# Patient Record
Sex: FEMALE | Race: ASIAN | NOT HISPANIC OR LATINO | Employment: UNEMPLOYED | URBAN - METROPOLITAN AREA
[De-identification: names, ages, dates, MRNs, and addresses within clinical notes are randomized per-mention and may not be internally consistent; named-entity substitution may affect disease eponyms.]

---

## 2017-01-23 ENCOUNTER — OFFICE VISIT - HEALTHEAST (OUTPATIENT)
Dept: FAMILY MEDICINE | Facility: CLINIC | Age: 27
End: 2017-01-23

## 2017-01-23 DIAGNOSIS — D50.9 IRON DEFICIENCY ANEMIA: ICD-10-CM

## 2017-01-23 DIAGNOSIS — F41.9 ANXIETY: ICD-10-CM

## 2017-01-27 ENCOUNTER — COMMUNICATION - HEALTHEAST (OUTPATIENT)
Dept: ONCOLOGY | Facility: HOSPITAL | Age: 27
End: 2017-01-27

## 2017-04-18 ENCOUNTER — OFFICE VISIT - HEALTHEAST (OUTPATIENT)
Dept: ONCOLOGY | Facility: HOSPITAL | Age: 27
End: 2017-04-18

## 2017-04-18 ENCOUNTER — AMBULATORY - HEALTHEAST (OUTPATIENT)
Dept: INFUSION THERAPY | Facility: HOSPITAL | Age: 27
End: 2017-04-18

## 2017-04-18 DIAGNOSIS — D50.0 IRON DEFICIENCY ANEMIA DUE TO CHRONIC BLOOD LOSS: ICD-10-CM

## 2017-04-20 ENCOUNTER — INFUSION - HEALTHEAST (OUTPATIENT)
Dept: INFUSION THERAPY | Facility: HOSPITAL | Age: 27
End: 2017-04-20

## 2017-04-20 DIAGNOSIS — D50.0 IRON DEFICIENCY ANEMIA DUE TO CHRONIC BLOOD LOSS: ICD-10-CM

## 2017-04-21 LAB
BLD PROD TYP BPU: NORMAL
BLD PROD TYP BPU: NORMAL
BLOOD EXPIRATION DATE: NORMAL
BLOOD EXPIRATION DATE: NORMAL
BLOOD TYPE: 5100
BLOOD TYPE: 5100
CODING SYSTEM: NORMAL
CODING SYSTEM: NORMAL
COMPONENT (HISTORICAL CONVERSION): NORMAL
COMPONENT (HISTORICAL CONVERSION): NORMAL
CROSSMATCH: NORMAL
CROSSMATCH: NORMAL
ISSUE DATE AND TIME: NORMAL
ISSUE DATE AND TIME: NORMAL
STATUS (HISTORICAL CONVERSION): NORMAL
STATUS (HISTORICAL CONVERSION): NORMAL
UNIT ABO/RH (HISTORICAL CONVERSION): NORMAL
UNIT ABO/RH (HISTORICAL CONVERSION): NORMAL
UNIT NUMBER: NORMAL
UNIT NUMBER: NORMAL

## 2017-04-24 ENCOUNTER — INFUSION - HEALTHEAST (OUTPATIENT)
Dept: INFUSION THERAPY | Facility: HOSPITAL | Age: 27
End: 2017-04-24

## 2017-04-24 DIAGNOSIS — D50.0 IRON DEFICIENCY ANEMIA DUE TO CHRONIC BLOOD LOSS: ICD-10-CM

## 2017-04-26 ENCOUNTER — INFUSION - HEALTHEAST (OUTPATIENT)
Dept: INFUSION THERAPY | Facility: HOSPITAL | Age: 27
End: 2017-04-26

## 2017-04-26 DIAGNOSIS — D50.0 IRON DEFICIENCY ANEMIA DUE TO CHRONIC BLOOD LOSS: ICD-10-CM

## 2017-04-27 ENCOUNTER — OFFICE VISIT - HEALTHEAST (OUTPATIENT)
Dept: FAMILY MEDICINE | Facility: CLINIC | Age: 27
End: 2017-04-27

## 2017-04-27 DIAGNOSIS — Z30.9 CONTRACEPTIVE MANAGEMENT: ICD-10-CM

## 2017-04-27 DIAGNOSIS — F41.9 ANXIETY: ICD-10-CM

## 2017-04-27 DIAGNOSIS — Z23 IMMUNIZATION DUE: ICD-10-CM

## 2017-04-27 DIAGNOSIS — F41.1 ANXIETY STATE: ICD-10-CM

## 2017-04-27 ASSESSMENT — MIFFLIN-ST. JEOR: SCORE: 1615.4

## 2017-04-28 ENCOUNTER — INFUSION - HEALTHEAST (OUTPATIENT)
Dept: INFUSION THERAPY | Facility: HOSPITAL | Age: 27
End: 2017-04-28

## 2017-04-28 DIAGNOSIS — D50.0 IRON DEFICIENCY ANEMIA DUE TO CHRONIC BLOOD LOSS: ICD-10-CM

## 2017-05-01 ENCOUNTER — INFUSION - HEALTHEAST (OUTPATIENT)
Dept: INFUSION THERAPY | Facility: HOSPITAL | Age: 27
End: 2017-05-01

## 2017-05-01 DIAGNOSIS — D50.0 IRON DEFICIENCY ANEMIA DUE TO CHRONIC BLOOD LOSS: ICD-10-CM

## 2017-05-31 ENCOUNTER — COMMUNICATION - HEALTHEAST (OUTPATIENT)
Dept: ADMINISTRATIVE | Facility: HOSPITAL | Age: 27
End: 2017-05-31

## 2017-06-08 ENCOUNTER — COMMUNICATION - HEALTHEAST (OUTPATIENT)
Dept: ONCOLOGY | Facility: HOSPITAL | Age: 27
End: 2017-06-08

## 2017-06-12 ENCOUNTER — OFFICE VISIT - HEALTHEAST (OUTPATIENT)
Dept: ONCOLOGY | Facility: HOSPITAL | Age: 27
End: 2017-06-12

## 2017-06-12 ENCOUNTER — AMBULATORY - HEALTHEAST (OUTPATIENT)
Dept: INFUSION THERAPY | Facility: HOSPITAL | Age: 27
End: 2017-06-12

## 2017-06-12 DIAGNOSIS — D50.0 IRON DEFICIENCY ANEMIA DUE TO CHRONIC BLOOD LOSS: ICD-10-CM

## 2017-08-01 ENCOUNTER — COMMUNICATION - HEALTHEAST (OUTPATIENT)
Dept: ONCOLOGY | Facility: HOSPITAL | Age: 27
End: 2017-08-01

## 2017-08-01 ENCOUNTER — AMBULATORY - HEALTHEAST (OUTPATIENT)
Dept: ONCOLOGY | Facility: HOSPITAL | Age: 27
End: 2017-08-01

## 2017-08-01 ENCOUNTER — AMBULATORY - HEALTHEAST (OUTPATIENT)
Dept: INFUSION THERAPY | Facility: HOSPITAL | Age: 27
End: 2017-08-01

## 2017-08-01 DIAGNOSIS — D50.9 IRON DEFICIENCY ANEMIA: ICD-10-CM

## 2017-08-02 ENCOUNTER — INFUSION - HEALTHEAST (OUTPATIENT)
Dept: INFUSION THERAPY | Facility: HOSPITAL | Age: 27
End: 2017-08-02

## 2017-08-02 DIAGNOSIS — D50.9 IRON DEFICIENCY ANEMIA: ICD-10-CM

## 2017-08-24 ENCOUNTER — COMMUNICATION - HEALTHEAST (OUTPATIENT)
Dept: FAMILY MEDICINE | Facility: CLINIC | Age: 27
End: 2017-08-24

## 2017-08-24 DIAGNOSIS — F41.9 ANXIETY: ICD-10-CM

## 2017-09-26 ENCOUNTER — COMMUNICATION - HEALTHEAST (OUTPATIENT)
Dept: ADMINISTRATIVE | Facility: HOSPITAL | Age: 27
End: 2017-09-26

## 2017-10-03 ENCOUNTER — AMBULATORY - HEALTHEAST (OUTPATIENT)
Dept: INFUSION THERAPY | Facility: HOSPITAL | Age: 27
End: 2017-10-03

## 2017-10-03 DIAGNOSIS — D50.0 IRON DEFICIENCY ANEMIA DUE TO CHRONIC BLOOD LOSS: ICD-10-CM

## 2017-10-26 ENCOUNTER — OFFICE VISIT - HEALTHEAST (OUTPATIENT)
Dept: ONCOLOGY | Facility: HOSPITAL | Age: 27
End: 2017-10-26

## 2017-10-26 DIAGNOSIS — D50.0 IRON DEFICIENCY ANEMIA DUE TO CHRONIC BLOOD LOSS: ICD-10-CM

## 2017-10-30 ENCOUNTER — INFUSION - HEALTHEAST (OUTPATIENT)
Dept: INFUSION THERAPY | Facility: HOSPITAL | Age: 27
End: 2017-10-30

## 2017-10-30 DIAGNOSIS — D50.0 IRON DEFICIENCY ANEMIA DUE TO CHRONIC BLOOD LOSS: ICD-10-CM

## 2017-11-01 ENCOUNTER — INFUSION - HEALTHEAST (OUTPATIENT)
Dept: INFUSION THERAPY | Facility: HOSPITAL | Age: 27
End: 2017-11-01

## 2017-11-01 ENCOUNTER — AMBULATORY - HEALTHEAST (OUTPATIENT)
Dept: ONCOLOGY | Facility: HOSPITAL | Age: 27
End: 2017-11-01

## 2017-11-01 DIAGNOSIS — D50.0 IRON DEFICIENCY ANEMIA DUE TO CHRONIC BLOOD LOSS: ICD-10-CM

## 2017-11-01 DIAGNOSIS — R11.0 NAUSEA: ICD-10-CM

## 2017-11-03 ENCOUNTER — INFUSION - HEALTHEAST (OUTPATIENT)
Dept: INFUSION THERAPY | Facility: HOSPITAL | Age: 27
End: 2017-11-03

## 2017-11-03 DIAGNOSIS — R11.0 NAUSEA: ICD-10-CM

## 2017-11-03 DIAGNOSIS — D50.0 IRON DEFICIENCY ANEMIA DUE TO CHRONIC BLOOD LOSS: ICD-10-CM

## 2017-11-06 ENCOUNTER — INFUSION - HEALTHEAST (OUTPATIENT)
Dept: INFUSION THERAPY | Facility: HOSPITAL | Age: 27
End: 2017-11-06

## 2017-11-06 DIAGNOSIS — D50.0 IRON DEFICIENCY ANEMIA DUE TO CHRONIC BLOOD LOSS: ICD-10-CM

## 2017-11-06 DIAGNOSIS — R11.0 NAUSEA: ICD-10-CM

## 2017-11-08 ENCOUNTER — INFUSION - HEALTHEAST (OUTPATIENT)
Dept: INFUSION THERAPY | Facility: HOSPITAL | Age: 27
End: 2017-11-08

## 2017-11-08 DIAGNOSIS — R11.0 NAUSEA: ICD-10-CM

## 2017-11-08 DIAGNOSIS — D50.0 IRON DEFICIENCY ANEMIA DUE TO CHRONIC BLOOD LOSS: ICD-10-CM

## 2017-11-10 ENCOUNTER — INFUSION - HEALTHEAST (OUTPATIENT)
Dept: INFUSION THERAPY | Facility: HOSPITAL | Age: 27
End: 2017-11-10

## 2017-11-10 DIAGNOSIS — D50.0 IRON DEFICIENCY ANEMIA DUE TO CHRONIC BLOOD LOSS: ICD-10-CM

## 2017-12-26 ENCOUNTER — COMMUNICATION - HEALTHEAST (OUTPATIENT)
Dept: ADMINISTRATIVE | Facility: HOSPITAL | Age: 27
End: 2017-12-26

## 2018-02-01 ENCOUNTER — OFFICE VISIT - HEALTHEAST (OUTPATIENT)
Dept: FAMILY MEDICINE | Facility: CLINIC | Age: 28
End: 2018-02-01

## 2018-02-01 DIAGNOSIS — D50.0 IRON DEFICIENCY ANEMIA DUE TO CHRONIC BLOOD LOSS: ICD-10-CM

## 2018-02-01 DIAGNOSIS — K64.9 HEMORRHOIDS: ICD-10-CM

## 2018-02-01 DIAGNOSIS — K62.5 RECTAL BLEEDING: ICD-10-CM

## 2018-02-01 LAB
BASOPHILS # BLD AUTO: 0 THOU/UL (ref 0–0.2)
BASOPHILS NFR BLD AUTO: 0 % (ref 0–2)
EOSINOPHIL COUNT (ABSOLUTE): 0.2 THOU/UL (ref 0–0.4)
EOSINOPHIL NFR BLD AUTO: 3 % (ref 0–6)
ERYTHROCYTE [DISTWIDTH] IN BLOOD BY AUTOMATED COUNT: 15 % (ref 11–14.5)
FERRITIN SERPL-MCNC: 11 NG/ML (ref 10–130)
HCT VFR BLD AUTO: 32.9 % (ref 35–47)
HGB BLD-MCNC: 10.8 G/DL (ref 12–16)
LYMPHOCYTES # BLD AUTO: 2.4 THOU/UL (ref 0.8–4.4)
LYMPHOCYTES NFR BLD AUTO: 34 % (ref 20–40)
MCH RBC QN AUTO: 28.2 PG (ref 27–34)
MCHC RBC AUTO-ENTMCNC: 32.8 G/DL (ref 32–36)
MCV RBC AUTO: 86 FL (ref 80–100)
MONOCYTES # BLD AUTO: 0.5 THOU/UL (ref 0–0.9)
MONOCYTES NFR BLD AUTO: 7 % (ref 2–10)
OVALOCYTES: ABNORMAL
PLAT MORPH BLD: NORMAL
PLATELET # BLD AUTO: 402 THOU/UL (ref 140–440)
PMV BLD AUTO: 8.9 FL (ref 8.5–12.5)
RBC # BLD AUTO: 3.83 MILL/UL (ref 3.8–5.4)
REACTIVE LYMPHS: ABNORMAL
TOTAL NEUTROPHILS-ABS(DIFF): 4 THOU/UL (ref 2–7.7)
TOTAL NEUTROPHILS-REL(DIFF): 56 % (ref 50–70)
WBC: 7.1 THOU/UL (ref 4–11)

## 2018-02-01 ASSESSMENT — MIFFLIN-ST. JEOR: SCORE: 1585.91

## 2018-02-20 ENCOUNTER — COMMUNICATION - HEALTHEAST (OUTPATIENT)
Dept: ADMINISTRATIVE | Facility: HOSPITAL | Age: 28
End: 2018-02-20

## 2018-04-17 ENCOUNTER — COMMUNICATION - HEALTHEAST (OUTPATIENT)
Dept: FAMILY MEDICINE | Facility: CLINIC | Age: 28
End: 2018-04-17

## 2018-05-22 ENCOUNTER — COMMUNICATION - HEALTHEAST (OUTPATIENT)
Dept: ONCOLOGY | Facility: HOSPITAL | Age: 28
End: 2018-05-22

## 2018-05-22 DIAGNOSIS — D50.9 IRON DEFICIENCY ANEMIA: ICD-10-CM

## 2018-05-22 DIAGNOSIS — R53.83 FATIGUE: ICD-10-CM

## 2018-05-25 ENCOUNTER — OFFICE VISIT - HEALTHEAST (OUTPATIENT)
Dept: ONCOLOGY | Facility: HOSPITAL | Age: 28
End: 2018-05-25

## 2018-05-25 DIAGNOSIS — D50.0 IRON DEFICIENCY ANEMIA DUE TO CHRONIC BLOOD LOSS: ICD-10-CM

## 2018-05-29 ENCOUNTER — OFFICE VISIT - HEALTHEAST (OUTPATIENT)
Dept: MIDWIFE SERVICES | Facility: CLINIC | Age: 28
End: 2018-05-29

## 2018-05-29 ENCOUNTER — OFFICE VISIT - HEALTHEAST (OUTPATIENT)
Dept: FAMILY MEDICINE | Facility: CLINIC | Age: 28
End: 2018-05-29

## 2018-05-29 DIAGNOSIS — D50.9 IRON DEFICIENCY ANEMIA: ICD-10-CM

## 2018-05-29 DIAGNOSIS — N92.1 MENORRHAGIA WITH IRREGULAR CYCLE: ICD-10-CM

## 2018-05-29 DIAGNOSIS — Z01.812 PRE-PROCEDURE LAB EXAM: ICD-10-CM

## 2018-05-29 DIAGNOSIS — D64.9 ANEMIA: ICD-10-CM

## 2018-05-29 DIAGNOSIS — E66.9 OBESITY (BMI 30-39.9): ICD-10-CM

## 2018-05-29 DIAGNOSIS — E28.2 PCOS (POLYCYSTIC OVARIAN SYNDROME): ICD-10-CM

## 2018-05-29 DIAGNOSIS — Z09 FOLLOW UP: ICD-10-CM

## 2018-05-29 DIAGNOSIS — N92.0 MENORRHAGIA: ICD-10-CM

## 2018-05-29 DIAGNOSIS — Z30.430 ENCOUNTER FOR INSERTION OF INTRAUTERINE CONTRACEPTIVE DEVICE (IUD): ICD-10-CM

## 2018-05-29 DIAGNOSIS — R53.83 FATIGUE: ICD-10-CM

## 2018-05-29 DIAGNOSIS — Z12.4 SCREENING FOR CERVICAL CANCER: ICD-10-CM

## 2018-05-29 LAB
ERYTHROCYTE [DISTWIDTH] IN BLOOD BY AUTOMATED COUNT: 27 % (ref 11–14.5)
FERRITIN SERPL-MCNC: 54 NG/ML (ref 10–130)
HCG UR QL: NEGATIVE
HCT VFR BLD AUTO: 29.2 % (ref 35–47)
HGB BLD-MCNC: 8.4 G/DL (ref 12–16)
IRON SATN MFR SERPL: 6 % (ref 20–50)
IRON SERPL-MCNC: 25 UG/DL (ref 42–175)
MCH RBC QN AUTO: 20.9 PG (ref 27–34)
MCHC RBC AUTO-ENTMCNC: 28.8 G/DL (ref 32–36)
MCV RBC AUTO: 73 FL (ref 80–100)
PLATELET # BLD AUTO: 434 THOU/UL (ref 140–440)
PMV BLD AUTO: 9.4 FL (ref 8.5–12.5)
RBC # BLD AUTO: 4.01 MILL/UL (ref 3.8–5.4)
SP GR UR STRIP: 1.02 (ref 1–1.03)
TIBC SERPL-MCNC: 448 UG/DL (ref 313–563)
TRANSFERRIN SERPL-MCNC: 358 MG/DL (ref 212–360)
VIT B12 SERPL-MCNC: 536 PG/ML (ref 213–816)
WBC: 6.5 THOU/UL (ref 4–11)

## 2018-05-29 ASSESSMENT — MIFFLIN-ST. JEOR: SCORE: 1563.23

## 2018-05-30 LAB
25(OH)D3 SERPL-MCNC: 16.5 NG/ML (ref 30–80)
25(OH)D3 SERPL-MCNC: 16.5 NG/ML (ref 30–80)
BKR LAB AP ABNORMAL BLEEDING: YES
BKR LAB AP BIRTH CONTROL/HORMONES: NORMAL
BKR LAB AP CERVICAL APPEARANCE: NORMAL
BKR LAB AP GYN ADEQUACY: NORMAL
BKR LAB AP GYN INTERPRETATION: NORMAL
BKR LAB AP HPV REFLEX: NORMAL
BKR LAB AP LMP: NORMAL
BKR LAB AP PATIENT STATUS: NORMAL
BKR LAB AP PREVIOUS ABNORMAL: NORMAL
BKR LAB AP PREVIOUS NORMAL: NORMAL
HIGH RISK?: NO
PATH REPORT.COMMENTS IMP SPEC: NORMAL
RESULT FLAG (HE HISTORICAL CONVERSION): NORMAL

## 2018-06-11 ENCOUNTER — INFUSION - HEALTHEAST (OUTPATIENT)
Dept: INFUSION THERAPY | Facility: HOSPITAL | Age: 28
End: 2018-06-11

## 2018-06-11 DIAGNOSIS — D50.0 IRON DEFICIENCY ANEMIA DUE TO CHRONIC BLOOD LOSS: ICD-10-CM

## 2018-06-11 DIAGNOSIS — R11.2 NAUSEA & VOMITING: ICD-10-CM

## 2018-06-13 ENCOUNTER — INFUSION - HEALTHEAST (OUTPATIENT)
Dept: INFUSION THERAPY | Facility: HOSPITAL | Age: 28
End: 2018-06-13

## 2018-06-13 DIAGNOSIS — N92.1 MENORRHAGIA WITH IRREGULAR CYCLE: ICD-10-CM

## 2018-06-13 DIAGNOSIS — R11.2 NAUSEA & VOMITING: ICD-10-CM

## 2018-06-13 DIAGNOSIS — D50.0 IRON DEFICIENCY ANEMIA DUE TO CHRONIC BLOOD LOSS: ICD-10-CM

## 2018-06-15 ENCOUNTER — INFUSION - HEALTHEAST (OUTPATIENT)
Dept: INFUSION THERAPY | Facility: HOSPITAL | Age: 28
End: 2018-06-15

## 2018-06-15 DIAGNOSIS — D50.0 IRON DEFICIENCY ANEMIA DUE TO CHRONIC BLOOD LOSS: ICD-10-CM

## 2018-06-15 DIAGNOSIS — R11.2 NAUSEA & VOMITING: ICD-10-CM

## 2018-06-18 ENCOUNTER — INFUSION - HEALTHEAST (OUTPATIENT)
Dept: INFUSION THERAPY | Facility: HOSPITAL | Age: 28
End: 2018-06-18

## 2018-06-18 DIAGNOSIS — R11.2 NAUSEA & VOMITING: ICD-10-CM

## 2018-06-18 DIAGNOSIS — D50.0 IRON DEFICIENCY ANEMIA DUE TO CHRONIC BLOOD LOSS: ICD-10-CM

## 2018-06-20 ENCOUNTER — INFUSION - HEALTHEAST (OUTPATIENT)
Dept: INFUSION THERAPY | Facility: HOSPITAL | Age: 28
End: 2018-06-20

## 2018-06-20 DIAGNOSIS — R11.2 NAUSEA & VOMITING: ICD-10-CM

## 2018-06-20 DIAGNOSIS — D50.0 IRON DEFICIENCY ANEMIA DUE TO CHRONIC BLOOD LOSS: ICD-10-CM

## 2018-06-20 DIAGNOSIS — E61.1 IRON DEFICIENCY: ICD-10-CM

## 2018-06-21 ENCOUNTER — RECORDS - HEALTHEAST (OUTPATIENT)
Dept: ADMINISTRATIVE | Facility: OTHER | Age: 28
End: 2018-06-21

## 2018-06-26 ENCOUNTER — OFFICE VISIT - HEALTHEAST (OUTPATIENT)
Dept: MIDWIFE SERVICES | Facility: CLINIC | Age: 28
End: 2018-06-26

## 2018-06-26 ENCOUNTER — AMBULATORY - HEALTHEAST (OUTPATIENT)
Dept: MIDWIFE SERVICES | Facility: CLINIC | Age: 28
End: 2018-06-26

## 2018-06-26 DIAGNOSIS — N89.8 VAGINAL ODOR: ICD-10-CM

## 2018-06-26 DIAGNOSIS — N76.0 BV (BACTERIAL VAGINOSIS): ICD-10-CM

## 2018-06-26 DIAGNOSIS — B96.89 BV (BACTERIAL VAGINOSIS): ICD-10-CM

## 2018-06-26 DIAGNOSIS — N92.1 MENORRHAGIA WITH IRREGULAR CYCLE: ICD-10-CM

## 2018-06-26 LAB
CHOLEST SERPL-MCNC: 170 MG/DL
CLUE CELLS: ABNORMAL
FASTING STATUS PATIENT QL REPORTED: YES
FASTING STATUS PATIENT QL REPORTED: YES
GLUCOSE BLD-MCNC: 89 MG/DL (ref 70–125)
HDLC SERPL-MCNC: 47 MG/DL
LDLC SERPL CALC-MCNC: 97 MG/DL
PROLACTIN SERPL-MCNC: 12.5 NG/ML (ref 0–20)
TRICHOMONAS, WET PREP: ABNORMAL
TRIGL SERPL-MCNC: 132 MG/DL
YEAST, WET PREP: ABNORMAL

## 2018-06-26 ASSESSMENT — MIFFLIN-ST. JEOR: SCORE: 1576.84

## 2018-06-27 ENCOUNTER — COMMUNICATION - HEALTHEAST (OUTPATIENT)
Dept: ADMINISTRATIVE | Facility: CLINIC | Age: 28
End: 2018-06-27

## 2018-06-27 ENCOUNTER — COMMUNICATION - HEALTHEAST (OUTPATIENT)
Dept: MIDWIFE SERVICES | Facility: CLINIC | Age: 28
End: 2018-06-27

## 2018-06-27 DIAGNOSIS — B96.89 BV (BACTERIAL VAGINOSIS): ICD-10-CM

## 2018-06-27 DIAGNOSIS — N76.0 BV (BACTERIAL VAGINOSIS): ICD-10-CM

## 2018-06-27 LAB
C TRACH DNA SPEC QL PROBE+SIG AMP: NEGATIVE
N GONORRHOEA DNA SPEC QL NAA+PROBE: NEGATIVE

## 2018-06-29 LAB
SHBG SERPL-SCNC: 11 NMOL/L (ref 30–135)
TESTOST FREE SERPL-MCNC: 1.59 NG/DL (ref 0.08–0.74)
TESTOST SERPL-MCNC: 54 NG/DL (ref 8–60)

## 2018-07-16 ENCOUNTER — RECORDS - HEALTHEAST (OUTPATIENT)
Dept: ADMINISTRATIVE | Facility: OTHER | Age: 28
End: 2018-07-16

## 2018-07-30 ENCOUNTER — AMBULATORY - HEALTHEAST (OUTPATIENT)
Dept: INFUSION THERAPY | Facility: HOSPITAL | Age: 28
End: 2018-07-30

## 2018-07-30 ENCOUNTER — OFFICE VISIT - HEALTHEAST (OUTPATIENT)
Dept: ONCOLOGY | Facility: HOSPITAL | Age: 28
End: 2018-07-30

## 2018-07-30 DIAGNOSIS — D50.0 IRON DEFICIENCY ANEMIA DUE TO CHRONIC BLOOD LOSS: ICD-10-CM

## 2018-07-30 LAB
BASOPHILS # BLD AUTO: 0 THOU/UL (ref 0–0.2)
BASOPHILS NFR BLD AUTO: 0 % (ref 0–2)
EOSINOPHIL # BLD AUTO: 0.2 THOU/UL (ref 0–0.4)
EOSINOPHIL NFR BLD AUTO: 3 % (ref 0–6)
ERYTHROCYTE [DISTWIDTH] IN BLOOD BY AUTOMATED COUNT: 23.2 % (ref 11–14.5)
FERRITIN SERPL-MCNC: 22 NG/ML (ref 10–130)
HCT VFR BLD AUTO: 38.1 % (ref 35–47)
HGB BLD-MCNC: 12.2 G/DL (ref 12–16)
LYMPHOCYTES # BLD AUTO: 1.9 THOU/UL (ref 0.8–4.4)
LYMPHOCYTES NFR BLD AUTO: 34 % (ref 20–40)
MCH RBC QN AUTO: 26 PG (ref 27–34)
MCHC RBC AUTO-ENTMCNC: 32 G/DL (ref 32–36)
MCV RBC AUTO: 81 FL (ref 80–100)
MONOCYTES # BLD AUTO: 0.3 THOU/UL (ref 0–0.9)
MONOCYTES NFR BLD AUTO: 5 % (ref 2–10)
NEUTROPHILS # BLD AUTO: 3.1 THOU/UL (ref 2–7.7)
NEUTROPHILS NFR BLD AUTO: 57 % (ref 50–70)
OVALOCYTES: ABNORMAL
PLAT MORPH BLD: NORMAL
PLATELET # BLD AUTO: 332 THOU/UL (ref 140–440)
PMV BLD AUTO: 8.2 FL (ref 8.5–12.5)
RBC # BLD AUTO: 4.7 MILL/UL (ref 3.8–5.4)
WBC: 5.5 THOU/UL (ref 4–11)

## 2018-08-08 ENCOUNTER — COMMUNICATION - HEALTHEAST (OUTPATIENT)
Dept: MIDWIFE SERVICES | Facility: CLINIC | Age: 28
End: 2018-08-08

## 2018-08-16 ENCOUNTER — RECORDS - HEALTHEAST (OUTPATIENT)
Dept: ADMINISTRATIVE | Facility: OTHER | Age: 28
End: 2018-08-16

## 2018-09-10 ENCOUNTER — RECORDS - HEALTHEAST (OUTPATIENT)
Dept: ADMINISTRATIVE | Facility: OTHER | Age: 28
End: 2018-09-10

## 2018-11-01 ENCOUNTER — OFFICE VISIT - HEALTHEAST (OUTPATIENT)
Dept: FAMILY MEDICINE | Facility: CLINIC | Age: 28
End: 2018-11-01

## 2018-11-01 DIAGNOSIS — E66.9 OBESITY (BMI 30-39.9): ICD-10-CM

## 2018-11-01 DIAGNOSIS — F33.1 MAJOR DEPRESSIVE DISORDER, RECURRENT EPISODE, MODERATE (H): ICD-10-CM

## 2018-11-01 DIAGNOSIS — F41.1 GENERALIZED ANXIETY DISORDER: ICD-10-CM

## 2018-11-01 DIAGNOSIS — F41.9 ANXIETY: ICD-10-CM

## 2018-11-01 DIAGNOSIS — D50.0 IRON DEFICIENCY ANEMIA DUE TO CHRONIC BLOOD LOSS: ICD-10-CM

## 2018-11-01 DIAGNOSIS — E55.9 VITAMIN D DEFICIENCY: ICD-10-CM

## 2018-11-01 DIAGNOSIS — R73.9 HYPERGLYCEMIA: ICD-10-CM

## 2018-11-01 DIAGNOSIS — Z97.5 IUD (INTRAUTERINE DEVICE) IN PLACE: ICD-10-CM

## 2018-11-01 DIAGNOSIS — Z23 NEED FOR VACCINATION: ICD-10-CM

## 2018-11-01 DIAGNOSIS — F90.9 ATTENTION DEFICIT HYPERACTIVITY DISORDER (ADHD), UNSPECIFIED ADHD TYPE: ICD-10-CM

## 2018-11-01 DIAGNOSIS — N92.1 MENORRHAGIA WITH IRREGULAR CYCLE: ICD-10-CM

## 2018-11-01 DIAGNOSIS — Z00.00 ROUTINE GENERAL MEDICAL EXAMINATION AT A HEALTH CARE FACILITY: ICD-10-CM

## 2018-11-01 DIAGNOSIS — F41.8 ANXIETY ASSOCIATED WITH DEPRESSION: ICD-10-CM

## 2018-11-01 RX ORDER — LORAZEPAM 0.5 MG/1
0.5 TABLET ORAL DAILY PRN
Qty: 30 TABLET | Refills: 0 | Status: SHIPPED | OUTPATIENT
Start: 2018-11-01 | End: 2023-05-09

## 2018-11-01 ASSESSMENT — MIFFLIN-ST. JEOR: SCORE: 1611.54

## 2018-11-01 NOTE — ASSESSMENT & PLAN NOTE
Diagnosed at approximately 23 y.o. Per patient. Patient reports neuropsych testing but I am unable to find on chart. Symptoms primarily attention deficit. There may be significant overlap with depression/anxiety symptoms. We are starting Lexapro to treat JAVIER and depression. If persistent ADD symptoms, we will consider formal testing.

## 2018-11-01 NOTE — ASSESSMENT & PLAN NOTE
Significant symptoms at this time. Most suggestive of JAVIER but certainly may be some overlapping major depressive disorder. The patient has done well on Lexapro in the past and will restart that at5 mg daily for 7 days, then 10 mg daily. Follow up in 8 weeks.

## 2018-11-05 ENCOUNTER — AMBULATORY - HEALTHEAST (OUTPATIENT)
Dept: LAB | Facility: CLINIC | Age: 28
End: 2018-11-05

## 2018-11-05 DIAGNOSIS — E55.9 VITAMIN D DEFICIENCY: ICD-10-CM

## 2018-11-05 DIAGNOSIS — Z00.00 ROUTINE GENERAL MEDICAL EXAMINATION AT A HEALTH CARE FACILITY: ICD-10-CM

## 2018-11-05 DIAGNOSIS — D50.0 IRON DEFICIENCY ANEMIA DUE TO CHRONIC BLOOD LOSS: ICD-10-CM

## 2018-11-05 DIAGNOSIS — R73.9 HYPERGLYCEMIA: ICD-10-CM

## 2018-11-05 LAB
ALBUMIN SERPL-MCNC: 4 G/DL (ref 3.5–5)
ALP SERPL-CCNC: 87 U/L (ref 45–120)
ALT SERPL W P-5'-P-CCNC: 28 U/L (ref 0–45)
ANION GAP SERPL CALCULATED.3IONS-SCNC: 10 MMOL/L (ref 5–18)
AST SERPL W P-5'-P-CCNC: 19 U/L (ref 0–40)
BASOPHILS # BLD AUTO: 0 THOU/UL (ref 0–0.2)
BASOPHILS NFR BLD AUTO: 1 % (ref 0–2)
BILIRUB SERPL-MCNC: 0.8 MG/DL (ref 0–1)
BUN SERPL-MCNC: 13 MG/DL (ref 8–22)
CALCIUM SERPL-MCNC: 9.9 MG/DL (ref 8.5–10.5)
CHLORIDE BLD-SCNC: 107 MMOL/L (ref 98–107)
CHOLEST SERPL-MCNC: 183 MG/DL
CO2 SERPL-SCNC: 25 MMOL/L (ref 22–31)
CREAT SERPL-MCNC: 0.76 MG/DL (ref 0.6–1.1)
EOSINOPHIL # BLD AUTO: 0.2 THOU/UL (ref 0–0.4)
EOSINOPHIL NFR BLD AUTO: 4 % (ref 0–6)
ERYTHROCYTE [DISTWIDTH] IN BLOOD BY AUTOMATED COUNT: 15.8 % (ref 11–14.5)
FASTING STATUS PATIENT QL REPORTED: YES
FERRITIN SERPL-MCNC: 16 NG/ML (ref 10–130)
GFR SERPL CREATININE-BSD FRML MDRD: >60 ML/MIN/1.73M2
GLUCOSE BLD-MCNC: 92 MG/DL (ref 70–125)
HBA1C MFR BLD: 5.2 % (ref 3.5–6)
HCT VFR BLD AUTO: 41.2 % (ref 35–47)
HDLC SERPL-MCNC: 50 MG/DL
HGB BLD-MCNC: 13.7 G/DL (ref 12–16)
IRON SATN MFR SERPL: 22 % (ref 20–50)
IRON SERPL-MCNC: 92 UG/DL (ref 42–175)
LDLC SERPL CALC-MCNC: 114 MG/DL
LYMPHOCYTES # BLD AUTO: 1.7 THOU/UL (ref 0.8–4.4)
LYMPHOCYTES NFR BLD AUTO: 30 % (ref 20–40)
MCH RBC QN AUTO: 27.3 PG (ref 27–34)
MCHC RBC AUTO-ENTMCNC: 33.2 G/DL (ref 32–36)
MCV RBC AUTO: 82 FL (ref 80–100)
MONOCYTES # BLD AUTO: 0.4 THOU/UL (ref 0–0.9)
MONOCYTES NFR BLD AUTO: 6 % (ref 2–10)
NEUTROPHILS # BLD AUTO: 3.4 THOU/UL (ref 2–7.7)
NEUTROPHILS NFR BLD AUTO: 60 % (ref 50–70)
PLATELET # BLD AUTO: 305 THOU/UL (ref 140–440)
PMV BLD AUTO: 7 FL (ref 7–10)
POTASSIUM BLD-SCNC: 4.7 MMOL/L (ref 3.5–5)
PROT SERPL-MCNC: 7.4 G/DL (ref 6–8)
RBC # BLD AUTO: 5.02 MILL/UL (ref 3.8–5.4)
SODIUM SERPL-SCNC: 142 MMOL/L (ref 136–145)
TIBC SERPL-MCNC: 410 UG/DL (ref 313–563)
TRANSFERRIN SERPL-MCNC: 328 MG/DL (ref 212–360)
TRIGL SERPL-MCNC: 95 MG/DL
WBC: 5.7 THOU/UL (ref 4–11)

## 2018-11-06 LAB
25(OH)D3 SERPL-MCNC: 39.9 NG/ML (ref 30–80)
25(OH)D3 SERPL-MCNC: 39.9 NG/ML (ref 30–80)

## 2018-12-04 ENCOUNTER — OFFICE VISIT - HEALTHEAST (OUTPATIENT)
Dept: FAMILY MEDICINE | Facility: CLINIC | Age: 28
End: 2018-12-04

## 2018-12-04 DIAGNOSIS — K21.9 GASTROESOPHAGEAL REFLUX DISEASE, ESOPHAGITIS PRESENCE NOT SPECIFIED: ICD-10-CM

## 2018-12-04 DIAGNOSIS — T78.40XS ALLERGIC REACTION, SEQUELA: ICD-10-CM

## 2018-12-04 LAB
ALBUMIN SERPL-MCNC: 4 G/DL (ref 3.5–5)
ALP SERPL-CCNC: 71 U/L (ref 45–120)
ALT SERPL W P-5'-P-CCNC: 32 U/L (ref 0–45)
AMYLASE SERPL-CCNC: 47 U/L (ref 5–120)
AST SERPL W P-5'-P-CCNC: 18 U/L (ref 0–40)
BASOPHILS # BLD AUTO: 0 THOU/UL (ref 0–0.2)
BASOPHILS NFR BLD AUTO: 1 % (ref 0–2)
BILIRUB DIRECT SERPL-MCNC: 0.2 MG/DL
BILIRUB SERPL-MCNC: 0.5 MG/DL (ref 0–1)
EOSINOPHIL # BLD AUTO: 0.1 THOU/UL (ref 0–0.4)
EOSINOPHIL NFR BLD AUTO: 1 % (ref 0–6)
ERYTHROCYTE [DISTWIDTH] IN BLOOD BY AUTOMATED COUNT: 15.4 % (ref 11–14.5)
HCT VFR BLD AUTO: 42.8 % (ref 35–47)
HGB BLD-MCNC: 14.1 G/DL (ref 12–16)
LYMPHOCYTES # BLD AUTO: 3 THOU/UL (ref 0.8–4.4)
LYMPHOCYTES NFR BLD AUTO: 38 % (ref 20–40)
MCH RBC QN AUTO: 28 PG (ref 27–34)
MCHC RBC AUTO-ENTMCNC: 33 G/DL (ref 32–36)
MCV RBC AUTO: 85 FL (ref 80–100)
MONOCYTES # BLD AUTO: 0.4 THOU/UL (ref 0–0.9)
MONOCYTES NFR BLD AUTO: 6 % (ref 2–10)
NEUTROPHILS # BLD AUTO: 4.3 THOU/UL (ref 2–7.7)
NEUTROPHILS NFR BLD AUTO: 55 % (ref 50–70)
PLATELET # BLD AUTO: 317 THOU/UL (ref 140–440)
PMV BLD AUTO: 6.9 FL (ref 7–10)
PROT SERPL-MCNC: 7 G/DL (ref 6–8)
RBC # BLD AUTO: 5.05 MILL/UL (ref 3.8–5.4)
WBC: 7.9 THOU/UL (ref 4–11)

## 2018-12-04 RX ORDER — FAMOTIDINE 20 MG/1
20 TABLET, FILM COATED ORAL 2 TIMES DAILY
Qty: 180 TABLET | Refills: 1 | Status: SHIPPED | OUTPATIENT
Start: 2018-12-04 | End: 2023-05-09

## 2018-12-04 NOTE — ASSESSMENT & PLAN NOTE
Symptoms suspicious for a food-based allergy, possibly papaya or egg plant.  She has an EpiPen to use if needed.  I recommended evaluation by an allergist and referral was placed.  She was instructed to avoid papaya and egg plant until further evaluation by the allergist.

## 2018-12-04 NOTE — ASSESSMENT & PLAN NOTE
Labs including H. pylori, CBC, amylase, and hepatic panel.  Increase Pepcid to 20 mg twice daily.  She will be due for recheck of herother issues in early January and we can recheck this issue at that time.

## 2019-02-05 ENCOUNTER — OFFICE VISIT - HEALTHEAST (OUTPATIENT)
Dept: FAMILY MEDICINE | Facility: CLINIC | Age: 29
End: 2019-02-05

## 2019-02-05 DIAGNOSIS — R05.9 COUGH: ICD-10-CM

## 2019-02-05 DIAGNOSIS — B34.9 VIRAL ILLNESS: ICD-10-CM

## 2019-02-05 DIAGNOSIS — Z97.5 IUD (INTRAUTERINE DEVICE) IN PLACE: ICD-10-CM

## 2019-02-05 DIAGNOSIS — J01.00 ACUTE NON-RECURRENT MAXILLARY SINUSITIS: ICD-10-CM

## 2019-02-05 DIAGNOSIS — N92.0 FREQUENT MENSTRUATION: ICD-10-CM

## 2019-02-05 LAB
ERYTHROCYTE [DISTWIDTH] IN BLOOD BY AUTOMATED COUNT: 12.5 % (ref 11–14.5)
FLUAV AG SPEC QL IA: NORMAL
FLUBV AG SPEC QL IA: NORMAL
HCT VFR BLD AUTO: 46.7 % (ref 35–47)
HGB BLD-MCNC: 15.7 G/DL (ref 12–16)
MCH RBC QN AUTO: 30.2 PG (ref 27–34)
MCHC RBC AUTO-ENTMCNC: 33.6 G/DL (ref 32–36)
MCV RBC AUTO: 90 FL (ref 80–100)
PLATELET # BLD AUTO: 308 THOU/UL (ref 140–440)
PMV BLD AUTO: 6.8 FL (ref 7–10)
RBC # BLD AUTO: 5.2 MILL/UL (ref 3.8–5.4)
WBC: 4.8 THOU/UL (ref 4–11)

## 2019-02-05 ASSESSMENT — MIFFLIN-ST. JEOR: SCORE: 1609.27

## 2019-02-13 ENCOUNTER — HOSPITAL ENCOUNTER (OUTPATIENT)
Dept: ULTRASOUND IMAGING | Facility: HOSPITAL | Age: 29
Discharge: HOME | End: 2019-02-13
Attending: FAMILY MEDICINE

## 2019-02-13 DIAGNOSIS — N92.0 FREQUENT MENSTRUATION: ICD-10-CM

## 2019-02-13 DIAGNOSIS — Z97.5 IUD (INTRAUTERINE DEVICE) IN PLACE: ICD-10-CM

## 2019-06-06 ENCOUNTER — OFFICE VISIT - HEALTHEAST (OUTPATIENT)
Dept: FAMILY MEDICINE | Facility: CLINIC | Age: 29
End: 2019-06-06

## 2019-06-06 DIAGNOSIS — Z97.5 IUD (INTRAUTERINE DEVICE) IN PLACE: ICD-10-CM

## 2019-06-06 DIAGNOSIS — R06.09 DYSPNEA ON EXERTION: ICD-10-CM

## 2019-06-06 DIAGNOSIS — R53.83 FATIGUE, UNSPECIFIED TYPE: ICD-10-CM

## 2019-06-06 DIAGNOSIS — Z23 NEED FOR VACCINATION: ICD-10-CM

## 2019-06-06 DIAGNOSIS — F33.1 MAJOR DEPRESSIVE DISORDER, RECURRENT EPISODE, MODERATE (H): ICD-10-CM

## 2019-06-06 LAB
25(OH)D3 SERPL-MCNC: 23.7 NG/ML (ref 30–80)
25(OH)D3 SERPL-MCNC: 23.7 NG/ML (ref 30–80)
B BURGDOR IGG+IGM SER QL: 0.31 INDEX VALUE
T4 FREE SERPL-MCNC: 0.9 NG/DL (ref 0.7–1.8)
TSH SERPL DL<=0.005 MIU/L-ACNC: 1 UIU/ML (ref 0.3–5)

## 2019-06-06 ASSESSMENT — MIFFLIN-ST. JEOR: SCORE: 1533.29

## 2019-06-06 NOTE — ASSESSMENT & PLAN NOTE
PHQ 9 and JAVIER 7 normal.  Patient reports symptoms well controlled.  Continue Lexapro 10 mg daily.

## 2019-06-06 NOTE — ASSESSMENT & PLAN NOTE
Unfortunately the patient continues to have a small amount of spotting daily.  Hemoglobin is normal.  Ultrasound in February showed normal positioning.  Although this pattern is bothersome, it is not worrisome.  We discussed really the only way to deal with it would be to remove her IUD which she does not want done at this time.

## 2019-06-14 ENCOUNTER — COMMUNICATION - HEALTHEAST (OUTPATIENT)
Dept: FAMILY MEDICINE | Facility: CLINIC | Age: 29
End: 2019-06-14

## 2019-11-29 ENCOUNTER — COMMUNICATION - HEALTHEAST (OUTPATIENT)
Dept: FAMILY MEDICINE | Facility: CLINIC | Age: 29
End: 2019-11-29

## 2019-11-29 DIAGNOSIS — F41.9 ANXIETY: ICD-10-CM

## 2019-11-29 RX ORDER — ESCITALOPRAM OXALATE 10 MG/1
10 TABLET ORAL DAILY
Qty: 90 TABLET | Refills: 0 | Status: SHIPPED | OUTPATIENT
Start: 2019-11-29 | End: 2023-05-09

## 2019-12-18 ENCOUNTER — COMMUNICATION - HEALTHEAST (OUTPATIENT)
Dept: SCHEDULING | Facility: CLINIC | Age: 29
End: 2019-12-18

## 2020-04-27 ENCOUNTER — OFFICE VISIT - HEALTHEAST (OUTPATIENT)
Dept: FAMILY MEDICINE | Facility: CLINIC | Age: 30
End: 2020-04-27

## 2020-04-27 DIAGNOSIS — N30.00 ACUTE CYSTITIS WITHOUT HEMATURIA: ICD-10-CM

## 2020-05-27 ENCOUNTER — AMBULATORY - HEALTHEAST (OUTPATIENT)
Dept: FAMILY MEDICINE | Facility: CLINIC | Age: 30
End: 2020-05-27

## 2020-05-27 ENCOUNTER — VIRTUAL VISIT (OUTPATIENT)
Dept: FAMILY MEDICINE | Facility: OTHER | Age: 30
End: 2020-05-27

## 2020-05-27 DIAGNOSIS — Z20.822 SUSPECTED COVID-19 VIRUS INFECTION: ICD-10-CM

## 2020-05-28 NOTE — PROGRESS NOTES
"Date: 2020 10:12:57  Clinician: Julia Robbins  Clinician NPI: [id number]  Patient: Adrien Mcelroy  Patient : 1990  Patient Address: [address], [place] [address]  Patient Phone: [phone]  Visit Protocol: URI  Patient Summary:  Adrien is a 29 year old ( : 1990 ) female who initiated a Visit for COVID-19 (Coronavirus) evaluation and screening. When asked the question \"Please sign me up to receive news, health information and promotions. \", Adrien responded \"No\".    Adrien states her symptoms started gradually 5-6 days ago.   Her symptoms consist of ageusia, a sore throat, malaise, myalgia, facial pain or pressure, nasal congestion, ear pain, and a headache. She is experiencing mild difficulty breathing with activities but can speak normally in full sentences.   Symptom details     Nasal secretions: The color of her mucus is clear.    Sore throat: Adrien reports having mild throat pain (1-3 on a 10 point pain scale), does not have exudate on her tonsils, and can swallow liquids. She is not sure if the lymph nodes in her neck are enlarged. A rash has not appeared on the skin since the sore throat started.     Facial pain or pressure: The facial pain or pressure does not feel worse when bending or leaning forward.     Headache: She states the headache is mild (1-3 on a 10 point pain scale).      Adrien denies having wheezing, nausea, teeth pain, diarrhea, anosmia, fever, cough, vomiting, rhinitis, and chills. She also denies having recent facial or sinus surgery in the past 60 days and double sickening (worsening symptoms after initial improvement).   Precipitating events  Within the past week, Adrien has not been exposed to someone with strep throat. She has recently been exposed to someone with influenza. Adrien has been in close contact with the following high risk individuals: adults 65 or older.   Pertinent COVID-19 (Coronavirus) information  In the past 14 days, Adrien has worked in a congregate living " setting.   She either works or volunteers as a healthcare worker or a , or works or volunteers in a healthcare facility. 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 She does not live with a healthcare worker.   Adrien has had a close contact with a laboratory-confirmed COVID-19 patient within 14 days of symptom onset. She was exposed at her work. Additional information about contact with COVID-19 (Coronavirus) patient as reported by the patient (free text): Monday, May 18. Exposed to client, was in close contact but was unaware client had COVID until 2 days later.   Pertinent medical history  Adrien has taken an antibiotic medication in the past month. Antibiotic details as reported by the patient (free text): Sulfameth/Trimethoprim 800/160mg Tb used for UTI   Adrien does not get yeast infections when she takes antibiotics.   Adrien needs a return to work/school note.   Weight: 175 lbs   Adrien does not smoke or use smokeless tobacco.   She denies pregnancy and denies breastfeeding. She is currently menstruating.   Weight: 175 lbs    MEDICATIONS: No current medications, ALLERGIES: NKDA  Clinician Response:  Dear Adrien,         Your symptoms show that you may have coronavirus (COVID-19). This illness can cause fever, cough and trouble breathing. Many people get a mild case and get better on their own. Some people can get very sick.  What should I do?  We would like to test you for this virus. This will be a curbside test done outside the clinic.  Please call [phone] to schedule your visit. Explain that you were referred by [facility] to have a COVID-19 test. Be ready to share your [facility] visit ID number.  Starting now:  Stay at least 6 feet away from others. (If someone will drive you to your test, stay in the backseat, as far away  "from the  as you can.)   Don't go to work, school or anywhere else. When it's time for your test, go straight to the testing site. Don't make any stops on the way there or back.   Wash your hands and face often. Use soap and water.   Cover your mouth and nose with a mask, tissue or washcloth.   Don't touch anyone. No hugging, kissing or handshakes.  While at home   Stay home and away from others (self-isolate) until:  You've had no fever---and no medicine that reduces fever---for 3 full days (72 hours). And...  Your other symptoms have gotten better. For example, your cough or breathing has improved. And...  At least 10 days have passed since your symptoms started.  During this time:  Stay in your own room (and use your own bathroom), if you can.  Don't go to work, school or anywhere else.  Stay away from others in your home. No hugging, kissing or shaking hands.  Don't let anyone visit.  Cover your mouth and nose with a mask, tissue or washcloth to avoid spreading germs.  Clean \"high touch\" surfaces often (doorknobs, counters, handles, etc.). Use a household cleaning spray or wipes.  Wash your hands and face often. Use soap and water.  How can I take care of myself?  1. Get lots of rest. Drink extra fluids (unless your doctor has told you not to).  2. Take Tylenol (acetaminophen) for fever or pain. If you have liver or kidney problems, ask your family doctor if it's okay to take Tylenol.  Adults can take either:   650 mg (two 325 mg pills) every 4 to 6 hours, or...  1,000 mg (two 500 mg pills) every 8 hours as needed.   Note: Don't take more than 3,000 mg in one day.   Acetaminophen is found in many medicines (both prescribed and over-the-counter medicines). Read all labels to be sure you don't take too much.   For children, check the Tylenol bottle for the right dose. The dose is based on the child's age or weight.  3. If you have other health problems (like cancer, heart failure, an organ transplant or " severe kidney disease): Call your specialty clinic if you don't feel better in the next 2 days.  4. Know when to call 911: If your breathing is so bad that it keeps you from doing normal activities, call 911 or go to the emergency room. Tell them that you've been staying home and may have COVID-19.  5. Sign up for [employer]. We know it's scary to hear that you might have COVID-19. We want to track your symptoms to make sure you're okay over the next 2 weeks. Please look for an email from [employer]---this is a free, online program that we'll use to keep in touch. To sign up, follow the link in the email. Learn more at [email or web address].  6. The following will serve as your written order for this Covid Test ordered by me for the indication of suspected Covid [Z20.828]: The test will be ordered in [employer], our electronic health record after you are scheduled and will show as ordered and authorized by Pasha Jurado MD   Order: Covid-19 (Coronavirus) PCR for SYMPTOMATIC testing from [facility]  Where can I get more information?  To learn more about COVID-19 and how to care for yourself at home, please visit the CDC website at https://www.cdc.gov/coronavirus/2019-ncov/about/steps-when-sick.html.  For more about your care at [facility], please visit [email or web address]  If you'd like to be part of a COVID-19 clinical trial (research study) at the [facility], go to [email or web address] for details.    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 Please see phone number above to call and they will set you up with the when and where for testing. You will also need to self isolate per instructions above. I hope you feel better soon. take care

## 2020-05-29 ENCOUNTER — COMMUNICATION - HEALTHEAST (OUTPATIENT)
Dept: FAMILY MEDICINE | Facility: CLINIC | Age: 30
End: 2020-05-29

## 2021-02-11 ENCOUNTER — AMBULATORY - HEALTHEAST (OUTPATIENT)
Dept: NURSING | Facility: CLINIC | Age: 31
End: 2021-02-11

## 2021-04-12 ENCOUNTER — COMMUNICATION - HEALTHEAST (OUTPATIENT)
Dept: FAMILY MEDICINE | Facility: CLINIC | Age: 31
End: 2021-04-12

## 2021-05-27 VITALS — SYSTOLIC BLOOD PRESSURE: 130 MMHG | HEART RATE: 96 BPM | OXYGEN SATURATION: 98 % | DIASTOLIC BLOOD PRESSURE: 82 MMHG

## 2021-05-29 NOTE — PATIENT INSTRUCTIONS - HE
We will notify you of lab results.  You will receive a call to schedule for a cardiac echo.  Please continue current medications.

## 2021-05-29 NOTE — PROGRESS NOTES
Assessment/Plan:      Problem List Items Addressed This Visit     IUD (intrauterine device) in place     Unfortunately the patient continues to have a small amount of spotting daily.  Hemoglobin is normal.  Ultrasound in February showed normal positioning.  Although this pattern is bothersome, it is not worrisome.  We discussed really the only way to deal with it would be to remove her IUD which she does not want done at this time.         Major depressive disorder, recurrent episode, moderate (H)     PHQ 9 and JAVIER 7 normal.  Patient reports symptoms well controlled.  Continue Lexapro 10 mg daily.           Other Visit Diagnoses     Dyspnea on exertion    -  Primary    Relevant Orders    Echo Complete    Fatigue, unspecified type        Relevant Orders    Vitamin D, Total (25-Hydroxy)    Thyroid Stimulating Hormone (TSH)    T4, Free    Lyme Antibody Kin[place]    Need for vaccination          Her acute symptoms are fairly nonspecific.  Previous testing in the ER including troponin, d-dimer, CMP, CBC, and chest x-ray were all normal.  She will be drawn for TSH, free T4, vitamin D, and Lyme cascade today.  Given the dyspnea on exertion I am also ordering an echo.  She does report that she missed 2 or 3 days of her Lexapro just prior to the symptoms starting.  The symptoms would be consistent with withdrawal symptoms and that may be the ultimate etiology.  Overall her mood has been well controlled and she will continue with her current dosing.    Patient Instructions   We will notify you of lab results.  You will receive a call to schedule for a cardiac echo.  Please continue current medications.      Return in about 2 weeks (around 6/20/2019) for recheck if not improving..    Subjective:   Adrien Mcelroy is a 28 y.o. female who presents today with illness ongoing for about a week. She reports that she was traveling to [place] on Fri through Mon but symptoms started before that. Her chest feels tight and heavy. She has tingling in  "both of her arms. She is feeling lightheaded and tired as well. Symptoms are constant although it does seem to be worse with exertion. She is having trouble sleeping at night as well. No fever but she has had chills. She has some congestion related to allergies but no cold symptoms. She has nausea but no emesis. No weight changes. No rashes or skin changes. No particular exposures in terms of infection that she is aware of. She is taking her lexapro regularly now although she did miss 2-3 doses prior to her trip.     She is also concerned about continued spotting with her Mirena IUD. The IUD was placed 5/2018. She reports mild spotting everyday since it was placed. No other unusual bleeding or bruising.    Patient Active Problem List   Diagnosis     Major depressive disorder, recurrent episode, moderate (H)     Attention deficit hyperactivity disorder (ADHD), unspecified ADHD type     Vitamin D deficiency     Generalized anxiety disorder     Post concussive syndrome     Hemorrhoids     PCOS (polycystic ovarian syndrome)     Obesity (BMI 30-39.9)     IUD (intrauterine device) in place     Allergic reaction, sequela     Gastroesophageal reflux disease, esophagitis presence not specified      Past Medical History:   Diagnosis Date     Anemia      Attention deficit hyperactivity disorder (ADHD), unspecified ADHD type 3/2/2016     Iron deficiency anemia due to chronic blood loss 4/18/2017     Major depressive disorder, recurrent episode, moderate (H) 3/2/2016     Menorrhagia with irregular cycle 5/22/2018     Past Surgical History:   Procedure Laterality Date     BAND HEMORRHOIDECTOMY  2018 x 4     LUMBAR FUSION  07/2010     WISDOM TOOTH EXTRACTION         Review of System: Relevant items noted in HPI. ROS otherwise negative.       Objective:     Vitals:    06/06/19 0751   BP: 104/82   Pulse: 64   Weight: 187 lb 6.4 oz (85 kg)   Height: 5' 2\" (1.575 m)       Physical Exam   Constitutional: She is oriented to person, " place, and time. She appears well-nourished. No distress.   HENT:   Right Ear: Tympanic membrane and ear canal normal.   Left Ear: Tympanic membrane and ear canal normal.   Mouth/Throat: Oropharynx is clear and moist and mucous membranes are normal.   Eyes: Conjunctivae are normal.   Neck: Normal range of motion. Neck supple. No thyromegaly present.   Cardiovascular: Normal rate and regular rhythm.   No murmur heard.  Pulmonary/Chest: Effort normal and breath sounds normal. No respiratory distress. She has no wheezes. She has no rales.   Abdominal: Normal appearance and bowel sounds are normal. There is no hepatosplenomegaly. There is no tenderness.   Musculoskeletal: She exhibits no edema.   Lymphadenopathy:     She has no cervical adenopathy.   Neurological: She is alert and oriented to person, place, and time. No cranial nerve deficit.   Skin: No rash noted.

## 2021-05-29 NOTE — TELEPHONE ENCOUNTER
[facility] spoke with patient to scheduled the ECHO and she declined to schedule as she told them she is feeling better and does not want to have this done.

## 2021-05-30 VITALS — BODY MASS INDEX: 36.88 KG/M2 | WEIGHT: 208.2 LBS

## 2021-05-30 VITALS — HEIGHT: 63 IN | WEIGHT: 202 LBS | BODY MASS INDEX: 35.79 KG/M2

## 2021-05-30 VITALS — WEIGHT: 203.8 LBS | BODY MASS INDEX: 36.1 KG/M2

## 2021-05-31 VITALS — BODY MASS INDEX: 34.64 KG/M2 | WEIGHT: 195.5 LBS | HEIGHT: 63 IN

## 2021-05-31 VITALS — WEIGHT: 196.4 LBS | BODY MASS INDEX: 34.79 KG/M2

## 2021-05-31 VITALS — WEIGHT: 200.5 LBS | BODY MASS INDEX: 35.52 KG/M2

## 2021-06-01 VITALS — BODY MASS INDEX: 34.33 KG/M2 | WEIGHT: 193.8 LBS

## 2021-06-01 VITALS — WEIGHT: 195 LBS | BODY MASS INDEX: 35.67 KG/M2

## 2021-06-01 VITALS — BODY MASS INDEX: 36.25 KG/M2 | WEIGHT: 197 LBS | HEIGHT: 62 IN

## 2021-06-01 VITALS — HEIGHT: 62 IN | BODY MASS INDEX: 35.7 KG/M2 | WEIGHT: 194 LBS

## 2021-06-01 VITALS — BODY MASS INDEX: 34.95 KG/M2 | WEIGHT: 197.3 LBS

## 2021-06-02 VITALS — HEIGHT: 63 IN | BODY MASS INDEX: 35.86 KG/M2 | WEIGHT: 202.4 LBS

## 2021-06-02 VITALS — BODY MASS INDEX: 35.95 KG/M2 | HEIGHT: 63 IN | WEIGHT: 202.9 LBS

## 2021-06-02 VITALS — BODY MASS INDEX: 35.57 KG/M2 | WEIGHT: 197.6 LBS

## 2021-06-03 VITALS — BODY MASS INDEX: 34.48 KG/M2 | HEIGHT: 62 IN | WEIGHT: 187.4 LBS

## 2021-06-04 NOTE — TELEPHONE ENCOUNTER
Triage call:   Flu like symptoms  Symptoms: abdominal pain, diarrhea, cough, runny nose, cough, body aches and headaches    Symptoms started on Sunday    Body aches are the worst- thermometers were not working   No difficulty breathing  Unsure of influenza exposure   Did not get flu shot   Not considered high risk   No pregnant   Describes chest pain that she describes as tightness in her chest that is present all the time- not just with coughing.     Triaged to be seen in the ER to R/O pneumonia- reviewed additional care advice with patient and she verbalizes understanding. She will go to [facility]    Bhavya Dunn RN BS[facility] Triage/Med Refill 12/18/2019 2:24 PM     Reason for Disposition    Chest pain (EXCEPTION: MILD central chest pain, present only when coughing)    Protocols used: INFLUENZA - SEASONAL-A-OH
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
## 2021-06-10 NOTE — PROGRESS NOTES
Pt arrived ambulatory to clinic for IV Venofer. IV was started without difficulties using aseptic technique with excellent blood return. Administered IV iron per MD order. Pt tolerated infusion well, no s/s of infusion reaction. Pt verbalized understanding and appreciative of information.

## 2021-06-10 NOTE — PROGRESS NOTES
[facility] Hematology and Oncology Progress Note    Patient: Adrien Mcelroy  MRN: [id number]  Date of Service: 04/18/2017        Reason for Visit    Chief Complaint   Patient presents with     Benign Hematology       Assessment and Plan    1.  Iron deficiency anemia: This is from chronic blood loss.  She has very heavy periods.  She states that she actually has her period about every 2-3 weeks and she bleeds for about a week.  She did talk to her primary care doctor about going on birth control but never really pursued it.  I did encourage her to do that.  That may help decrease the bleeding and if she just continues to bleed she will always be iron deficient.  She does not tolerate oral iron.  We will go ahead and give her 1 g of iron sucrose starting Thursday.    2.  Significantly symptomatic iron deficiency anemia: I will give patient 2 units of blood due to her hemoglobin being less than 7.  She is quite symptomatic.  We will recheck her labs in 2 months.  Did have patient sign a consent and explained thoroughly what a blood transfusion is like in the process for that and the risks and benefits involved.    ECOG Performance   ECOG Performance Status: 2     Distress Assessment  Distress Assessment Score: No distress    Pain  Currently in Pain: No/denies  Pain Score (Initial OR Reassessment): No/Denies Pain      Problem List    1. Iron deficiency anemia due to chronic blood loss  HM1(CBC and Differential)    Reticulocytes    Comprehensive Metabolic Panel    Ferritin    Type and Screen    sodium chloride 0.9% 250 mL    sodium chloride 0.9% 250 mL    Prepare RBC: 2 Units    Transfuse RBC: 2 Units    Type and Screen    HM2 (CBC W/O DIFF)    Ferritin      ______________________________________________________________________________    History of Present Illness    DIAGNOSIS: iron deficiency anemia    TREATMENT: oral iron    INTERIM HISTORY: Patient for a follow-up visit.  She states that she has been feeling worse lately  she continues to have palpitations, she feels extremely tired.  She feels short of breath.  He states that she has been trying to take her oral iron but it does cause her to be quite nauseated and she does throw them up a lot.  She continues to have her.  1-2 times a month with about a week of bleeding.  She was supposed to follow-up with us last summer and did not do that.  States that she just been slowly getting worse and worse over the last couple of months with these symptoms.  She has not tried anything to make them improve.    Pain Status  Currently in Pain: No/denies    Review of Systems    Constitutional  Constitutional (WDL): Exceptions to WDL  Fatigue: Fatigue not relieved by rest - Limiting instrumental ADL (Mod/Severe fatigue)  Chills: Mild sensation of cold, shivering, chattering of teeth  Neurosensory  Neurosensory (WDL): Exceptions to WDL  Peripheral Motor Neuropathy: Asymptomatic, clinical or diagnostic observations only, intervention not indicated  Eye   Eye Disorder (WDL): All eye disorder elements are within defined limits  Ear  Ear Disorder (WDL): All ear disorder elements are within defined limits  Cardiovascular  Cardiovascular (WDL): Exceptions to WDL  Palpitations: Definition: A disorder characterized by inflammation of the muscle tissue of the heart. (heart racing)  Pulmonary  Respiratory (WDL): Exceptions to WDL  Dyspnea: Shortness of breath with minimal exertion, limiting instrumental ADL  Gastrointestinal  Gastrointestinal (WDL): Exceptions to WDL (increased hemorrhoids)  Anorexia: Loss of appetite without alteration in eating habits  Nausea: Loss of appetite without alteration in eating habits (Nausea from iron)  Vomitin - 2 episodes ( by 5 minutes) in 24 hrs (Vomiting from iron pills)  Genitourinary  Genitourinary (WDL): All genitourinary elements are within defined limits  Lymphatic  Lymph (WDL): All lymph disorder elements are within defined limits  Musculoskeletal and  Connective Tissue  Musculoskeletal and Connetive Tissue Disorders (WDL): Exceptions to WDL  Muscle Weakness : Symptomatic, perceived by patient but not evident on physical exam  Myalgia: Mild pain  Integumentary  Integumentary (WDL): All integumentary elements are within defined limits  Patient Coping  Patient Coping: Accepting  Distress Assessment  Distress Assessment Score: No distress  Accompanied by  Accompanied by: Alone  Oral Chemo Adherence       Past History  Past Medical History:   Diagnosis Date     Anemia      Attention deficit hyperactivity disorder (ADHD), unspecified ADHD type 3/2/2016     Major depressive disorder, recurrent episode, moderate 3/2/2016       PHYSICAL EXAM:  BP (!) 123/6  Pulse 87  Temp 98.8  F (37.1  C) (Oral)   Wt 203 lb 12.8 oz (92.4 kg)  SpO2 100%  BMI 36.1 kg/m2    GENERAL: no acute distress. Cooperative in conversation. Here alone  HEENT: pupils are equal, round and reactive. Oromucosa is clean and intact. No ulcerations or mucositis noted. No bleeding noted.  RESP: lungs are clear bilaterally per auscultation. Regular respiratory rate. No wheezes or rhonchi.  CV: Regular, rate and rhythm. No murmurs.  ABD: soft, nontender. Positive bowel sounds. No organomegaly.   MUSCULOSKELETAL: No lower extremity swelling.   NEURO: non focal. Alert and oriented x3.   PSYCH: within normal limits. No depression or anxiety.  SKIN: warm dry intact   LYMPH: no cervical, supraclavicular lymphadenopathy    Lab Results    Recent Results (from the past 168 hour(s))   Reticulocytes   Result Value Ref Range    Retic Absolute Count 0.094 0.010 - 0.110 mill/uL   Comprehensive Metabolic Panel   Result Value Ref Range    Sodium 141 136 - 145 mmol/L    Potassium 3.7 3.5 - 5.0 mmol/L    Chloride 107 98 - 107 mmol/L    CO2 25 22 - 31 mmol/L    Anion Gap, Calculation 9 5 - 18 mmol/L    Glucose 114 70 - 125 mg/dL    BUN 15 8 - 22 mg/dL    Creatinine 0.76 0.60 - 1.10 mg/dL    GFR MDRD Af Amer >60 >60  mL/min/1.73m2    GFR MDRD Non Af Amer >60 >60 mL/min/1.73m2    Bilirubin, Total 0.3 0.0 - 1.0 mg/dL    Calcium 10.0 8.5 - 10.5 mg/dL    Protein, Total 7.5 6.0 - 8.0 g/dL    Albumin 3.8 3.5 - 5.0 g/dL    Alkaline Phosphatase 71 45 - 120 U/L    AST 14 0 - 40 U/L    ALT 17 0 - 45 U/L   HM1 (CBC with Diff)   Result Value Ref Range    WBC 9.9 4.0 - 11.0 thou/uL    RBC 3.47 (L) 3.80 - 5.40 mill/uL    Hemoglobin 6.2 (LL) 12.0 - 16.0 g/dL    Hematocrit 22.1 (L) 35.0 - 47.0 %    MCV 64 (L) 80 - 100 fL    MCH 17.9 (L) 27.0 - 34.0 pg    MCHC 28.1 (L) 32.0 - 36.0 g/dL    RDW 20.3 (H) 11.0 - 14.5 %    Platelets 607 (H) 140 - 440 thou/uL    MPV 8.7 8.5 - 12.5 fL    Neutrophils % 68 50 - 70 %    Lymphocytes % 23 20 - 40 %    Monocytes % 7 2 - 10 %    Eosinophils % 1 0 - 6 %    Basophils % 0 0 - 2 %    Neutrophils Absolute 6.7 2.0 - 7.7 thou/uL    Lymphocytes Absolute 2.3 0.8 - 4.4 thou/uL    Monocytes Absolute 0.7 0.0 - 0.9 thou/uL    Eosinophils Absolute 0.1 0.0 - 0.4 thou/uL    Basophils Absolute 0.0 0.0 - 0.2 thou/uL   Manual Differential   Result Value Ref Range    Platelet Estimate Increased (!) Normal    Ovalocytes 2+ (!) Negative    Polychromasia 1+ (!) Negative    Tear Drop Cells 1+ (!) Negative    Target Cells 1+ (!) Negative       Imaging    No results found.      Signed by: Latrice Singleton, CNP

## 2021-06-10 NOTE — PROGRESS NOTES
Assessment/Plan:        Diagnoses and all orders for this visit:    Immunization due  -     HPV vaccine 9 valent 3 dose IM  -     Tdap vaccine,  6yo or older,  IM    Anxiety  JAVIER 7 is 9 and well controlled  Will refill Lexapro 20 mg once per day  Reinforced side effects.   Discussed with patient to follow up if worsening symptoms, questions or concerns  I did recommend follow up for pap smear, physical exam prior to further refills.   Patient agreed and appeared pleased with plan    -     escitalopram oxalate (LEXAPRO) 20 MG tablet; Take 1 tablet (20 mg total) by mouth daily.  Dispense: 30 tablet; Refill: 0    Contraceptive management  Will place referral to  Midwives to place Implanon.   Education provided today regarding all types of birth control, she would like to proceed with Implanon  Discussed with patient to follow up if worsening symptoms, questions or concerns  Patient agreed and appeared pleased with plan    -     Ambulatory referral to Midwifery    Other orders  -     Cancel: escitalopram oxalate (LEXAPRO) 20 MG tablet; Take 0.5 tablets (10 mg total) by mouth daily.  Dispense: 90 tablet; Refill: 1          Subjective:    Patient ID: Adrien Mcelroy is a 26 y.o. female.    HPI Comments: 27 y/o Adrien presets today to discuss birth control, she has never been on birth contorl in the past.   She is currently sexually active and she has been using condoms at this time.   Her last menstrual period was 4/25/2017  She is a nonsmoker, denies any family or personal history of clotting, bleeding or thromboembolic disorders  Notes her mood is stable with lexparo, overall feeling really well!  She is considering Implanon for birth control method.         The following portions of the patient's history were reviewed and updated as appropriate: allergies, current medications, past family history, past medical history, past social history, past surgical history and problem list.    Review of Systems   All other systems  "reviewed and are negative.            Objective:    Physical Exam   Constitutional: She is oriented to person, place, and time. She appears well-developed and well-nourished. No distress.   Cardiovascular: Normal rate and normal heart sounds.    No murmur heard.  Pulmonary/Chest: Effort normal and breath sounds normal.   Neurological: She is alert and oriented to person, place, and time.   Skin: She is not diaphoretic.   Psychiatric: She has a normal mood and affect. Judgment normal.         Vitals:    04/27/17 0956   BP: 116/64   Patient Site: Left Arm   Patient Position: Sitting   Cuff Size: Adult Regular   Pulse: 72   SpO2: 98%   Weight: 202 lb (91.6 kg)   Height: 5' 3\" (1.6 m)         Current Outpatient Prescriptions   Medication Sig Dispense Refill     ACETAMINOPHEN/PAMABROM (MIDOL ORAL) Take by mouth.       escitalopram oxalate (LEXAPRO) 20 MG tablet Take 1 tablet (20 mg total) by mouth daily. 30 tablet 0     LORazepam (ATIVAN) 0.5 MG tablet Take 1 tablet (0.5 mg total) by mouth daily as needed for anxiety (Severe anxiety or panic). 30 tablet 3     PSYLLIUM SEED, WITH SUGAR, (METAMUCIL ORAL) Take 1 capsule by mouth as needed.       ferrous sulfate 325 (65 FE) MG tablet Take 1 tablet by mouth 2 (two) times a day with meals.        No current facility-administered medications for this visit.                  "

## 2021-06-10 NOTE — PROGRESS NOTES
Pt arrived ambulatory to clinic for last dose of IV Venofer. IV was started without difficulties using aseptic technique with excellent blood return. Administered IV iron per MD order. Pt tolerated infusion well, no s/s of infusion reaction. IV site D/C'd using 2x2 and Coban. Pt verbalized understanding of plan of care and follow up with MD.

## 2021-06-10 NOTE — PROGRESS NOTES
Pt arrived ambulatory to clinic for 4th dose of IV Venofer. IV was started without difficulties using aseptic technique with excellent blood return. Administered IV iron per MD order. Pt tolerated infusion well, no s/s of infusion reaction. IV site D/C'd using 2x2 and Coban.  Pt verbalized understanding of plan of care and return to clinic.

## 2021-06-10 NOTE — PROGRESS NOTES
Pt came into infusion clinic for PRBC's and IV Iron as ordered. Labs reviewed. Consent verified. Procedure explained to pt who verbalized understanding. IV patent throughout transfusion. Pt tolerated transfusion and iron infusion with no complications. Pt educated on post blood transfusion. Pt left infusion clinic via ambulatory.

## 2021-06-11 NOTE — PROGRESS NOTES
[facility] Hematology and Oncology Progress Note    Patient: Adrien Mcelroy  MRN: [id number]  Date of Service:         Reason for Visit    Chief Complaint   Patient presents with     Benign Hematology       Assessment and Plan    1.  Iron deficiency anemia: This is from chronic blood loss.  She has very heavy periods.  She states that she actually has her period about every 2-3 weeks and she bleeds for about a week.  She did talk to her primary care doctor about going on birth control but never really pursued it.  I did encourage her to do that again today. I did tell her we will likely continue to have this problem if she doesn't. I also gave her information on foods that are high in iron.  She will return in 3 months or sooner if she has any symptoms.       ECOG Performance   ECOG Performance Status: 0     Distress Assessment  Distress Assessment Score: No distress    Pain  Currently in Pain: No/denies  Pain Score (Initial OR Reassessment): No/Denies Pain      Problem List    1. Iron deficiency anemia due to chronic blood loss  HM1(CBC and Differential)    Ferritin      ______________________________________________________________________________    History of Present Illness    DIAGNOSIS: iron deficiency anemia    TREATMENT: doesn't tolerate oral iron  Received 2 units of blood and 1gram of venofer in April 2017    INTERIM HISTORY: Patient for a follow-up visit.  She states that she is feeling much better since getting the blood and iron. She felt great for quite a while. Has noticed a little more fatigue starting again. Also slight palpitations at times. Continues to have a lot of bleeding with periods.     Pain Status  Currently in Pain: No/denies    Review of Systems    Constitutional  Constitutional (WDL): Exceptions to WDL  Fatigue: Fatigue relieved by rest  Neurosensory  Neurosensory (WDL): All neurosensory elements are within defined limits  Eye   Eye Disorder (WDL): All eye disorder elements are within defined  limits  Ear  Ear Disorder (WDL): All ear disorder elements are within defined limits  Cardiovascular  Cardiovascular (WDL): Exceptions to WDL  Palpitations: Definition: A disorder characterized by inflammation of the muscle tissue of the heart. (Not New, notices more when iron is low)  Pulmonary  Respiratory (WDL): Within Defined Limits  Gastrointestinal  Gastrointestinal (WDL): All gastrointestinal elements are within defined limits  Genitourinary  Genitourinary (WDL): All genitourinary elements are within defined limits  Lymphatic  Lymph (WDL): All lymph disorder elements are within defined limits  Musculoskeletal and Connective Tissue  Musculoskeletal and Connetive Tissue Disorders (WDL): All Musculoskeletal and Connetive Tissue Disorder elements are within defined limits  Integumentary  Integumentary (WDL): All integumentary elements are within defined limits  Patient Coping  Patient Coping: Accepting  Distress Assessment  Distress Assessment Score: No distress  Accompanied by  Accompanied by: Alone  Oral Chemo Adherence       Past History  Past Medical History:   Diagnosis Date     Anemia      Attention deficit hyperactivity disorder (ADHD), unspecified ADHD type 3/2/2016     Major depressive disorder, recurrent episode, moderate 3/2/2016       PHYSICAL EXAM:  /62  Pulse 64  Temp 98  F (36.7  C) (Oral)   Wt 200 lb 8 oz (90.9 kg)  SpO2 98%  BMI 35.52 kg/m2    GENERAL: no acute distress. Cooperative in conversation. Here alone  HEENT: pupils are equal, round and reactive. Oromucosa is clean and intact. No ulcerations or mucositis noted. No bleeding noted.  RESP: lungs are clear bilaterally per auscultation. Regular respiratory rate. No wheezes or rhonchi.  CV: Regular, rate and rhythm. No murmurs.  ABD: soft, nontender. Positive bowel sounds. No organomegaly.   MUSCULOSKELETAL: No lower extremity swelling.   NEURO: non focal. Alert and oriented x3.   PSYCH: within normal limits. No depression or  anxiety.  SKIN: warm dry intact   LYMPH: no cervical, supraclavicular lymphadenopathy    Lab Results    Recent Results (from the past 168 hour(s))   HM2 (CBC W/O DIFF)   Result Value Ref Range    WBC 5.2 4.0 - 11.0 thou/uL    RBC 4.48 3.80 - 5.40 mill/uL    Hemoglobin 11.9 (L) 12.0 - 16.0 g/dL    Hematocrit 36.6 35.0 - 47.0 %    MCV 82 80 - 100 fL    MCH 26.6 (L) 27.0 - 34.0 pg    MCHC 32.5 32.0 - 36.0 g/dL    RDW 21.7 (H) 11.0 - 14.5 %    Platelets 367 140 - 440 thou/uL    MPV 8.8 8.5 - 12.5 fL     Ferritin is still pending.   Imaging    No results found.      Signed by: Latrice Singleton, CNP

## 2021-06-12 NOTE — PROGRESS NOTES
Pt came into infusion clinic for 2 units of PRBC's as ordered. Labs reviewed. Consent verified. Procedure explained to pt who verbalized understanding. IV patent throughout transfusion. Pt tolerated transfusion with no complications. Pt left infusion clinic via ambulatory.

## 2021-06-13 NOTE — PROGRESS NOTES
"Pt arrived ambulatory at 09:59, seated in chair 1 - accompanied by her \"boyfriend.\" Reviewed plan of care and today's treatment. Patient was premedicated with oral zofran as ordered - waited approximately 20 minutes before starting the infusion. Used NS as the primary IV solution, and then infused the 4th dose of venofer 200 mg over 2 hours 5 minutes. The IV was flushed at completion - using a total volume of 250 mls of NS before and after the iron infusion. VSS throughout. Denied nausea, and was able to eat lunch. Napped at intervals. Dc'd the IV at completion and wrapped site securely with gauze and coban. Left unit ambulatory in stable condition at 13:16 and plans to return on Wednesday.    The CNP note on 10/26/17 indicates pt does not tolerate oral iron.    Maria Elena Moscoso RN  "

## 2021-06-13 NOTE — PROGRESS NOTES
Adrien Mcelroy arrived for dose two IV Venofer infusion. Lab results were  reviewed as required. Adrien Mcerloy was educated on her plan of care. She reported she had nausea with first infusion that comtinued for 4 hours after she got home. Plan to infuse Venofer slower today to see if she tolerates it better. Each medication given today was reviewed prior to administration. Venofer treatment was completed as ordered. She continued to experience nausea today with the Venofer infusion as well as a headache. She had no other signs of reaction. IV site:peripheral IV patent throughout treatment with positive blood return obtained before and at completion. IV site with no pain, redness, swelling and drainage. IV site flushed with saline and discontinued. Adrien Mcelroy has follow-up appointment scheduled on 11/03/17 for dose 3 Venofer. Call was placed to triage nurse re the continued nausea with Venofer infusion. Orders were received to administer oral Zofran prior to the next 3 doses of Venofer. Adrien Mcelroy was discharged to home. She discharged ambulatory and with her boyfriend Luis A at 1439. The after visit summary was declined.     Ava Naqvi

## 2021-06-13 NOTE — PROGRESS NOTES
[facility] Hematology and Oncology Progress Note    Patient: Adrien Mcelroy  MRN: [id number]  Date of Service:         Reason for Visit    Chief Complaint   Patient presents with     Benign Hematology       Assessment and Plan    1.  Iron deficiency anemia: This is from chronic blood loss from heavy periods. She will get venofer, 1200mg over 5 doses. She will then get labs 6 weeks after. If her ferritin isn't above 50, we will then give 1400mg.     ECOG Performance   ECOG Performance Status: 0     Distress Assessment  Distress Assessment Score: No distress    Pain  Currently in Pain: No/denies      Problem List    1. Iron deficiency anemia due to chronic blood loss  HM1(CBC and Differential)    Ferritin      ______________________________________________________________________________    History of Present Illness    DIAGNOSIS: iron deficiency anemia    TREATMENT: doesn't tolerate oral iron  Received 2 units of blood and 1gram of venofer in April 2017. We gave 2 more units of blood again in August 2017    INTERIM HISTORY: Patient for a follow-up visit.  She is feeling slightly tired and SOB. No palpitations. Continues to have somewhat heavy periods, but this is better than before. It used to be every 2-3 weeks, but now is every 4 weeks.     Pain Status  Currently in Pain: No/denies    Review of Systems    Constitutional  Constitutional (WDL): Exceptions to WDL  Fatigue: Fatigue relieved by rest  Neurosensory  Neurosensory (WDL): Exceptions to WDL  Eye   Eye Disorder (WDL): All eye disorder elements are within defined limits  Ear  Ear Disorder (WDL): All ear disorder elements are within defined limits  Cardiovascular  Cardiovascular (WDL): All cardiovascular elements are within defined limits  Pulmonary  Respiratory (WDL): Within Defined Limits  Gastrointestinal  Gastrointestinal (WDL): All gastrointestinal elements are within defined limits  Genitourinary  Genitourinary (WDL): All genitourinary elements are within defined  limits  Lymphatic  Lymph (WDL): Assessment not pertinent to visit  Musculoskeletal and Connective Tissue  Musculoskeletal and Connetive Tissue Disorders (WDL): Exceptions to WDL  Muscle Weakness : Symptomatic, perceived by patient but not evident on physical exam (all over per pt)  Integumentary  Integumentary (WDL): All integumentary elements are within defined limits  Patient Coping  Patient Coping: Accepting  Distress Assessment  Distress Assessment Score: No distress  Accompanied by  Accompanied by: Alone  Oral Chemo Adherence       Past History  Past Medical History:   Diagnosis Date     Anemia      Attention deficit hyperactivity disorder (ADHD), unspecified ADHD type 3/2/2016     Major depressive disorder, recurrent episode, moderate 3/2/2016       PHYSICAL EXAM:  /55  Pulse 80  Temp 98.2  F (36.8  C) (Oral)   Wt 196 lb 6.4 oz (89.1 kg)  SpO2 99%  BMI 34.79 kg/m2    GENERAL: no acute distress. Cooperative in conversation. Here alone  HEENT: pupils are equal, round and reactive. Oromucosa is clean and intact. No ulcerations or mucositis noted. No bleeding noted.  RESP: lungs are clear bilaterally per auscultation. Regular respiratory rate. No wheezes or rhonchi.  CV: Regular, rate and rhythm. No murmurs.  ABD: soft, nontender. Positive bowel sounds. No organomegaly.   MUSCULOSKELETAL: No lower extremity swelling.   NEURO: non focal. Alert and oriented x3.   PSYCH: within normal limits. No depression or anxiety.  SKIN: warm dry intact   LYMPH: no cervical, supraclavicular lymphadenopathy    Lab Results    Lab Standing Order on 10/03/2017   Component Date Value Ref Range Status     Ferritin 10/03/2017 4* 10 - 130 ng/mL Final     WBC 10/03/2017 4.6  4.0 - 11.0 thou/uL Final     RBC 10/03/2017 3.49* 3.80 - 5.40 mill/uL Final     Hemoglobin 10/03/2017 7.7* 12.0 - 16.0 g/dL Final     Hematocrit 10/03/2017 26.3* 35.0 - 47.0 % Final     MCV 10/03/2017 75* 80 - 100 fL Final     MCH 10/03/2017 22.1* 27.0 -  34.0 pg Final     MCHC 10/03/2017 29.3* 32.0 - 36.0 g/dL Final     RDW 10/03/2017 21.7* 11.0 - 14.5 % Final     Platelets 10/03/2017 403  140 - 440 thou/uL Final     MPV 10/03/2017 8.4* 8.5 - 12.5 fL Final     Neutrophils % 10/03/2017 55  50 - 70 % Final     Lymphocytes % 10/03/2017 35  20 - 40 % Final     Monocytes % 10/03/2017 7  2 - 10 % Final     Eosinophils % 10/03/2017 3  0 - 6 % Final     Basophils % 10/03/2017 0  0 - 2 % Final     Neutrophils Absolute 10/03/2017 2.5  2.0 - 7.7 thou/uL Final     Lymphocytes Absolute 10/03/2017 1.6  0.8 - 4.4 thou/uL Final     Monocytes Absolute 10/03/2017 0.3  0.0 - 0.9 thou/uL Final     Eosinophils Absolute 10/03/2017 0.1  0.0 - 0.4 thou/uL Final     Basophils Absolute 10/03/2017 0.0  0.0 - 0.2 thou/uL Final   ]    Imaging    No results found.      Signed by: Latrice Singleton, CNP

## 2021-06-13 NOTE — PROGRESS NOTES
Adrien arrived A&Ox4 ambulatory and stable, seated in chair #4. Pt states she is here for venofer to treat WILFRIDO. Adrien states she has had venofer in the past and reports understanding of medication/POC.  PIV w ease L AC, excellent blood return and line easily flushed NS.  Venofer infused as ordered w/  rate 110mL/hr  1112 pt stated she was feeling more nauseated than before her infusion started. Infusion rate was slowed to 75mL/hr and pt was given maribel essential oil and she stated she was feeling improved  Infusion completed w/o incident. Line flushed with NS until clear.PIV dc'd and site covered w cottonball/coban.  VSS  1215 Adrien dc'd A&Ox4 ambulatory and stable, will RTC Wednesday for 2nd dose.

## 2021-06-13 NOTE — PROGRESS NOTES
"Adrien arrived A&Ox4 ambulatory and stable, seated in chair #1. Pt states she is here for venofer to treat WILFRIDO. Adrien states she has had venofer in the past and reports understanding of medication/POC. Pt states she has been feeling nauseated during and after infusion for several hours. Per KE, pt will be pre medicated with   zofran 4mg p.o.; also infusion rate will be 55mL/hr as done 11/1.    PIV w ease L AC, excellent blood return and line easily flushed NS.  zofran 4mg p.o. Given. Pt was also offered essential oils to help ease nausea; she chose mandarin oil.  Venofer infused as ordered'  rate 55mL/hr  Pt c/o abd pain about 40mL into infusion. Later c/o \"bad headache\" towards end of infusion. Pt states headachesand abd pain are intermittent for her. She stated no nausea today.  Infusion completed w/o incident. Line flushed with NS until clear. Then remaining NS infused for hydration, pt stated she does not drink much water. PIV dc'd and site covered w cottonball/coban.  VSS  1350 Adrien dc'd A&Ox4 ambulatory and stable, will RTC Monday for next dose                "

## 2021-06-14 NOTE — PROGRESS NOTES
Pt arrived amb for her last venofer infusion, Pt is feeling better, Iv started in lt hand, Venofer infused flushed with ns then dcd after, vss RTc dec for labs, Pt left amb with her boyfriend at 0955  Dione John

## 2021-06-14 NOTE — PROGRESS NOTES
Pt arrived ambulatory at 07:58, seated in chair 1. Reviewed plan of care and today's treatment. Placed IV, and used NS as the primary IV solution - administered  mls prior to the venofer infusion and premedicated pt with oral zofran. Infused the 5th dose of venofer 200 mg over 2 hours 1 minute, and then flushed the IV with the remaining NS (using a total volume of 250 mls). At completion dc'd IV and wrapped site securely. VSS. Napped at intervals. Up to the bathroom independently. Declined AVS. Left unit ambulatory in stable condition at 11:13 and plans to return on Friday November 10th.     Maria Elena Moscoso RN

## 2021-06-15 NOTE — PROGRESS NOTES
"Assessment/Plan:        Diagnoses and all orders for this visit:    Hemorrhoids  Rectal bleeding  I did review last colonoscopy completed 6/17/2016  Per GI specialist it was recommended \"if ongoing rectal bleeding or ongoing anemia, patient should call our clinic and disucss any further workup or treatment with Dr. Olivarez\"  I did recommend she follow up with Dr. Olivarez.   I did review home supportive care and treatment for hemorrhoids, without any thrombosis on eternal hemorrhoid today  Discussed red flags that would warrant urgent evaluation. Patient verbalized understanding.  Patient agreed and appeared pleased with plan  -     Ambulatory referral to Gastroenterology    Iron deficiency anemia due to chronic blood loss  History of anemia,   Followed by  Hematology. She will obtain future labs that they did order.   Monitor blood count and iron.   -     HM1(CBC and Differential)  -     Ferritin  -     HM1 (CBC with Diff)            Subjective:    Patient ID: Adrien Mcelroy is a 27 y.o. female.    HPI Comments: 26 y/o Adrien presents today for follow up hemorrhoids. Endorses history of hemorrhoids.   They were diagnosed by colonoscopy in 2016. She presents today for recurrent symptoms.   She noes blood with stool - This started three months ago and has been persistent.   Only with bowel movements.   Denies any rectal or abdominal pain.   She has been using metamucil and external steroid cream without relief.   Denies history of consitpation.   Was followed by [facility] and she has not contacted them for persistent symptoms.   She is followed by  hematology for low blood count and iron transfusions, not taking iron supplementations,  she is due for labs.   Denies any dizziness, syncopal symptoms  Currently has her menses as well.       The following portions of the patient's history were reviewed and updated as appropriate: allergies, current medications, past family history, past medical history, past social history, past " "surgical history and problem list.    Review of Systems   Constitutional: Negative.  Negative for chills and fever.   Respiratory: Negative.    Cardiovascular: Negative.    Gastrointestinal: Positive for anal bleeding and blood in stool. Negative for abdominal distention, abdominal pain, constipation, diarrhea, nausea, rectal pain and vomiting.   Neurological: Negative.    Psychiatric/Behavioral: Negative.              Objective:    Physical Exam   Constitutional: She is oriented to person, place, and time. She appears well-developed and well-nourished. No distress.   Cardiovascular: Normal rate and normal heart sounds.    Pulmonary/Chest: Effort normal and breath sounds normal.   Abdominal: Soft. Bowel sounds are normal. She exhibits no distension and no mass. There is no tenderness. There is no rebound and no guarding.   Genitourinary:   Genitourinary Comments: External hemorrhoid, present but without any thrombosis   Neurological: She is alert and oriented to person, place, and time.   Skin: She is not diaphoretic.   Psychiatric: She has a normal mood and affect. Her behavior is normal. Judgment normal.           Vitals:    02/01/18 1338   BP: 120/62   Patient Site: Left Arm   Patient Position: Sitting   Cuff Size: Adult Regular   Pulse: 75   SpO2: 99%   Weight: 195 lb 8 oz (88.7 kg)   Height: 5' 3\" (1.6 m)       Current Outpatient Prescriptions   Medication Sig Dispense Refill     escitalopram oxalate (LEXAPRO) 20 MG tablet TAKE 1 TABLET(20 MG) BY MOUTH DAILY 30 tablet 0     LORazepam (ATIVAN) 0.5 MG tablet Take 1 tablet (0.5 mg total) by mouth daily as needed for anxiety (Severe anxiety or panic). 30 tablet 3     multivitamin capsule Take 1 capsule by mouth daily.       PSYLLIUM SEED, WITH SUGAR, (METAMUCIL ORAL) Take 1 capsule by mouth as needed.       ACETAMINOPHEN/PAMABROM (MIDOL ORAL) Take by mouth.       No current facility-administered medications for this visit.          "

## 2021-06-15 NOTE — PROGRESS NOTES
Pt received initial dose of Pfizer vaccination around 5:15pm. Pt alerted staff to feeling butterflies in her stomach, feeling lightheaded and verbalized feeling her throat tightening, 'like when I have allergic reaction to tropical fruit'. Pt was transported to private area for further assessment via wheelchair and was assisted to lie down. At 5:25pm, Epi-pen injected in right thigh and oral Benadryl 50mg was given. 911 was called and pt was transported to  ED.

## 2021-06-16 PROBLEM — T78.40XS ALLERGIC REACTION, SEQUELA: Status: ACTIVE | Noted: 2018-12-04

## 2021-06-16 PROBLEM — E66.9 OBESITY (BMI 30-39.9): Status: ACTIVE | Noted: 2018-05-29

## 2021-06-18 NOTE — PROGRESS NOTES
[facility]ology [facility] Progress Note    Patient: Adrien Mcelroy  MRN: [id number]  Date of Service:         Reason for Visit    Chief Complaint   Patient presents with     HE Cancer       Assessment and Plan    1.  Iron deficiency anemia: This is from chronic blood loss from heavy periods. She will get venofer, 1000 milligrams over 5 doses.  She will then get labs drawn about 4-6 weeks later.  Hopefully if her menstrual bleeding improves as well as the hemorrhoidal bleeding then she will not need further IV iron.  We will have to monitor closely.    2.  Heavy menstrual bleeding: She has been seen by an OB/GYN and she is going to start birth control pills next week.    3.  Rectal bleeding from hemorrhoids: She does have an appointment to see colorectal surgeon in 2 weeks.    ECOG Performance   ECOG Performance Status: 0     Distress Assessment  Distress Assessment Score: 7    Pain  Currently in Pain: No/denies      Problem List    1. Iron deficiency anemia due to chronic blood loss  HM1(CBC and Differential)    Ferritin      ______________________________________________________________________________    History of Present Illness    DIAGNOSIS: iron deficiency anemia    TREATMENT: doesn't tolerate oral iron  Received 1200 mg of IV iron in October - November 2017.  Received 2 units of blood and 1gram of venofer in April 2017. We gave 2 more units of blood again in August 2017    INTERIM HISTORY: Patient for a follow-up visit to be discharged from the hospital yesterday.  She is feeling slightly tired and SOB better than when she went into the hospital.  She did have 1 unit of blood.  He also had to infusions of iron that were both 100 mg. No palpitations.  He continues to have rectal bleeding as well as heavy menstrual bleeding.    Pain Status  Currently in Pain: No/denies    Review of Systems    Constitutional  Constitutional (WDL): Exceptions to WDL  Fatigue: Fatigue not relieved by rest - Limiting instrumental  ADL  Neurosensory  Neurosensory (WDL): All neurosensory elements are within defined limits  Eye   Eye Disorder (WDL): All eye disorder elements are within defined limits  Ear  Ear Disorder (WDL): Exceptions to WDL  Tinnitus: Mild symptoms, intervention not indicated (chronic)  Cardiovascular  Cardiovascular (WDL): Exceptions to WDL (tachy)  Pulmonary  Respiratory (WDL): Exceptions to WDL  Dyspnea: Shortness of breath with moderate exertion  Gastrointestinal  Gastrointestinal (WDL): Exceptions to WDL  Anorexia: Loss of appetite without alteration in eating habits  Nausea: Loss of appetite without alteration in eating habits  Genitourinary  Genitourinary (WDL): All genitourinary elements are within defined limits  Lymphatic  Lymph (WDL): All lymph disorder elements are within defined limits  Musculoskeletal and Connective Tissue  Musculoskeletal and Connetive Tissue Disorders (WDL): Exceptions to WDL  Myalgia: Mild pain  Integumentary  Integumentary (WDL): All integumentary elements are within defined limits  Patient Coping  Patient Coping: Accepting  Distress Assessment  Distress Assessment Score: 7  Accompanied by  Accompanied by: Alone  Oral Chemo Adherence       Past History  Past Medical History:   Diagnosis Date     Anemia      Attention deficit hyperactivity disorder (ADHD), unspecified ADHD type 3/2/2016     Major depressive disorder, recurrent episode, moderate 3/2/2016       PHYSICAL EXAM:  /71  Pulse 100  Temp 97.9  F (36.6  C) (Oral)   Wt 197 lb 4.8 oz (89.5 kg)  LMP 05/19/2018  SpO2 97%  BMI 34.95 kg/m2    GENERAL: no acute distress. Cooperative in conversation. Here alone  HEENT: pupils are equal, round and reactive. Oromucosa is clean and intact. No ulcerations or mucositis noted. No bleeding noted.  Slightly pale  RESP: lungs are clear bilaterally per auscultation. Regular respiratory rate. No wheezes or rhonchi.  CV: Regular, rate and rhythm. No murmurs.  ABD: soft, nontender. Positive  bowel sounds. No organomegaly.   MUSCULOSKELETAL: No lower extremity swelling.   NEURO: non focal. Alert and oriented x3.   PSYCH: within normal limits. No depression or anxiety.  SKIN: warm dry intact   LYMPH: no cervical, supraclavicular lymphadenopathy    Lab Results    Admission on 05/22/2018, Discharged on 05/24/2018   Component Date Value Ref Range Status     Sodium 05/22/2018 139  136 - 145 mmol/L Final     Potassium 05/22/2018 3.8  3.5 - 5.0 mmol/L Final     Chloride 05/22/2018 107  98 - 107 mmol/L Final     CO2 05/22/2018 24  22 - 31 mmol/L Final     Anion Gap, Calculation 05/22/2018 8  5 - 18 mmol/L Final     Glucose 05/22/2018 112  70 - 125 mg/dL Final     Calcium 05/22/2018 9.3  8.5 - 10.5 mg/dL Final     BUN 05/22/2018 10  8 - 22 mg/dL Final     Creatinine 05/22/2018 0.66  0.60 - 1.10 mg/dL Final     GFR MDRD Af Amer 05/22/2018 >60  >60 mL/min/1.73m2 Final     GFR MDRD Non Af Amer 05/22/2018 >60  >60 mL/min/1.73m2 Final     TSH 05/22/2018 1.30  0.30 - 5.00 uIU/mL Final     VENTRICULAR RATE 05/22/2018 67  BPM Final     ATRIAL RATE 05/22/2018 67  BPM Final     P-R INTERVAL 05/22/2018 158  ms Final     QRS DURATION 05/22/2018 88  ms Final     Q-T INTERVAL 05/22/2018 404  ms Final     QTC CALCULATION (BEZET) 05/22/2018 426  ms Final     P Axis 05/22/2018 21  degrees Final     R AXIS 05/22/2018 26  degrees Final     T AXIS 05/22/2018 4  degrees Final     MUSE DIAGNOSIS 05/22/2018    Final                    Value:Normal sinus rhythm  Nonspecific T wave abnormality  Abnormal ECG  When compared with ECG of 23-MAY-2016 11:59,  No significant change was found  Confirmed by DARIUS CHASE MD LOC:[facility] ([id number]) on 5/22/2018 5:16:44 PM       Magnesium 05/22/2018 2.1  1.8 - 2.6 mg/dL Final     ABORh 05/22/2018 O POS   Final     Antibody Screen 05/22/2018 Negative  Negative Final     Color, UA 05/22/2018 Yellow  Colorless, Yellow, Straw, Light Yellow Final     Clarity, UA 05/22/2018 Clear  Clear Final     Glucose, UA  05/22/2018 Negative  Negative Final     Bilirubin, UA 05/22/2018 Negative  Negative Final     Ketones, UA 05/22/2018 Negative  Negative, 60 mg/dL Final     Specific Gravity, UA 05/22/2018 1.007  1.001 - 1.030 Final     Blood, UA 05/22/2018 Negative  Negative Final     pH, UA 05/22/2018 7.5  4.5 - 8.0 Final     Protein, UA 05/22/2018 Negative  Negative mg/dL Final     Urobilinogen, UA 05/22/2018 <2.0 E.U./dL  <2.0 E.U./dL, 2.0 E.U./dL Final     Nitrite, UA 05/22/2018 Negative  Negative Final     Leukocytes, UA 05/22/2018 Moderate* Negative Final     Bacteria, UA 05/22/2018 None Seen  None Seen hpf Final     RBC, UA 05/22/2018 0-2  None Seen, 0-2 hpf Final     WBC, UA 05/22/2018 5-10* None Seen, 0-5 hpf Final     Squam Epithel, UA 05/22/2018 >100* None Seen, 0-5 lpf Final     Mucus, UA 05/22/2018 Many* None Seen lpf Final     WBC 05/22/2018 7.0  4.0 - 11.0 thou/uL Final     RBC 05/22/2018 3.63* 3.80 - 5.40 mill/uL Final     Hemoglobin 05/22/2018 7.0* 12.0 - 16.0 g/dL Final     Hematocrit 05/22/2018 24.1* 35.0 - 47.0 % Final     MCV 05/22/2018 66* 80 - 100 fL Final     MCH 05/22/2018 19.3* 27.0 - 34.0 pg Final     MCHC 05/22/2018 29.0* 32.0 - 36.0 g/dL Final     RDW 05/22/2018 19.3* 11.0 - 14.5 % Final     Platelets 05/22/2018 467* 140 - 440 thou/uL Final     MPV 05/22/2018 8.6  8.5 - 12.5 fL Final     Neutrophils % 05/22/2018 69  50 - 70 % Final     Lymphocytes % 05/22/2018 25  20 - 40 % Final     Monocytes % 05/22/2018 4  2 - 10 % Final     Eosinophils % 05/22/2018 1  0 - 6 % Final     Basophils % 05/22/2018 0  0 - 2 % Final     Neutrophils Absolute 05/22/2018 4.8  2.0 - 7.7 thou/uL Final     Lymphocytes Absolute 05/22/2018 1.8  0.8 - 4.4 thou/uL Final     Monocytes Absolute 05/22/2018 0.3  0.0 - 0.9 thou/uL Final     Eosinophils Absolute 05/22/2018 0.1  0.0 - 0.4 thou/uL Final     Basophils Absolute 05/22/2018 0.0  0.0 - 0.2 thou/uL Final     Beta hCG Qualitative 05/22/2018 Negative  Negative Final     Culture  05/22/2018 No Growth   Final     Crossmatch 05/24/2018 Compatible   Final     Blood Expiration Date 05/24/2018 [id number]   Final     Unit Type 05/24/2018 O Neg   Final     Unit Number 05/24/2018 [id number]   Final     Status 05/24/2018 Transfused   Final     Component 05/24/2018 Red Blood Cells   Final     PRODUCT CODE 05/24/2018 [id number]   Final     Issue Date and Time 05/24/2018 [id number]   Final     Blood Type 05/24/2018 9500   Final     CODING SYSTEM 05/24/2018 [id number]   Final     WBC 05/23/2018 5.8  4.0 - 11.0 thou/uL Final     RBC 05/23/2018 3.87  3.80 - 5.40 mill/uL Final     Hemoglobin 05/23/2018 8.0* 12.0 - 16.0 g/dL Final     Hematocrit 05/23/2018 26.7* 35.0 - 47.0 % Final     MCV 05/23/2018 69* 80 - 100 fL Final     MCH 05/23/2018 20.7* 27.0 - 34.0 pg Final     MCHC 05/23/2018 30.0* 32.0 - 36.0 g/dL Final     RDW 05/23/2018 21.3* 11.0 - 14.5 % Final     Platelets 05/23/2018 410  140 - 440 thou/uL Final     MPV 05/23/2018 8.4* 8.5 - 12.5 fL Final     Iron 05/23/2018 40* 42 - 175 ug/dL Final     Transferrin 05/23/2018 326  212 - 360 mg/dL Final     Transferrin Saturation, Calculated 05/23/2018 10* 20 - 50 % Final     Transferrin IBC, Calculated 05/23/2018 408  313 - 563 ug/dL Final     Hemoglobin 05/24/2018 8.4* 12.0 - 16.0 g/dL Final   ]    Imaging    Us Pelvis Non Ob Limited    Result Date: 5/22/2018  US PELVIS NON OB LIMITED 5/22/2018 7:40 PM INDICATION: Menorrhagia. TECHNIQUE: Transabdominal scans were performed. Endovaginal ultrasound was performed to better visualize the adnexa. COMPARISON: None. FINDINGS: Uterus measures 7.2 x 3 x 4.7 cm. Normal uterus with no masses. Endometrial thickness is 9 mm. Normal smooth endometrium.  Right ovary measures 4.8 x 2.7 x 3.9 cm. Normal right ovary. Multiple small physiologic follicles. Normal arterial duplex. Left ovary measures 4.5 x 2.6 x 3.1 cm. Normal left ovary. Multiple small physiologic follicles. Normal arterial duplex. Minimal free pelvic  fluid, within physiologic limits.     CONCLUSION: 1.  Prominent sized bilateral ovaries with multiple follicles. Polycystic ovarian syndrome is a possible differential. 2.  Otherwise, unremarkable exam.         Signed by: Latrice Singleton CNP

## 2021-06-18 NOTE — PROGRESS NOTES
"Subjective:      Adrien Mcelroy is a 27 y.o. female. Here for contraceptive counseling and management. Feeling well today. \"I was feeling very fatigued a few days ago, but got IV iron and feel much better today\". Has been seen recently by Internal Medicine and Hematology for chronic anemia due to menstrual and rectal bleeding. Reports daily rectal bleeding. Originally thought she wanted Nexplanon which she was counseled on for over 1 year ago. Reports every other week periods, lasting 7 days, light for a couple of days, then heavy, and then light again at the end. This has been her cycles history x past 3 years. Denies dysmenorrhea. States she is able to tell when her bleeding is rectal vs vaginal.    Denies unprotected intercourse x past 2 weeks- was told to abstain when making appointment for purposes of contraceptive placement. Otherwise has been sexually active with 1 male partner and has never gotten pregnant. Does not desire pregnancy at this time.    Patient states she has never had a pap smear. Had a recent pelvic US.    History obtained from chart review and the patient.    Menarche age: 12 yo  Patient's last menstrual period was 05/19/2018.    Review of Systems  General ROS: negative except for that listed below.  Endocrine ROS: positive for - acne and hair growth on jaw line.  Gastrointestinal ROS: positive for - blood in stools  Genito-Urinary ROS: no dysuria, trouble voiding, or hematuria, positive for - irregular/heavy menses  Musculoskeletal ROS: negative     Objective:     BP 98/64 (Patient Site: Left Arm, Patient Position: Sitting, Cuff Size: Adult Large)  Pulse 62  Resp 14  Ht 5' 2\" (1.575 m)  Wt 194 lb (88 kg)  LMP 05/19/2018  Breastfeeding? No  BMI 35.48 kg/m2     Exam:  General: Calm, in NAD, appears stated age.  Psych: A&O x 4  Skin: Clear with no lesions.  HEENT: unremarkable, otoscopic/ophthalmoscopic exam not performed.  Heart: RRR, no murmur, gallop, rub.  Lungs: CTAB, no adventitious " breath sounds. Breathing unlabored.  Abdomen: soft, non-tender  Pelvic: normal external genitalia. VULVA: normal appearing vulva with no masses, tenderness or lesions, VAGINA: normal appearing vagina with normal color and discharge, no lesions, CERVIX: normal appearing cervix without discharge or lesions, no polyps visualized. UTERUS: uterus is normal size, shape, consistency and nontender, retroverted ADNEXA: normal adnexa in size, nontender and no masses.  Extremities: Moves all extremities equally, no swelling.    US from 5/22/18:  US PELVIS NON OB LIMITED  5/22/2018 7:40 PM     INDICATION: Menorrhagia.  TECHNIQUE: Transabdominal scans were performed. Endovaginal ultrasound was performed to better visualize the adnexa.  COMPARISON: None.      FINDINGS:  Uterus measures 7.2 x 3 x 4.7 cm. Normal uterus with no masses.     Endometrial thickness is 9 mm. Normal smooth endometrium.      Right ovary measures 4.8 x 2.7 x 3.9 cm. Normal right ovary. Multiple small physiologic follicles. Normal arterial duplex.     Left ovary measures 4.5 x 2.6 x 3.1 cm. Normal left ovary. Multiple small physiologic follicles. Normal arterial duplex.     Minimal free pelvic fluid, within physiologic limits.     IMPRESSION:   CONCLUSION:     1.  Prominent sized bilateral ovaries with multiple follicles. Polycystic ovarian syndrome is a possible differential.   2.  Otherwise, unremarkable exam.     Assessment:   27 y.o. female  Contraceptive Management  Candidate for Mirena IUD, successful insertion today  Anemia  Irregular and heavy menses; likely anovulatory cycles  Obesity, BMI 35.4  PCOS based on clinical findings and recent pelvic US     Plan:     1. Discussed likelihood of PCOS considering patient symptoms, menstrual hx, and recent pelvic US. Contraceptive options in consideration of irregular and heavy menses discussed. Discussed CHCs as mainstay of therapy for women with PCOS who desire birth control but that progestin only IUDs can  also be used. Discussed that Nexaplnon may result in continued irregular bleeding/spotting (most common SE with this method). Adrien prefers not to have to remember to take anything and would like to see a decrease in bleeding so would like the Mirena IUD placed today.  2. See Procedure note below for IUD insertion.  -Advised to call for any fever or for prolonged or severe pain or bleeding.   -Advised to use OTC analgesics and heat as needed for mild to moderate discomfort.   -Warning signs (P-A-I-N-S) with use of IUD reviewed. RTC in 4-6 weeks for routine IUD check appointment.  -Recommended condom use or abstinence for 1 week. Protection from pregnancy will begin after 7 days of IUD placement. Condoms always for STI prevention.  -Nothing in vagina for 24 hours.  -IUD should be removed on 5/29/2023.  3. Labs: Pap collected today as patient has ever had one. Had normal TSH on 5/22/18. Will not repeat TSH at this time. Discussed lab work to help in diagnosis of PCOS and to observe for any further risk factors associated with PCOS: testosterone, prolactin, glucose, lipids. Pt aware to be fasting at least 8 hours. Labs ordered as future.  4. States she is seeing a colon specialist in about 1 week to f/u on rectal bleeding.    5. Encouraged weight loss with regular exercise x 30 min most days of the week.      Total time spent with patient: 40 minutes, >50% time spent counseling and coordination of care.        IUD Insertion Procedure Note    Pre-operative Diagnosis: irregular/heavy menses and desire for contraception    Post-operative Diagnosis: same    Indications: contraception    Procedure Details   Adrien Mcelroy was given an opportunity to ask questions about all forms of birth control, meaning all prescriptions, non-prescription, and natural methods. All of her questions were answered to her satisfaction and she understood all of those answers. The following benefits, risk/side effects, warning signs, discontinue use  option, regarding the birth control method were explained to her before she voluntarily decided to use this method of birth control.    BENEFITS: The IUD is 97-99% effective if all the directions regarding its use are followed carfeully. It can be more effective if used with foam or condoms mid-cycle between periods. IUDs containing progestin may decrease menstrual flow and painful menstrual periods. Mirena: 99.8 % effective x 5 yrs. Thickens cervical mucous to act as barrier to fertilization. Amenorrhea or irregular bleeding in first 3-6 months, and possible amenorrhea after 1 year of use.   RISKS/ SIDE EFFECTS  1. Spotting, bleeding, hemorrhage or anemia  2. Cramping or pain  3. Partial or complete expulsion of device leading to pregnancy, the pregnancy ending in miscarriage  4. Lost IUD string or other string problems  5. Puncturing of the uterus, embedding, or cervical perforation  6. Increased risk of pelvic inflammatory disease  WARNING SIGNS: The patient was advised  to call the clinic if she has any of the following early warning signs:  P - Period late (pregnancy), abnormal spotting or bleeding  A - Abdominal pain, pain with intercourse  I - Infection exposure (such as gonorrhea), abnormal discharge  N - Not feeling well, fever, chills  S - String missing, shorter or longer  ALTERNATIVES: She received verbal and written information about other methods of birth control and she chose the IUD.    DECISION TO DISCONTINUE USE: She understands that she may have the IUD removed at any time. She knows not try to remove the device and that it should be removed only by a medical provider. If she does not desire to become pregnant, she has been told she may request to have another IUD inserted or choose to use another method of birth control. If she wishes to become pregnant, she understands most women not using birth control become pregnant within 12 months.  Urine pregnancy test was done today and result was  negative. The risks (including infection, bleeding, pain, and uterine perforation) and benefits of the procedure were explained to the patient and verbal and Written informed consent was obtained. Verbal pre-procedural time out was completed with patient.     Bimanual exam performed to determine retroflexed position of uterus. Sterile speculum was placed. Cervix was cleansed with Betadine and sterile technique was maintained throughout the procedure. The tenaculum was applied to the anterior lip of the cervix and gentle traction applied to straighten and stabilize. Uterus sounded to 6.5cm. IUD inserted without difficulty. Strings visible and trimmed to 3 cm. Patient tolerated procedure well. Bleeding was minimal.    IUD Information: Mirena Lot # [id number], Expiration date 11/2020    Condition:  Stable    Complications:  None      CROW Thomson,MICHELLE

## 2021-06-18 NOTE — PROGRESS NOTES
Pt arrived ambulatory and was seated in chair 1. Reviewed plan of care. Placed IV and infused the 5th dose of iron sucrose over 1 hour - the IV line was flushed with NS (using a total volume of 250 mls). VSS. Sipped on water. Tolerated the infusion well. Left unit ambulatory in stable condition at 11:10, and plans to follow up with GENE Singleton CNP on July 30th.    Maria Elena Moscoso RN

## 2021-06-18 NOTE — PATIENT INSTRUCTIONS - HE
Patient Instructions by Meme Mukherjee MD at 4/27/2020  2:10 PM     Author: Meme Mukherjee MD Service: -- Author Type: Physician    Filed: 4/27/2020  2:10 PM Encounter Date: 4/27/2020 Status: Signed    : Meme Mukherjee MD (Physician)           Urinary Tract Infections in Women    Urinary tract infections (UTIs) are most often caused by bacteria. These bacteria enter the urinary tract. The bacteria may come from inside the body. Or they may travel from the skin outside the rectum or vagina into the urethra. Female anatomy makes it easy for bacteria from the bowel to enter a womans urinary tract, which is the most common source of UTI. This means women develop UTIs more often than men. Pain in or around the urinary tract is a common UTI symptom. But the only way to know for sure if you have a UTI for the healthcare provider to test your urine. The two tests that may be done are the urinalysis and urine culture.  Types of UTIs    Cystitis. A bladder infection (cystitis) is the most common UTI in women. You may have urgent or frequent need to pee. You may also have pain, burning when you pee, and bloody urine.    Urethritis. This is an inflamed urethra, which is the tube that carries urine from the bladder to outside the body. You may have lower stomach or back pain. You may also have urgent or frequent need to pee.    Pyelonephritis. This is a kidney infection. If not treated, it can be serious and damage your kidneys. In severe cases, you may need to stay in the hospital. You may have a fever and lower back pain.    Medicines to treat a UTI  Most UTIs are treated with antibiotics. These kill the bacteria. The length of time you need to take them depends on the type of infection. It may be as short as 3 days. If you have repeated UTIs, you may need a low-dose antibiotic for several months. Take antibiotics exactly as directed. Dont stop taking them until all of the medicine is gone. If you stop taking  the antibiotic too soon, the infection may not go away. You may also develop a resistance to the antibiotic. This can make it much harder to treat.  Lifestyle changes to treat and prevent UTIs  The lifestyle changes below will help get rid of your UTI. They may also help prevent future UTIs.    Drink plenty of fluids. This includes water, juice, or other caffeine-free drinks. Fluids help flush bacteria out of your body.    Empty your bladder. Always empty your bladder when you feel the urge to pee. And always pee before going to sleep. Urine that stays in your bladder can lead to infection. Try to pee before and after sex as well.    Practice good personal hygiene. Wipe yourself from front to back after using the toilet. This helps keep bacteria from getting into the urethra.    Use condoms during sex. These help prevent UTIs caused by sexually transmitted bacteria. Also don't use spermicides during sex. These can increase the risk for UTIs. Choose other forms of birth control instead. For women who tend to get UTIs after sex, a low-dose of a preventive antibiotic may be used. Be sure to discuss this option with your healthcare provider.    Follow up with your healthcare provider as directed. He or she may test to make sure the infection has cleared. If needed, more treatment may be started.  Date Last Reviewed: 7/1/2019 2000-2019 [employer]. [address] [place] [address], [place] [id number]. All rights reserved. This information is not intended as a substitute for professional medical care. Always follow your healthcare professional's instructions.        Adrien,  Usually we would ask you to bring a urine sample for evaluation of these symptoms. Given the ongoing pandemic, however, I would recommend we treat with an antibiotic without doing any testing. I have sent a prescription to your pharmacy for bactrim 1 tab twice daily for 5 days. Please contact me if your symptoms are not improving with this  treatment.    Meme Mukherjee MD

## 2021-06-18 NOTE — PROGRESS NOTES
Adrien arrived A&Ox4 ambulatory and stable, seated in chair # 1 . Pt states she is here for venofer to treat WILFRIDO. She also states she has had heavy periods and bleeding hemorrhoids. Pt is to see [facility] in the next couple of weeks and is hoping to have her hemorrhoids removed. She is currently using creams to help shrink her hemorrhoids but states she experiences bleeding with every stool.  Adrien states she has had venofer in the past and reports understanding of medication/POC. Pt states she has had nausea and headaches during and after infusion for several hours. Per KE, pt will be pre medicated with zofran 4mg p.o.; also infusion rate will be 80mL/hr as done with her past infusions  Pt was recently hospitalized for her her bleeding and low Hgb. She currently reports with exertion she is seeing stars, has dizziness, achy legs, chest tightness and shortness of breath. She also reports intermittent headaches and poor appetite. She is fatigued enough that she is only working part time and feeling very exhausted after working.  PIV w ease L AC (position of IV is at pt request), excellent blood return and line easily flushed NS.  0900 zofran 4mg IV given.   Venofer infused as ordered  rate 80mL/hr; connected to Y-site closest to patient. Also had UE940mC @ 80mL/hr concurrent.  Infusion completed w/o incident. Line flushed with NS until clear. Then remaining NS infused for hydration, pt stated she does not drink much water. PIV dc'd and site covered w cottonball/coban.  VSS  1200 Adrien dc'd A&Ox4 ambulatory and stable, will RTC wed 6/13/18 for next dose

## 2021-06-18 NOTE — PROGRESS NOTES
"Pt arrived ambulatory and was seated in chair 1. Reviewed plan of care and today's treatment - reviewed with the patient how the zofran IV is given and associated side effects; along with a discussion regarding the administration of the iron sucrose. Patient verbalized understanding. Placed IV easily in the left hand and used NS as the primary IV solution - zofran 4 mg IVP given with a running IV. Patient is sipping on water. Complaints of an intermittent dull headache, ringing of the ears, and \"some nausea post infusion, no vomiting.\" Infused the 2nd dose of venofer 200 mg over 1 hour - no c/o pain or nausea at completion. Flushed the IV line with NS, using a total volume of 250 mls. VSS. Pt left unit ambulatory in stable condition at 10:46, and plans to return on Friday.     Maria Elena Moscoso RN  "

## 2021-06-18 NOTE — PROGRESS NOTES
"Pt arrived ambulatory at 08:20, seated in chair 1. Reviewed plan of care and today's treatment. Placed IV and used NS as the primary IV solution - administered zofran 4 mg IVP, and then infused the second dose of venofer 200 mg over 1 hour. The IV line was flushed with NS before and after the iron sucrose, using a total volume of 250 mls of NS. At completion dc'd the IV and wrapped site securely with gauze and coban. VSS. Sipped on water. Patient continues to complain of \"a headache in the back of my head, a deep aching feeling - I have been using excedrin, and still have it.\" Writer contacted Mireille Tran NP electronically via IM, and was informed - patient has been instructed to call and make a follow up appointment with this provider. Pt states, \"my next appointment is on June 26th.\" Left unit ambulatory in stable condition at 10:34 and plans to return on Monday June 18th.    Maria Elena Moscoso RN  "

## 2021-06-18 NOTE — PROGRESS NOTES
"Assessment:      27 y.o., routine Mirena IUD check -  4 weeks post-insertion, correct placement confirmed.   Vaginal odor  Spotting x 3 weeks    Plan:     1.  Reassurance re: correct placement, normalcy of side effects and that these often lessen with extended use of IUD and that it may take 3-6 months. Taught and encouraged to check IUD strings monthly.    2.  Wet prep/GC/CT collected.  3.  F/u on PCOS: Pt is fasting unintentionally and so labs ordered from previous visit on 5/29/18 were obtained today. Will call patient to review when all labs resulted.      Subjective:      Adrien Mcelroy is a 27 y.o. female who presents for IUD check and for concern of vaginal odor and discharge since 1 day after placement of IUD. This was inserted on 5/29/18. The patient is sexually active. Reports being in a monogamous relationship with one male partner. She has checked her IUD strings. Denies vaginal itching, burning. Notes she had her normal period 1 week after placement and then has been spotting x 3 weeks. Wears a panty liner only and changes 5 x per day. Also noting headache a couple of times per week, localized to back of head on Left. States she is hydrating well (4 large water bottles per day), however states she hasn't eaten anything today and only had a sip of water since she's been busy. Last ate at 11pm yesterday. Excedrin relieves the headache. Denies aura.  Reports she knows spotting is vaginal and not rectal. Is seeing specialist for rectal bleeding and actually had hemorrhoids \"tied\" on Friday. States she has less pressure and pain in rectum since this procedure.    Review of Systems  Pertinent items are noted in HPI.     Objective:     Pelvic: SSE - normal vaginal and mucosal tissue. Foul odor present. Small amount spotting noted at cervical os. No erythema. Cervix normal appearance with IUD strings visible x 2 without evidence of IUD itself, Approximately 3 cm long strings. Bimanual exam: no evidence of IUD itself " per palpation of cervix; no tenderness.    CROW Thomson,WISAMM

## 2021-06-18 NOTE — PROGRESS NOTES
Pt arrived ambulatory at 09:02 and was seated in chair 1. Reviewed plan of care and today's treatment. Placed IV easily and used NS as the primary IV solution - administered the premedication of zofran 4 mg IVP; and then infused the 4th dose of iron sucrose 200 mg over 1 hour. The IV line was flushed with NS, and used a total volume of  mls. At completion dc'd the IV and wrapped site securely with gauze and coban. Declined an AVS. VSS. Left unit ambulatory in stable condition at 10:45, and plans to return on Wednesday.     Maria Elena Moscoso RN

## 2021-06-18 NOTE — PROGRESS NOTES
"  Subjective:      Adrien Mcelroy is a 27 y.o. female who presents today for follow up for hospitalization at [facility].    Adrien was admitted on 05/22/2018-50/24/2018 for fatigue, weakness, and headaches.  During the hospitalization she was worked up for symptomatic anemia due to chronic GI loss and heavy periods.  She states she was diagnosed with hemorrhoids during a colonoscopy in 2016.  Since 2016 she states she has intermittent bloody stools due to her hemorrhoids.  She was seen by Dr. Cárdenas (GI) in the hospital who initially was going to do a Flex. Sigmoid in the hospital but patient had deferred at that time.  She is to follow up per hospital note in 2 weeks with GI.  She endorses continued blood stools and states its \"a lot\".  She denies having an appetite, fever, chills, abdominal cramping, changes in stool, shortness of breath, and chest pain.    History of irregular menses with heavy menses.  She states she has always had menses that are 1 week apart that last for 1 full week.  During hospitalization a pelvic ultrasound was done and no findings of fibroids were noted.  She is due to follow up with OBGYN in 2 weeks.  She does have an appointment with  Midwives today for Nexplanon placement.  Discussed establishing care today with them.    Of note she states for the last month she has been vegetarian with no meat.  She states the fatigue is better today but previous days she has been very fatigued.    Reviewed past medical/surgical history, family history, social history, medications and updated chart below.       Allergies   Allergen Reactions     Other Environmental Allergy      Seasonal      Past Medical History:   Diagnosis Date     Anemia      Attention deficit hyperactivity disorder (ADHD), unspecified ADHD type 3/2/2016     Major depressive disorder, recurrent episode, moderate 3/2/2016     Past Surgical History:   Procedure Laterality Date     BACK SURGERY       LUMBAR FUSION  07/2010     WISDOM TOOTH " EXTRACTION       Social History     Social History     Marital status: Single     Spouse name: N/A     Number of children: 0     Years of education: N/A     Occupational History      [employer]     Social History Main Topics     Smoking status: Never Smoker     Smokeless tobacco: Never Used     Alcohol use 0.0 - 0.5 oz/week     0 - 1 Standard drinks or equivalent per week      Comment: social     Drug use: No     Sexual activity: No     Other Topics Concern     Not on file     Social History Narrative     Family History   Problem Relation Age of Onset     Lymphoma Father 48     Cancer Father      No Medical Problems Sister      No Medical Problems Brother      No Medical Problems Sister      No Medical Problems Sister      No Medical Problems Brother      Current Outpatient Prescriptions   Medication Sig Dispense Refill     acetaminophen-caff-pyrilamine (MIDOL COMPLETE) 500-60-15 mg Tab Take 1 tablet by mouth every 6 (six) hours as needed.       escitalopram oxalate (LEXAPRO) 20 MG tablet Take 20 mg by mouth daily as needed.       hydrocortisone 25 mg suppository Daily per rectum x 5 days 5 suppository 0     LORazepam (ATIVAN) 0.5 MG tablet Take 1 tablet (0.5 mg total) by mouth daily as needed for anxiety (Severe anxiety or panic). (Patient not taking: Reported on 5/29/2018) 30 tablet 3     multivitamin with minerals (THERA-M) 9 mg iron-400 mcg Tab tablet Take 1 tablet by mouth daily.       polyethylene glycol (GLYCOLAX) 17 gram/dose powder Take 17 g by mouth daily as needed.  0     psyllium (METAMUCIL) 3.4 gram packet Take 1 packet by mouth daily as needed. Stir in powder       No current facility-administered medications for this visit.      Patient Active Problem List    Diagnosis Date Noted     Lower GI bleeding 05/23/2018     Anemia 05/22/2018     Microcytic anemia 05/22/2018     Iron deficiency 05/22/2018     Menorrhagia with irregular cycle 05/22/2018     Iron deficiency anemia due to chronic blood loss  "04/18/2017     Vitamin D deficiency 06/21/2016     Anxiety 06/21/2016     Iron deficiency anemia 06/21/2016     Post concussive syndrome 06/21/2016     Hemorrhoids 06/21/2016     Major depressive disorder, recurrent episode, moderate 03/02/2016     Attention deficit hyperactivity disorder (ADHD), unspecified ADHD type 03/02/2016       Review of Systems   Constitutional: Negative.   HENT: Negative.   Eyes: Negative.   Respiratory: Negative.   Cardiovascular: Negative.   Gastrointestinal: Negative.   Endocrine: Negative.   Genitourinary: Hx of hemorroids with rectal bleeding.  Hx of irregular menses.  Musculoskeletal: Negative.   Skin: Negative.   Allergic/Immunologic: Negative.   Neurological: Negative.   Hematological: Hx of Iron deficiency anemia   Psychiatric/Behavioral: Negative.     Objective:    Physical Exam   /60 (Patient Site: Left Arm, Patient Position: Sitting, Cuff Size: Adult Large)  Pulse 72  Wt 193 lb 12.8 oz (87.9 kg)  LMP 05/19/2018  SpO2 96%  Breastfeeding? No  BMI 34.33 kg/m2    Constitutional: Alert and oriented x3, well nourished without any acute distress  Cardiovascular: Normal S1 and S2. Regular rate, rhythm and no murmur, rubs or gallops. Palpable distal pulses bilaterally.  Pulmonary/Chest: Normal effort. Lungs clear to auscultation in all lobes. Without wheezing, rhonchi or crackles.    Abdominal: Soft, non tenderness. There is no rebound or guarding. Bowel sounds x4. Without organomegaly. No CVA tenderness.  Skin: Dry and intact; without rashes or lesions.   Psychiatric: Normal mood and affect.     Assessment/Plan:    1. Anemia  2. Iron deficiency anemia  3. Follow up  Recent hospitalization of iron deficiency anemia due to chronic GI bleed and heavy menses.  Endorses \"a lot\" of blood in stool.  Will recheck blood and iron store levels today.  Discussed earlier follow up with GI-Dr. Cárdenas for continued bleeding.  Per discharge note-due for hemorrhoidectomy and colonoscopy as " outpatient.  Discussed red flags that would warrant urgent evaluation. Patient verbalized understanding.  Follow up if worsening symptoms, questions, or concerns.  Patient agreed and appeared pleased with plan      - HM2(CBC w/o Differential)  - Ferritin  - Iron and Transferrin Iron Binding Capacity    4. Fatigue  Increased fatigue with hospitalization due to chronic GI loss and heavy menses.  Endorses being vegetarian for past 1 month.  Will check b12 and vitamin d levels today as well.  History of Anxiety, depression, and ADHD.  Currently taking Lexapro.  Followed by  Mental health.  Denies thoughts of harming self, others, and suicidal ideations.  Will follow up once results are received.  Discussed with patient to follow up if worsening symptoms, questions, or concerns.    - Vitamin B12  - Vitamin D, Total (25-Hydroxy)    5. Menorrhagia  History of menorrhagia.  Due to follow up with OBGYN for possible PCOS in 2 weeks from discharge.  She has scheduled appointment with  Midwives today for Nexplanon placement.  Encouraged her to establish with them for formal workup for irregular menses.  Discussed with patient to follow up if worsening symptoms, questions, or concerns.    Office visit completed with DNP Student Taylor Leary.     Mireille Tran, TRA  5/29/2018

## 2021-06-19 NOTE — PROGRESS NOTES
[facility] Hematology and Oncology Progress Note    Patient: Adrien Mcelroy  MRN: [id number]  Date of Service:         Reason for Visit    Chief Complaint   Patient presents with     HE Cancer       Assessment and Plan    1.  Iron deficiency anemia: The last received IV iron in May in June of this year.  Her hemoglobin has responded appropriately.  Her MCV is now normal.  I am awaiting her ferritin I suspect that that has improved as well.  I will have patient return in 3 months to repeat labs.  Sooner if she develops symptoms of anemia    2.  Heavy menstrual bleeding: She has been seen by an OB/GYN and she had an IUD placed in June.  Having some daily bleeding but it is not heavy.  She is hoping that that will clear up soon.    3.  Rectal bleeding from hemorrhoids: Has resolved after getting banding done ×2    ECOG Performance   ECOG Performance Status: 0     Distress Assessment  Distress Assessment Score: 4    Pain  Currently in Pain: No/denies      Problem List    1. Iron deficiency anemia due to chronic blood loss  HM1(CBC and Differential)    Ferritin      ______________________________________________________________________________    History of Present Illness    DIAGNOSIS: iron deficiency anemia    TREATMENT: doesn't tolerate oral iron  Received 1200mg of IV iron from May 2018-June 2018  Received 1200 mg of IV iron in October - November 2017.  Received 2 units of blood and 1gram of venofer in April 2017. We gave 2 more units of blood again in August 2017    INTERIM HISTORY:   She is here in follow-up today.  She received iron about 6 weeks ago.  She states that she has been feeling good since then.  She does not feel that her hemoglobin would be low today.  Her breathing is stable.  She did have an IUD placed about 2 months ago.  She still has bleeding every day so she is hoping that that improves.  The OB/GYN said it could take a couple of months.  She says it is not heavy.  She also had some banding done on some  hemorrhoids and they have not been bleeding    Pain Status  Currently in Pain: No/denies    Review of Systems    Constitutional  Constitutional (WDL): Exceptions to WDL  Neurosensory  Neurosensory (WDL): All neurosensory elements are within defined limits  Eye   Eye Disorder (WDL): All eye disorder elements are within defined limits  Ear  Ear Disorder (WDL): All ear disorder elements are within defined limits  Cardiovascular  Cardiovascular (WDL): All cardiovascular elements are within defined limits  Pulmonary  Respiratory (WDL): Within Defined Limits  Gastrointestinal  Gastrointestinal (WDL): All gastrointestinal elements are within defined limits  Genitourinary  Genitourinary (WDL): All genitourinary elements are within defined limits  Lymphatic  Lymph (WDL): All lymph disorder elements are within defined limits  Musculoskeletal and Connective Tissue  Musculoskeletal and Connetive Tissue Disorders (WDL): All Musculoskeletal and Connetive Tissue Disorder elements are within defined limits  Integumentary  Integumentary (WDL): All integumentary elements are within defined limits  Patient Coping  Patient Coping: Accepting  Distress Assessment  Distress Assessment Score: 4  Accompanied by  Accompanied by: Alone  Oral Chemo Adherence       Past History  Past Medical History:   Diagnosis Date     Anemia      Attention deficit hyperactivity disorder (ADHD), unspecified ADHD type 3/2/2016     Major depressive disorder, recurrent episode, moderate (H) 3/2/2016       PHYSICAL EXAM:  /62  Pulse 86  Temp 98.2  F (36.8  C) (Oral)   Wt 195 lb (88.5 kg)  SpO2 97%  BMI 35.67 kg/m2    GENERAL: no acute distress. Cooperative in conversation. Here alone  HEENT: pupils are equal, round and reactive. Oromucosa is clean and intact. No ulcerations or mucositis noted. No bleeding noted.  Slightly pale  RESP: lungs are clear bilaterally per auscultation. Regular respiratory rate. No wheezes or rhonchi.  CV: Regular, rate and  rhythm. No murmurs.  ABD: soft, nontender. Positive bowel sounds. No organomegaly.   MUSCULOSKELETAL: No lower extremity swelling.   NEURO: non focal. Alert and oriented x3.   PSYCH: within normal limits. No depression or anxiety.  SKIN: warm dry intact   LYMPH: no cervical, supraclavicular lymphadenopathy    Lab Results    Lab Standing Order on 07/30/2018   Component Date Value Ref Range Status     WBC 07/30/2018 5.5  4.0 - 11.0 thou/uL Final     RBC 07/30/2018 4.70  3.80 - 5.40 mill/uL Final     Hemoglobin 07/30/2018 12.2  12.0 - 16.0 g/dL Final     Hematocrit 07/30/2018 38.1  35.0 - 47.0 % Final     MCV 07/30/2018 81  80 - 100 fL Final     MCH 07/30/2018 26.0* 27.0 - 34.0 pg Final     MCHC 07/30/2018 32.0  32.0 - 36.0 g/dL Final     RDW 07/30/2018 23.2* 11.0 - 14.5 % Final     Platelets 07/30/2018 332  140 - 440 thou/uL Final     MPV 07/30/2018 8.2* 8.5 - 12.5 fL Final     Neutrophils % 07/30/2018 57  50 - 70 % Final     Lymphocytes % 07/30/2018 34  20 - 40 % Final     Monocytes % 07/30/2018 5  2 - 10 % Final     Eosinophils % 07/30/2018 3  0 - 6 % Final     Basophils % 07/30/2018 0  0 - 2 % Final     Neutrophils Absolute 07/30/2018 3.1  2.0 - 7.7 thou/uL Final     Lymphocytes Absolute 07/30/2018 1.9  0.8 - 4.4 thou/uL Final     Monocytes Absolute 07/30/2018 0.3  0.0 - 0.9 thou/uL Final     Eosinophils Absolute 07/30/2018 0.2  0.0 - 0.4 thou/uL Final     Basophils Absolute 07/30/2018 0.0  0.0 - 0.2 thou/uL Final     Platelet Estimate 07/30/2018 Normal  Normal Final     Ovalocytes 07/30/2018 1+* Negative Final     Ferritin is pending.    Imaging    No results found.      Signed by: Latrice Singleton, CNP

## 2021-06-20 ENCOUNTER — HEALTH MAINTENANCE LETTER (OUTPATIENT)
Age: 31
End: 2021-06-20

## 2021-06-20 NOTE — LETTER
Letter by Annie Gay RN at      Author: Annie Gay RN Service: -- Author Type: --    Filed:  Encounter Date: 5/29/2020 Status: (Other)       5/29/2020        Adrien Wolf [facility] [id number]    This letter provides a written record that you were tested for COVID-19 on 5/27/20.     Your result was negative.    This means that we didnt find the virus that causes COVID-19 in your sample. A test may show negative when you do actually have the virus. This can happen when the virus is in the early stages of infection, before you feel illness symptoms.    Even if you dont have symptoms, they may still appear. For safety, its very important to follow these rules.    Keep yourself away from others (self-isolation):      Stay home. Dont go to work, school or anywhere else.     Stay in your own room (and use your own bathroom), if you can.    Stay away from others in your home. No hugging, kissing or shaking hands. No visitors.    Clean high touch surfaces often (doorknobs, counters, handles, etc.). Use a household cleaning spray or wipes.    Cover your mouth and nose with a mask, tissue or washcloth to avoid spreading germs.    Wash your hands and face often with soap and water.    Stay in self-isolation until you meet ALL of the guidelines below:    1. You have had no fever for at least 72 hours (that is 3 full days of no fever without the use of medicine that reduces fevers), AND  2. other symptoms (such as cough, shortness of breath) have gotten better, AND  3. at least 10 days have passed since your symptoms first appeared.    Going back to work  Check with your employer for any guidelines to follow for going back to work.    Employers: This document serves as formal notice that your employee tested negative for COVID-19, as of the testing date shown above.    For questions regarding this letter or your Negative COVID-19 result, call [phone] between 8A to 6:30P (M-F) and 10A to 6:30P  (weekends).
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
## 2021-06-22 NOTE — PROGRESS NOTES
Assessment/Plan:      Problem List Items Addressed This Visit     Allergic reaction, sequela     Symptoms suspicious for a food-based allergy, possibly papaya or egg plant.  She has an EpiPen to use if needed.  I recommended evaluation by an allergist and referral was placed.  She was instructed to avoid papaya and egg plant until further evaluation by the allergist.         Relevant Orders    Ambulatory referral to Allergy    Gastroesophageal reflux disease, esophagitis presence not specified - Primary     Labs including H. pylori, CBC, amylase, and hepatic panel.  Increase Pepcid to 20 mg twice daily.  She will be due for recheck of her other issues in early January and we can recheck this issue at that time.         Relevant Medications    famotidine (PEPCID) 20 MG tablet    Other Relevant Orders    H. pylori Antigen, Stool(HPSAG)    Hepatic Profile (Completed)    HM1(CBC and Differential) (Completed)    Amylase (Completed)    HM1 (CBC with Diff) (Completed)          Patient Instructions   1. Pepcid 20 mg twice daily.    2. We will notify you of lab results.  3. Referral to allergy placed.  You can call and schedule or wait for them to call you.  4.  Recheck for mood in early January.          Subjective:   Adrien Mcelroy is a 28 y.o. female who presents today for follow up on allergic reaction. She was seen in the ER on 12/1 with difficulty swallowing, confusion, facial rash, and tongue swelling. At this point she is still feeling fatigued and nauseated. No recurrence of rash. She has never had allergy testing in the past.    She has been having some ongoing reflux symptoms for about a month. She has been taking Pepto bismol. She started Pepcid about 3 days ago. It hasn't been helpful yet. She is taking just once daily. No prior history of reflux. She has lost 5 lbs in the last 2 weeks without effort. No URI symptoms. She did feel febrile last night but didn't check a temp. No diarrhea. No emesis. No alcohol use. Most  days no caffeine. No tobacco use. She reports that overall mood has been improving. She started on Lexapro about a month ago. That has been helpful for her mood in the past.     Patient Active Problem List   Diagnosis     Major depressive disorder, recurrent episode, moderate (H)     Attention deficit hyperactivity disorder (ADHD), unspecified ADHD type     Vitamin D deficiency     Generalized anxiety disorder     Post concussive syndrome     Hemorrhoids     PCOS (polycystic ovarian syndrome)     Obesity (BMI 30-39.9)     IUD (intrauterine device) in place     Allergic reaction, sequela     Gastroesophageal reflux disease, esophagitis presence not specified      Past Medical History:   Diagnosis Date     Anemia      Attention deficit hyperactivity disorder (ADHD), unspecified ADHD type 3/2/2016     Iron deficiency anemia due to chronic blood loss 4/18/2017     Major depressive disorder, recurrent episode, moderate (H) 3/2/2016     Menorrhagia with irregular cycle 5/22/2018     Past Surgical History:   Procedure Laterality Date     BAND HEMORRHOIDECTOMY  2018 x 4     LUMBAR FUSION  07/2010     WISDOM TOOTH EXTRACTION             Review of Systems  Pertinent items are noted in HPI.  ROS otherwise negative.    Objective:     Vitals:    12/04/18 0932 12/04/18 0947   BP: 122/82    Pulse: 64    Temp:  97.9  F (36.6  C)   TempSrc:  Oral   SpO2: 98%    Weight: 197 lb 9.6 oz (89.6 kg)    LMP: 12/01/2018       Physical Exam   Constitutional: She is oriented to person, place, and time. She appears well-nourished. No distress.   HENT:   Mouth/Throat: Oropharynx is clear and moist.   Neck: Normal range of motion. Neck supple. No thyromegaly present.   Cardiovascular: Normal rate and regular rhythm.   No murmur heard.  Pulmonary/Chest: Effort normal and breath sounds normal. No respiratory distress. She has no wheezes. She has no rales.   Lymphadenopathy:     She has no cervical adenopathy.   Neurological: She is alert and  oriented to person, place, and time. No cranial nerve deficit.   Skin: No rash noted.   Psychiatric: Her mood appears not anxious. Her affect is blunt. Her affect is not labile.

## 2021-06-23 NOTE — PATIENT INSTRUCTIONS - HE
1. You will get a call to schedule for pelvic ultrasound.  2.  Flonase nasal spray 1 spray each nostril daily for 14 days.  3.  If sinus symptoms not improving in 4 days, start amoxicillin 1 tab twice daily for 10 days.  4.  If you take the amoxicillin, please be sure to take a probiotic as well.

## 2021-06-23 NOTE — PROGRESS NOTES
Assessment/Plan:      Problem List Items Addressed This Visit     IUD (intrauterine device) in place    Relevant Orders    HM2(CBC w/o Differential) (Completed)    US Pelvis With Transvaginal Non OB      Other Visit Diagnoses     Viral illness    -  Primary    Cough        Relevant Orders    Influenza A/B Rapid Test (Completed)    Frequent menstruation        Relevant Orders    HM2(CBC w/o Differential) (Completed)    US Pelvis With Transvaginal Non OB    Acute non-recurrent maxillary sinusitis        Relevant Medications    amoxicillin (AMOXIL) 500 MG capsule        I suspect URI and diarrhea are due to a viral illness. Continue symptomatic treatment. Start flonase daily for 14 days. If sinus symptoms not improving in 4 days, start amoxicillin as well.    We discussed the persistent vaginal bleeding with her IUD in place.  Given that we are past 6 months, I would expect this to be slowing down now.  CBC is normal.  Pelvic ultrasound was ordered to confirm placement as well as evaluate for other potential sources.  The patient refused exam today as she was chaperoning another patient but will return for exam and review of results in 1 week.    Patient Instructions   1. You will get a call to schedule for pelvic ultrasound.  2.  Flonase nasal spray 1 spray each nostril daily for 14 days.  3.  If sinus symptoms not improving in 4 days, start amoxicillin 1 tab twice daily for 10 days.  4.  If you take the amoxicillin, please be sure to take a probiotic as well.      Return in about 1 week (around 2/12/2019) for Recheck frequent menses with IUD.    Subjective:   Adrien Mcelroy is a 28 y.o. female who presents today with congestion, diarrhea, and cough for about a week. No fever. No blood in stool. No emesis. She is having frequent diarrhea that is is persistent. Symptoms have been persistent. She is having ear pain as well. She has had some dizziness related to that as well.  She has been exposed to others with similar  "symptoms.    The patient is also complaining of persistent vaginal spotting.  She had a Mirena IUD placed April 2018.  Prior to the Mirena she was having heavy periods every 2 weeks.  Pap smear was done at that time and was normal.  She reports that now the bleeding is light but almost every day since April.  She has slight increase in bleeding after intercourse.    Patient Active Problem List   Diagnosis     Major depressive disorder, recurrent episode, moderate (H)     Attention deficit hyperactivity disorder (ADHD), unspecified ADHD type     Vitamin D deficiency     Generalized anxiety disorder     Post concussive syndrome     Hemorrhoids     PCOS (polycystic ovarian syndrome)     Obesity (BMI 30-39.9)     IUD (intrauterine device) in place     Allergic reaction, sequela     Gastroesophageal reflux disease, esophagitis presence not specified      Past Medical History:   Diagnosis Date     Anemia      Attention deficit hyperactivity disorder (ADHD), unspecified ADHD type 3/2/2016     Iron deficiency anemia due to chronic blood loss 4/18/2017     Major depressive disorder, recurrent episode, moderate (H) 3/2/2016     Menorrhagia with irregular cycle 5/22/2018     Past Surgical History:   Procedure Laterality Date     BAND HEMORRHOIDECTOMY  2018 x 4     LUMBAR FUSION  07/2010     WISDOM TOOTH EXTRACTION             Review of Systems  Pertinent items are noted in HPI.  ROS otherwise negative.    Objective:     Vitals:    02/05/19 1029   BP: 98/74   Pulse: 66   Resp: 16   Temp: 97.8  F (36.6  C)   TempSrc: Oral   SpO2: 97%   Weight: 202 lb 6.4 oz (91.8 kg)   Height: 5' 2.5\" (1.588 m)       Physical Exam   Constitutional: She appears well-nourished. No distress.   HENT:   Right Ear: Ear canal normal. Tympanic membrane is not erythematous, not retracted and not bulging. A middle ear effusion is present.   Left Ear: Ear canal normal. Tympanic membrane is not erythematous, not retracted and not bulging. A middle ear " effusion is present.   Nose: Right sinus exhibits maxillary sinus tenderness. Right sinus exhibits no frontal sinus tenderness. Left sinus exhibits maxillary sinus tenderness. Left sinus exhibits no frontal sinus tenderness.   Mouth/Throat: Oropharynx is clear and moist.   Cardiovascular: Normal rate and regular rhythm.   No murmur heard.  Pulmonary/Chest: Effort normal and breath sounds normal. No respiratory distress. She has no wheezes.   Abdominal: Soft. Bowel sounds are normal. There is no hepatosplenomegaly. There is no tenderness.   Genitourinary:   Genitourinary Comments:  exam refused today.     Results for orders placed or performed in visit on 02/05/19   Influenza A/B Rapid Test   Result Value Ref Range    Influenza  A, Rapid Antigen No Influenza A antigen detected No Influenza A antigen detected    Influenza B, Rapid Antigen No Influenza B antigen detected No Influenza B antigen detected   HM2(CBC w/o Differential)   Result Value Ref Range    WBC 4.8 4.0 - 11.0 thou/uL    RBC 5.20 3.80 - 5.40 mill/uL    Hemoglobin 15.7 12.0 - 16.0 g/dL    Hematocrit 46.7 35.0 - 47.0 %    MCV 90 80 - 100 fL    MCH 30.2 27.0 - 34.0 pg    MCHC 33.6 32.0 - 36.0 g/dL    RDW 12.5 11.0 - 14.5 %    Platelets 308 140 - 440 thou/uL    MPV 6.8 (L) 7.0 - 10.0 fL

## 2021-06-29 NOTE — PROGRESS NOTES
Progress Notes by Meme Mukherjee MD at 4/27/2020  2:10 PM     Author: Meme Mukherjee MD Service: -- Author Type: Physician    Filed: 4/27/2020  2:10 PM Encounter Date: 4/27/2020 Status: Signed    : Meme Mukherjee MD (Physician)       Provider E-Visit time total (minutes): 6    Assessment:   The encounter diagnosis was Acute cystitis without hematuria.     Plan:     Medications Ordered   Medications   ? sulfamethoxazole-trimethoprim (SEPTRA DS) 800-160 mg per tablet     Sig: Take 1 tablet by mouth 2 (two) times a day for 5 days.     Dispense:  10 tablet     Refill:  0     Patient Instructions       Urinary Tract Infections in Women    Urinary tract infections (UTIs) are most often caused by bacteria. These bacteria enter the urinary tract. The bacteria may come from inside the body. Or they may travel from the skin outside the rectum or vagina into the urethra. Female anatomy makes it easy for bacteria from the bowel to enter a womans urinary tract, which is the most common source of UTI. This means women develop UTIs more often than men. Pain in or around the urinary tract is a common UTI symptom. But the only way to know for sure if you have a UTI for the healthcare provider to test your urine. The two tests that may be done are the urinalysis and urine culture.  Types of UTIs    Cystitis. A bladder infection (cystitis) is the most common UTI in women. You may have urgent or frequent need to pee. You may also have pain, burning when you pee, and bloody urine.    Urethritis. This is an inflamed urethra, which is the tube that carries urine from the bladder to outside the body. You may have lower stomach or back pain. You may also have urgent or frequent need to pee.    Pyelonephritis. This is a kidney infection. If not treated, it can be serious and damage your kidneys. In severe cases, you may need to stay in the hospital. You may have a fever and lower back pain.    Medicines to treat a UTI  Most  UTIs are treated with antibiotics. These kill the bacteria. The length of time you need to take them depends on the type of infection. It may be as short as 3 days. If you have repeated UTIs, you may need a low-dose antibiotic for several months. Take antibiotics exactly as directed. Dont stop taking them until all of the medicine is gone. If you stop taking the antibiotic too soon, the infection may not go away. You may also develop a resistance to the antibiotic. This can make it much harder to treat.  Lifestyle changes to treat and prevent UTIs  The lifestyle changes below will help get rid of your UTI. They may also help prevent future UTIs.    Drink plenty of fluids. This includes water, juice, or other caffeine-free drinks. Fluids help flush bacteria out of your body.    Empty your bladder. Always empty your bladder when you feel the urge to pee. And always pee before going to sleep. Urine that stays in your bladder can lead to infection. Try to pee before and after sex as well.    Practice good personal hygiene. Wipe yourself from front to back after using the toilet. This helps keep bacteria from getting into the urethra.    Use condoms during sex. These help prevent UTIs caused by sexually transmitted bacteria. Also don't use spermicides during sex. These can increase the risk for UTIs. Choose other forms of birth control instead. For women who tend to get UTIs after sex, a low-dose of a preventive antibiotic may be used. Be sure to discuss this option with your healthcare provider.    Follow up with your healthcare provider as directed. He or she may test to make sure the infection has cleared. If needed, more treatment may be started.  Date Last Reviewed: 7/1/2019 2000-2019 [employer]. [address] [place] [address], [place] [address]. All rights reserved. This information is not intended as a substitute for professional medical care. Always follow your healthcare professional's  instructions.        Adrien,  Usually we would ask you to bring a urine sample for evaluation of these symptoms. Given the ongoing pandemic, however, I would recommend we treat with an antibiotic without doing any testing. I have sent a prescription to your pharmacy for bactrim 1 tab twice daily for 5 days. Please contact me if your symptoms are not improving with this treatment.    Meme Mukherjee MD     Return for further follow up if needed. Call [phone]) or [facility] an appointment [facility]    Subjective:   Adrien Mcelroy is a 29 y.o. female who submitted an eVisit request for evaluation of her Dysuria.  See the questionnaire and message section of encounter report for information related to history of present illness and review of systems.    The following portions of the patient's history were reviewed and updated as appropriate:  She does not have any pertinent problems on file.  She is allergic to ciprofloxacin and other environmental allergy..     Objective:   No exam performed today, patient submitted as eVisit.

## 2021-07-03 NOTE — ADDENDUM NOTE
Addendum Note by Pooja Su RN at 8/1/2017  3:36 PM     Author: Pooja Su RN Service: -- Author Type: Registered Nurse    Filed: 8/1/2017  3:36 PM Encounter Date: 8/1/2017 Status: Signed    : Pooja Su RN (Registered Nurse)    Addended by: POOJA US on: 8/1/2017 03:36 PM        Modules accepted: Orders

## 2021-07-14 PROBLEM — K92.2 LOWER GI BLEEDING: Status: RESOLVED | Noted: 2018-05-23 | Resolved: 2018-11-04

## 2021-10-10 ENCOUNTER — HEALTH MAINTENANCE LETTER (OUTPATIENT)
Age: 31
End: 2021-10-10

## 2022-07-17 ENCOUNTER — HEALTH MAINTENANCE LETTER (OUTPATIENT)
Age: 32
End: 2022-07-17

## 2022-09-24 ENCOUNTER — HEALTH MAINTENANCE LETTER (OUTPATIENT)
Age: 32
End: 2022-09-24

## 2023-05-09 ENCOUNTER — OFFICE VISIT (OUTPATIENT)
Dept: FAMILY MEDICINE | Facility: CLINIC | Age: 33
End: 2023-05-09
Payer: COMMERCIAL

## 2023-05-09 VITALS
OXYGEN SATURATION: 97 % | HEART RATE: 86 BPM | SYSTOLIC BLOOD PRESSURE: 124 MMHG | BODY MASS INDEX: 38.71 KG/M2 | WEIGHT: 218.5 LBS | DIASTOLIC BLOOD PRESSURE: 85 MMHG | HEIGHT: 63 IN

## 2023-05-09 DIAGNOSIS — E55.9 VITAMIN D DEFICIENCY: Primary | ICD-10-CM

## 2023-05-09 DIAGNOSIS — T78.2XXA ANAPHYLAXIS, INITIAL ENCOUNTER: ICD-10-CM

## 2023-05-09 DIAGNOSIS — T78.40XS ALLERGIC REACTION, SEQUELA: ICD-10-CM

## 2023-05-09 DIAGNOSIS — F33.1 MAJOR DEPRESSIVE DISORDER, RECURRENT EPISODE, MODERATE (H): ICD-10-CM

## 2023-05-09 DIAGNOSIS — Z97.5 IUD (INTRAUTERINE DEVICE) IN PLACE: ICD-10-CM

## 2023-05-09 DIAGNOSIS — F41.1 GENERALIZED ANXIETY DISORDER: ICD-10-CM

## 2023-05-09 DIAGNOSIS — G89.29 CHRONIC RIGHT SHOULDER PAIN: ICD-10-CM

## 2023-05-09 DIAGNOSIS — E28.2 PCOS (POLYCYSTIC OVARIAN SYNDROME): ICD-10-CM

## 2023-05-09 DIAGNOSIS — Z12.4 CERVICAL CANCER SCREENING: ICD-10-CM

## 2023-05-09 DIAGNOSIS — Z13.220 SCREENING, LIPID: ICD-10-CM

## 2023-05-09 DIAGNOSIS — K21.9 GASTROESOPHAGEAL REFLUX DISEASE WITHOUT ESOPHAGITIS: ICD-10-CM

## 2023-05-09 DIAGNOSIS — Z11.4 SCREENING FOR HIV (HUMAN IMMUNODEFICIENCY VIRUS): ICD-10-CM

## 2023-05-09 DIAGNOSIS — Z00.00 ENCOUNTER FOR PREVENTIVE CARE: ICD-10-CM

## 2023-05-09 DIAGNOSIS — M25.511 CHRONIC RIGHT SHOULDER PAIN: ICD-10-CM

## 2023-05-09 DIAGNOSIS — F90.9 ATTENTION DEFICIT HYPERACTIVITY DISORDER (ADHD), UNSPECIFIED ADHD TYPE: ICD-10-CM

## 2023-05-09 DIAGNOSIS — Z13.0 SCREENING, ANEMIA, DEFICIENCY, IRON: ICD-10-CM

## 2023-05-09 DIAGNOSIS — E66.01 CLASS 2 SEVERE OBESITY WITH SERIOUS COMORBIDITY AND BODY MASS INDEX (BMI) OF 39.0 TO 39.9 IN ADULT, UNSPECIFIED OBESITY TYPE (H): ICD-10-CM

## 2023-05-09 DIAGNOSIS — R40.0 HAS DAYTIME DROWSINESS: ICD-10-CM

## 2023-05-09 DIAGNOSIS — F07.81 POST CONCUSSIVE SYNDROME: ICD-10-CM

## 2023-05-09 DIAGNOSIS — E66.812 CLASS 2 SEVERE OBESITY WITH SERIOUS COMORBIDITY AND BODY MASS INDEX (BMI) OF 39.0 TO 39.9 IN ADULT, UNSPECIFIED OBESITY TYPE (H): ICD-10-CM

## 2023-05-09 DIAGNOSIS — N97.9 FEMALE INFERTILITY: ICD-10-CM

## 2023-05-09 DIAGNOSIS — Z11.59 NEED FOR HEPATITIS C SCREENING TEST: ICD-10-CM

## 2023-05-09 DIAGNOSIS — Z13.228 SCREENING FOR METABOLIC DISORDER: ICD-10-CM

## 2023-05-09 LAB
ERYTHROCYTE [DISTWIDTH] IN BLOOD BY AUTOMATED COUNT: 12.8 % (ref 10–15)
HCT VFR BLD AUTO: 41.8 % (ref 35–47)
HGB BLD-MCNC: 14.3 G/DL (ref 11.7–15.7)
MCH RBC QN AUTO: 30 PG (ref 26.5–33)
MCHC RBC AUTO-ENTMCNC: 34.2 G/DL (ref 31.5–36.5)
MCV RBC AUTO: 88 FL (ref 78–100)
PLATELET # BLD AUTO: 292 10E3/UL (ref 150–450)
RBC # BLD AUTO: 4.76 10E6/UL (ref 3.8–5.2)
WBC # BLD AUTO: 7.6 10E3/UL (ref 4–11)

## 2023-05-09 PROCEDURE — 80053 COMPREHEN METABOLIC PANEL: CPT | Performed by: FAMILY MEDICINE

## 2023-05-09 PROCEDURE — G0145 SCR C/V CYTO,THINLAYER,RESCR: HCPCS | Performed by: FAMILY MEDICINE

## 2023-05-09 PROCEDURE — 36415 COLL VENOUS BLD VENIPUNCTURE: CPT | Performed by: FAMILY MEDICINE

## 2023-05-09 PROCEDURE — 87624 HPV HI-RISK TYP POOLED RSLT: CPT | Performed by: FAMILY MEDICINE

## 2023-05-09 PROCEDURE — 99214 OFFICE O/P EST MOD 30 MIN: CPT | Mod: 25 | Performed by: FAMILY MEDICINE

## 2023-05-09 PROCEDURE — 82306 VITAMIN D 25 HYDROXY: CPT | Performed by: FAMILY MEDICINE

## 2023-05-09 PROCEDURE — 99385 PREV VISIT NEW AGE 18-39: CPT | Performed by: FAMILY MEDICINE

## 2023-05-09 PROCEDURE — 86803 HEPATITIS C AB TEST: CPT | Performed by: FAMILY MEDICINE

## 2023-05-09 PROCEDURE — 96127 BRIEF EMOTIONAL/BEHAV ASSMT: CPT | Performed by: FAMILY MEDICINE

## 2023-05-09 PROCEDURE — 85027 COMPLETE CBC AUTOMATED: CPT | Performed by: FAMILY MEDICINE

## 2023-05-09 PROCEDURE — 87389 HIV-1 AG W/HIV-1&-2 AB AG IA: CPT | Performed by: FAMILY MEDICINE

## 2023-05-09 RX ORDER — BUPROPION HYDROCHLORIDE 150 MG/1
150 TABLET ORAL EVERY MORNING
Qty: 30 TABLET | Refills: 1 | Status: SHIPPED | OUTPATIENT
Start: 2023-05-09 | End: 2023-06-27

## 2023-05-09 RX ORDER — EPINEPHRINE 0.3 MG/.3ML
0.3 INJECTION SUBCUTANEOUS PRN
Qty: 2 EACH | Refills: 0 | Status: SHIPPED | OUTPATIENT
Start: 2023-05-09

## 2023-05-09 ASSESSMENT — ENCOUNTER SYMPTOMS
WEAKNESS: 0
BREAST MASS: 0
PALPITATIONS: 0
DYSURIA: 0
HEMATURIA: 0
CHILLS: 0
ARTHRALGIAS: 0
SORE THROAT: 0
COUGH: 0
MYALGIAS: 1
ABDOMINAL PAIN: 0
EYE PAIN: 0
HEARTBURN: 0
NAUSEA: 0
NERVOUS/ANXIOUS: 0
DIARRHEA: 0
PARESTHESIAS: 0
DIZZINESS: 0
FEVER: 0
FREQUENCY: 0
SHORTNESS OF BREATH: 0
HEMATOCHEZIA: 0
JOINT SWELLING: 0
CONSTIPATION: 0
HEADACHES: 0

## 2023-05-09 ASSESSMENT — ANXIETY QUESTIONNAIRES
7. FEELING AFRAID AS IF SOMETHING AWFUL MIGHT HAPPEN: NOT AT ALL
GAD7 TOTAL SCORE: 6
1. FEELING NERVOUS, ANXIOUS, OR ON EDGE: NOT AT ALL
2. NOT BEING ABLE TO STOP OR CONTROL WORRYING: NOT AT ALL
IF YOU CHECKED OFF ANY PROBLEMS ON THIS QUESTIONNAIRE, HOW DIFFICULT HAVE THESE PROBLEMS MADE IT FOR YOU TO DO YOUR WORK, TAKE CARE OF THINGS AT HOME, OR GET ALONG WITH OTHER PEOPLE: NOT DIFFICULT AT ALL
6. BECOMING EASILY ANNOYED OR IRRITABLE: SEVERAL DAYS
GAD7 TOTAL SCORE: 6
7. FEELING AFRAID AS IF SOMETHING AWFUL MIGHT HAPPEN: NOT AT ALL
5. BEING SO RESTLESS THAT IT IS HARD TO SIT STILL: MORE THAN HALF THE DAYS
4. TROUBLE RELAXING: MORE THAN HALF THE DAYS
8. IF YOU CHECKED OFF ANY PROBLEMS, HOW DIFFICULT HAVE THESE MADE IT FOR YOU TO DO YOUR WORK, TAKE CARE OF THINGS AT HOME, OR GET ALONG WITH OTHER PEOPLE?: NOT DIFFICULT AT ALL
GAD7 TOTAL SCORE: 6
3. WORRYING TOO MUCH ABOUT DIFFERENT THINGS: SEVERAL DAYS

## 2023-05-09 ASSESSMENT — PATIENT HEALTH QUESTIONNAIRE - PHQ9
SUM OF ALL RESPONSES TO PHQ QUESTIONS 1-9: 11
SUM OF ALL RESPONSES TO PHQ QUESTIONS 1-9: 11
10. IF YOU CHECKED OFF ANY PROBLEMS, HOW DIFFICULT HAVE THESE PROBLEMS MADE IT FOR YOU TO DO YOUR WORK, TAKE CARE OF THINGS AT HOME, OR GET ALONG WITH OTHER PEOPLE: NOT DIFFICULT AT ALL

## 2023-05-09 NOTE — ASSESSMENT & PLAN NOTE
Two months of right shoulder pain. Declined ortho referral and declined physical therapy referral. Breasts are very large, she is interested in breast reduction some day. Also wants to try to lose weight.

## 2023-05-09 NOTE — ASSESSMENT & PLAN NOTE
Contraception - discussed. She would be happy if she got pregnant.   Recommended vaccines: COVID, influenza, and hepatitis B  Pap: Normal Pap noted in 2018, will repeat today.  Mammo:  There is no family or personal history, not indicated    Colonoscopy:  There is no family or personal history, not indicated     Std testing desired:  offered  Osteoporosis prevention discussed.  vitamin d levels ordered. Recommend daily calcium and vitamin d intake to keep good bone health. Recommend weight bearing exercise, no tobacco, and limit alcohol  dexa - no indication.  Recommend sunscreen, exercise, & healthy diet.  Offered cbc, cmp, lipids and asked what other testing she  desires today  I have had an Advance Directives discussion with the patient.   Body mass index is 39.01 kg/m .   mychart active.

## 2023-05-09 NOTE — ASSESSMENT & PLAN NOTE
"Allergic reaction  Notes from Dr. Mukherjee 2018 \"Symptoms suspicious for a food-based allergy, possibly papaya or egg plant.  She has an EpiPen to use if needed.  I recommended evaluation by an allergist and referral was placed.  She was instructed to avoid papaya and egg plant until further evaluation by the allergist.\" discussed.     She avoids these foods and has not had any problems, epi pen refilled.   "

## 2023-05-09 NOTE — LETTER
My Depression Action Plan  Name: Adrien Mcelroy   Date of Birth 1990  Date: 5/9/2023    My doctor: No primary care provider on file.   [facility] [address] [facility] [id number] [phone]            GREEN    ZONE   Good Control    What it looks like:   Things are going generally well. You have normal ups and downs. You may even feel depressed from time to time, but bad moods usually last less than a day.   What you need to do:  Continue to care for yourself (see self care plan)  Check your depression survival kit and update it as needed  Follow your physician s recommendations including any medication.  Do not stop taking medication unless you consult with your physician first.             YELLOW         ZONE Getting Worse    What it looks like:   Depression is starting to interfere with your life.   It may be hard to get out of bed; you may be starting to isolate yourself from others.  Symptoms of depression are starting to last most all day and this has happened for several days.   You may have suicidal thoughts but they are not constant.   What you need to do:     Call your care team. Your response to treatment will improve if you keep your care team informed of your progress. Yellow periods are signs an adjustment may need to be made.     Continue your self-care.  Just get dressed and ready for the day.  Don't give yourself time to talk yourself out of it.    Talk to someone in your support network.    Open up your Depression Self-Care Plan/Wellness Kit.             RED    ZONE Medical Alert - Get Help    What it looks like:   Depression is seriously interfering with your life.   You may experience these or other symptoms: You can t get out of bed most days, can t work or engage in other necessary activities, you have trouble taking care of basic hygiene, or basic responsibilities, thoughts of suicide or death that will not go away,  self-injurious behavior.     What you need to do:  Call your care team and request a same-day appointment. If they are not available (weekends or after hours) call your local crisis line, emergency room or 911.          Depression Self-Care Plan / Wellness Kit    Many people find that medication and therapy are helpful treatments for managing depression. In addition, making small changes to your everyday life can help to boost your mood and improve your wellbeing. Below are some tips for you to consider. Be sure to talk with your medical provider and/or behavioral health consultant if your symptoms are worsening or not improving.     Sleep   Sleep hygiene  means all of the habits that support good, restful sleep. It includes maintaining a consistent bedtime and wake time, using your bedroom only for sleeping or sex, and keeping the bedroom dark and free of distractions like a computer, smartphone, or television.     Develop a Healthy Routine  Maintain good hygiene. Get out of bed in the morning, make your bed, brush your teeth, take a shower, and get dressed. Don t spend too much time viewing media that makes you feel stressed. Find time to relax each day.    Exercise  Get some form of exercise every day. This will help reduce pain and release endorphins, the  feel good  chemicals in your brain. It can be as simple as just going for a walk or doing some gardening, anything that will get you moving.      Diet  Strive to eat healthy foods, including fruits and vegetables. Drink plenty of water. Avoid excessive sugar, caffeine, alcohol, and other mood-altering substances.     Stay Connected with Others  Stay in touch with friends and family members.    Manage Your Mood  Try deep breathing, massage therapy, biofeedback, or meditation. Take part in fun activities when you can. Try to find something to smile about each day.     Psychotherapy  Be open to working with a therapist if your provider recommends it.      Medication  Be sure to take your medication as prescribed. Most anti-depressants need to be taken every day. It usually takes several weeks for medications to work. Not all medicines work for all people. It is important to follow-up with your provider to make sure you have a treatment plan that is working for you. Do not stop your medication abruptly without first discussing it with your provider.    Crisis Resources   These hotlines are for both adults and children. They and are open 24 hours a day, 7 days a week unless noted otherwise.    National Suicide Prevention Lifeline   988 or 7-093-406-KEAT (9671)    Crisis Text Line    www.crisistextline.org  Text HOME to 445697 from anywhere in the [place], anytime, about any type of crisis. A live, trained crisis counselor will receive the text and respond quickly.    [facility] Lifeline for LGBTQ Youth  A national crisis intervention and suicide lifeline for LGBTQ youth under 25. Provides a safe place to talk without judgement. Call 1-875.719.4321; text START to 030717 or visit www.thetrevorproject.org to talk to a trained counselor.    For [facility] crisis numbers, visit the [facility] website at:  https://[place].gov/dhs/people-we-serve/adults/health-care/mental-health/resources/crisis-contacts.jsp

## 2023-05-09 NOTE — ASSESSMENT & PLAN NOTE
Infertility, trying to concieve x 1 year without success. Wants to try weight loss, declined referral to reproductive endo.

## 2023-05-09 NOTE — ASSESSMENT & PLAN NOTE
Generalized anxiety disorder -   phq9 - 11 and sharron 6  Recommend ssri and/ or counseling  She declined both.   See also adhd in the problem list.     In the meantime will try some wellbutrin for attention which may also help cooccurring depression and obesity.  But will need to watch anxiety as this is energizing and can increase anxiety.

## 2023-05-09 NOTE — ASSESSMENT & PLAN NOTE
depression - ssri and/ or counseling offered. She declined. phq9 - 11 and sharron is 6.     In the meantime will try some wellbutrin for attention which may also help cooccurring adhd and obesity.

## 2023-05-09 NOTE — ASSESSMENT & PLAN NOTE
Severe obesity as evidenced by BMI of 39.01 today and chronic comorbid weight related conditions of GERD, hemorrhoids, depression, and PCOS.    Weight reduction is strongly recommended.  Healthy lifestyle discussed.    In the meantime will try some wellbutrin for attention which may also help cooccurring depression.   She will schedule physician assisted weight loss intake.

## 2023-05-09 NOTE — ASSESSMENT & PLAN NOTE
Mirena placed 5/2018 due to heavy menses. Removal recommended 2025    When I mention this she says it is not actually still in place because it came out.   On exam there are no iud strings and patient is sure it came out.   I offered ultrasound to look for iud position but she is so sure it came out she declined.     After all this discussion she wanted the ultrasound afterall.

## 2023-05-09 NOTE — PROGRESS NOTES
Daytime drowsiness, Adhd, depression, obesity and sharron addressed above and beyond usual scope of annual exam.       31 mins total clinic time was spent with this patient outside and  beyond the prevent with review of the medical record and with documentation.  This time was spent on the day of service.       SUBJECTIVE:   CC: Adrien is an 32 year old who presents for preventive health visit.       5/9/2023     2:31 PM   Additional Questions   Roomed by as   Accompanied by self         5/9/2023     2:31 PM   Patient Reported Additional Medications   Patient reports taking the following new medications no   Patient has been advised of split billing requirements and indicates understanding: Yes  Healthy Habits:     Getting at least 3 servings of Calcium per day:  Yes    Bi-annual eye exam:  NO    Dental care twice a year:  NO    Sleep apnea or symptoms of sleep apnea:  Daytime drowsiness and Excessive snoring    Diet:  Breakfast skipped    Frequency of exercise:  None    Taking medications regularly:  Yes    Medication side effects:  Not applicable    PHQ-2 Total Score: 1    Additional concerns today:  Yes      Today's PHQ-2 Score:       5/9/2023     2:29 PM   PHQ-2 ( 1999 [employer])   Q1: Little interest or pleasure in doing things 0   Q2: Feeling down, depressed or hopeless 1   PHQ-2 Score 1   Q1: Little interest or pleasure in doing things Not at all   Q2: Feeling down, depressed or hopeless Several days   PHQ-2 Score 1       Have you ever done Advance Care Planning? (For example, a Health Directive, POLST, or a discussion with a medical provider or your loved ones about your wishes):     Social History     Tobacco Use     Smoking status: Never     Smokeless tobacco: Never   Vaping Use     Vaping status: Not on file   Substance Use Topics     Alcohol use: No     Alcohol/week: 0.0 - 0.8 standard drinks of alcohol           5/9/2023     2:29 PM   Alcohol Use   Prescreen: >3 drinks/day or >7 drinks/week? No     Reviewed  orders with patient.  Reviewed health maintenance and updated orders accordingly - Yes      Breast Cancer Screenin/9/2023     2:29 PM   Breast CA Risk Assessment (FHS-7)   Do you have a family history of breast, colon, or ovarian cancer? No / Unknown         Patient under 40 years of age: Routine Mammogram Screening not recommended.   Pertinent mammograms are reviewed under the imaging tab.    History of abnormal Pap smear: NO - age 30-65 PAP every 5 years with negative HPV co-testing recommended      2018     3:23 PM   PAP / HPV   PAP Negative for squamous intraepithelial lesion or malignancy  Electronically signed by Josey Layne CT (ASCP) on 2018 at  3:33 PM         Reviewed and updated as needed this visit by clinical staff   Tobacco  Allergies  Meds  Problems  Med Hx  Surg Hx  Fam Hx          Reviewed and updated as needed this visit by Provider   Tobacco  Allergies  Meds  Problems  Med Hx  Surg Hx  Fam Hx             Review of Systems   Constitutional: Negative for chills and fever.   HENT: Negative for congestion, ear pain, hearing loss and sore throat.    Eyes: Negative for pain and visual disturbance.   Respiratory: Negative for cough and shortness of breath.    Cardiovascular: Positive for chest pain. Negative for palpitations and peripheral edema.   Gastrointestinal: Negative for abdominal pain, constipation, diarrhea, heartburn, hematochezia and nausea.   Breasts:  Negative for tenderness, breast mass and discharge.   Genitourinary: Negative for dysuria, frequency, genital sores, hematuria, pelvic pain, urgency, vaginal bleeding and vaginal discharge.   Musculoskeletal: Positive for myalgias. Negative for arthralgias and joint swelling.   Skin: Negative for rash.   Neurological: Negative for dizziness, weakness, headaches and paresthesias.   Psychiatric/Behavioral: Negative for mood changes. The patient is not nervous/anxious.      Chest pain - notes pain in chest  "with lying supine, thinks it might be related to all the weight on her chest from her very large breasts- no exertional chest pain at all.  Myalgias - she has right shoulder pain.      OBJECTIVE:   /85 (BP Location: Left arm, Patient Position: Left side, Cuff Size: Adult Large)   Pulse 86   Ht 1.594 m (5' 2.75\")   Wt 99.1 kg (218 lb 8 oz)   LMP 04/30/2023 (Exact Date)   SpO2 97%   BMI 39.01 kg/m    Physical Exam  Constitutional:       Appearance: Normal appearance.   HENT:      Head: Normocephalic and atraumatic.      Right Ear: Tympanic membrane, ear canal and external ear normal.      Left Ear: Tympanic membrane, ear canal and external ear normal.      Nose: Nose normal.      Mouth/Throat:      Mouth: Mucous membranes are moist.      Pharynx: Oropharynx is clear.   Eyes:      Extraocular Movements: Extraocular movements intact.      Conjunctiva/sclera: Conjunctivae normal.      Pupils: Pupils are equal, round, and reactive to light.   Cardiovascular:      Rate and Rhythm: Normal rate and regular rhythm.      Heart sounds: Normal heart sounds.   Pulmonary:      Effort: Pulmonary effort is normal.      Breath sounds: Normal breath sounds.   Chest:   Breasts:     Breasts are symmetrical.      Right: Normal. No swelling, bleeding, inverted nipple, mass, nipple discharge, skin change or tenderness.      Left: Normal. No swelling, bleeding, inverted nipple, mass, nipple discharge, skin change or tenderness.   Abdominal:      General: Bowel sounds are normal.      Palpations: Abdomen is soft.   Genitourinary:     General: Normal vulva.      Exam position: Lithotomy position.      Vagina: Normal.      Cervix: Normal.      Uterus: Normal.       Adnexa: Right adnexa normal and left adnexa normal.      Rectum: Normal.   Musculoskeletal:         General: Normal range of motion.      Cervical back: Normal range of motion and neck supple.   Skin:     General: Skin is warm and dry.      Capillary Refill: Capillary " "refill takes less than 2 seconds.   Neurological:      General: No focal deficit present.      Mental Status: She is alert and oriented to person, place, and time.   Psychiatric:         Mood and Affect: Mood normal.         Behavior: Behavior normal.         Thought Content: Thought content normal.         Judgment: Judgment normal.               ASSESSMENT/PLAN:     Problem List Items Addressed This Visit        Nervous and Auditory    Chronic right shoulder pain     Two months of right shoulder pain. Declined ortho referral and declined physical therapy referral. Breasts are very large, she is interested in breast reduction some day. Also wants to try to lose weight.          RESOLVED: Post concussive syndrome     Resolved per patient, but she thinks this maybe led to her anxiety.             Digestive    Vitamin D deficiency - Primary     Vit d deficiency, will recheck level and titrate supplement.          Relevant Orders    Vitamin D Deficiency    Class 2 severe obesity with serious comorbidity and body mass index (BMI) of 39.0 to 39.9 in adult, unspecified obesity type (H)     Severe obesity as evidenced by BMI of 39.01 today and chronic comorbid weight related conditions of GERD, hemorrhoids, depression, and PCOS.    Weight reduction is strongly recommended.  Healthy lifestyle discussed.    In the meantime will try some wellbutrin for attention which may also help cooccurring depression.   She will schedule physician assisted weight loss intake.          Relevant Medications    buPROPion (WELLBUTRIN XL) 150 MG 24 hr tablet    Gastroesophageal reflux disease, esophagitis presence not specified     Gerd, managed with otc meds.             Endocrine    PCOS (polycystic ovarian syndrome)     Pcos, weight reduction recommended.            Immune    Allergic reaction, sequela     Allergic reaction  Notes from Dr. Mukherjee 2018 \"Symptoms suspicious for a food-based allergy, possibly papaya or egg plant.  She has an " "EpiPen to use if needed.  I recommended evaluation by an allergist and referral was placed.  She was instructed to avoid papaya and egg plant until further evaluation by the allergist.\" discussed.     She avoids these foods and has not had any problems, epi pen refilled.          Relevant Medications    buPROPion (WELLBUTRIN XL) 150 MG 24 hr tablet       Urinary    IUD (intrauterine device) in place     Mirena placed 5/2018 due to heavy menses. Removal recommended 2025    When I mention this she says it is not actually still in place because it came out.   On exam there are no iud strings and patient is sure it came out.   I offered ultrasound to look for iud position but she is so sure it came out she declined.     After all this discussion she wanted the ultrasound afterall.          Female infertility     Infertility, trying to concieve x 1 year without success. Wants to try weight loss, declined referral to reproductive endo.          Relevant Orders    US Pelvic Complete with Transvaginal       Behavioral    Major depressive disorder, recurrent episode, moderate (H)     depression - ssri and/ or counseling offered. She declined. phq9 - 11 and sharron is 6.     In the meantime will try some wellbutrin for attention which may also help cooccurring adhd and obesity.          Relevant Medications    buPROPion (WELLBUTRIN XL) 150 MG 24 hr tablet    Attention deficit hyperactivity disorder (ADHD), unspecified ADHD type     adhd - by chart review this was dx at age 23, but no formal testing was ever done, offere mental health assessment.     In the meantime will try some wellbutrin for attention which may also help cooccurring depression and obesity.          Relevant Orders    Adult Mental Health  Referral    Generalized anxiety disorder     Generalized anxiety disorder -   phq9 - 11 and sharron 6  Recommend ssri and/ or counseling  She declined both.   See also adhd in the problem list.     In the meantime will try " some wellbutrin for attention which may also help cooccurring depression and obesity.  But will need to watch anxiety as this is energizing and can increase anxiety.         Relevant Medications    buPROPion (WELLBUTRIN XL) 150 MG 24 hr tablet       Other    Encounter for preventive care     Contraception - discussed. She would be happy if she got pregnant.   Recommended vaccines: COVID, influenza, and hepatitis B  Pap: Normal Pap noted in 2018, will repeat today.  Mammo:  There is no family or personal history, not indicated    Colonoscopy:  There is no family or personal history, not indicated     Std testing desired:  offered  Osteoporosis prevention discussed.  vitamin d levels ordered. Recommend daily calcium and vitamin d intake to keep good bone health. Recommend weight bearing exercise, no tobacco, and limit alcohol  dexa - no indication.  Recommend sunscreen, exercise, & healthy diet.  Offered cbc, cmp, lipids and asked what other testing she  desires today  I have had an Advance Directives discussion with the patient.   Body mass index is 39.01 kg/m .   mychart active.          Has daytime drowsiness     Daytime drowsiness and bmi 39, sleep med consult ordered to eval for sleep apnea.          Relevant Orders    Adult Sleep Eval & Management Referral   Other Visit Diagnoses     Screening for HIV (human immunodeficiency virus)        Relevant Orders    HIV Antigen Antibody Combo    Need for hepatitis C screening test        Relevant Orders    Hepatitis C Screen Reflex to HCV RNA Quant and Genotype    Cervical cancer screening        Relevant Orders    Pap Screen with HPV - recommended age 30 - 65 years    Anaphylaxis, initial encounter        Relevant Medications    EPINEPHrine (ANY BX GENERIC EQUIV) 0.3 MG/0.3ML injection 2-pack    Screening for metabolic disorder        Relevant Orders    Comprehensive metabolic panel (BMP + Alb, Alk Phos, ALT, AST, Total. Bili, TP)    Screening, anemia, deficiency, iron   "      Relevant Orders    CBC with platelets    Screening, lipid        Relevant Orders    Lipid Profile          Patient has been advised of split billing requirements and indicates understanding: Yes      COUNSELING:  Reviewed preventive health counseling, as reflected in patient instructions       Regular exercise       Healthy diet/nutrition       Safe sex practices/STD prevention       Advance Care Planning      BMI:   Estimated body mass index is 39.01 kg/m  as calculated from the following:    Height as of this encounter: 1.594 m (5' 2.75\").    Weight as of this encounter: 99.1 kg (218 lb 8 oz).   Weight management plan: Discussed healthy diet and exercise guidelines      She reports that she has never smoked. She has never used smokeless tobacco.    Paola Vicente MD  [facility]  Answers for HPI/ROS submitted by the patient on 5/9/2023  If you checked off any problems, how difficult have these problems made it for you to do your work, take care of things at home, or get along with other people?: Not difficult at all  PHQ9 TOTAL SCORE: 11  JAVIER 7 TOTAL SCORE: 6      "

## 2023-05-10 LAB
ALBUMIN SERPL BCG-MCNC: 4.5 G/DL (ref 3.5–5.2)
ALP SERPL-CCNC: 80 U/L (ref 35–104)
ALT SERPL W P-5'-P-CCNC: 34 U/L (ref 10–35)
ANION GAP SERPL CALCULATED.3IONS-SCNC: 11 MMOL/L (ref 7–15)
AST SERPL W P-5'-P-CCNC: 27 U/L (ref 10–35)
BILIRUB SERPL-MCNC: 0.6 MG/DL
BUN SERPL-MCNC: 13.6 MG/DL (ref 6–20)
CALCIUM SERPL-MCNC: 10 MG/DL (ref 8.6–10)
CHLORIDE SERPL-SCNC: 103 MMOL/L (ref 98–107)
CREAT SERPL-MCNC: 0.71 MG/DL (ref 0.51–0.95)
DEPRECATED CALCIDIOL+CALCIFEROL SERPL-MC: 17 UG/L (ref 20–75)
DEPRECATED HCO3 PLAS-SCNC: 26 MMOL/L (ref 22–29)
GFR SERPL CREATININE-BSD FRML MDRD: >90 ML/MIN/1.73M2
GLUCOSE SERPL-MCNC: 82 MG/DL (ref 70–99)
HCV AB SERPL QL IA: NONREACTIVE
HIV 1+2 AB+HIV1 P24 AG SERPL QL IA: NONREACTIVE
POTASSIUM SERPL-SCNC: 4.3 MMOL/L (ref 3.4–5.3)
PROT SERPL-MCNC: 7.5 G/DL (ref 6.4–8.3)
SODIUM SERPL-SCNC: 140 MMOL/L (ref 136–145)

## 2023-05-11 LAB
BKR LAB AP GYN ADEQUACY: NORMAL
BKR LAB AP GYN INTERPRETATION: NORMAL
BKR LAB AP HPV REFLEX: NORMAL
BKR LAB AP PREVIOUS ABNORMAL: NORMAL
PATH REPORT.COMMENTS IMP SPEC: NORMAL
PATH REPORT.COMMENTS IMP SPEC: NORMAL
PATH REPORT.RELEVANT HX SPEC: NORMAL

## 2023-05-15 LAB
HUMAN PAPILLOMA VIRUS 16 DNA: NEGATIVE
HUMAN PAPILLOMA VIRUS 18 DNA: NEGATIVE
HUMAN PAPILLOMA VIRUS FINAL DIAGNOSIS: NORMAL
HUMAN PAPILLOMA VIRUS OTHER HR: NEGATIVE

## 2023-06-27 ENCOUNTER — OFFICE VISIT (OUTPATIENT)
Dept: FAMILY MEDICINE | Facility: CLINIC | Age: 33
End: 2023-06-27
Payer: COMMERCIAL

## 2023-06-27 VITALS
SYSTOLIC BLOOD PRESSURE: 112 MMHG | HEART RATE: 78 BPM | DIASTOLIC BLOOD PRESSURE: 77 MMHG | WEIGHT: 220 LBS | HEIGHT: 63 IN | OXYGEN SATURATION: 96 % | BODY MASS INDEX: 38.98 KG/M2

## 2023-06-27 DIAGNOSIS — Z13.29 SCREENING FOR THYROID DISORDER: ICD-10-CM

## 2023-06-27 DIAGNOSIS — Z13.220 SCREENING, LIPID: Primary | ICD-10-CM

## 2023-06-27 DIAGNOSIS — Z13.1 SCREENING FOR DIABETES MELLITUS: ICD-10-CM

## 2023-06-27 DIAGNOSIS — E55.9 VITAMIN D DEFICIENCY: ICD-10-CM

## 2023-06-27 DIAGNOSIS — E66.812 CLASS 2 SEVERE OBESITY WITH SERIOUS COMORBIDITY AND BODY MASS INDEX (BMI) OF 39.0 TO 39.9 IN ADULT, UNSPECIFIED OBESITY TYPE (H): ICD-10-CM

## 2023-06-27 DIAGNOSIS — E66.01 CLASS 2 SEVERE OBESITY WITH SERIOUS COMORBIDITY AND BODY MASS INDEX (BMI) OF 39.0 TO 39.9 IN ADULT, UNSPECIFIED OBESITY TYPE (H): ICD-10-CM

## 2023-06-27 PROCEDURE — 99215 OFFICE O/P EST HI 40 MIN: CPT | Performed by: FAMILY MEDICINE

## 2023-06-27 ASSESSMENT — ANXIETY QUESTIONNAIRES
5. BEING SO RESTLESS THAT IT IS HARD TO SIT STILL: SEVERAL DAYS
7. FEELING AFRAID AS IF SOMETHING AWFUL MIGHT HAPPEN: NOT AT ALL
GAD7 TOTAL SCORE: 5
1. FEELING NERVOUS, ANXIOUS, OR ON EDGE: SEVERAL DAYS
7. FEELING AFRAID AS IF SOMETHING AWFUL MIGHT HAPPEN: NOT AT ALL
8. IF YOU CHECKED OFF ANY PROBLEMS, HOW DIFFICULT HAVE THESE MADE IT FOR YOU TO DO YOUR WORK, TAKE CARE OF THINGS AT HOME, OR GET ALONG WITH OTHER PEOPLE?: SOMEWHAT DIFFICULT
GAD7 TOTAL SCORE: 5
2. NOT BEING ABLE TO STOP OR CONTROL WORRYING: NOT AT ALL
3. WORRYING TOO MUCH ABOUT DIFFERENT THINGS: SEVERAL DAYS
GAD7 TOTAL SCORE: 5
IF YOU CHECKED OFF ANY PROBLEMS ON THIS QUESTIONNAIRE, HOW DIFFICULT HAVE THESE PROBLEMS MADE IT FOR YOU TO DO YOUR WORK, TAKE CARE OF THINGS AT HOME, OR GET ALONG WITH OTHER PEOPLE: SOMEWHAT DIFFICULT
6. BECOMING EASILY ANNOYED OR IRRITABLE: SEVERAL DAYS
4. TROUBLE RELAXING: SEVERAL DAYS

## 2023-06-27 ASSESSMENT — PATIENT HEALTH QUESTIONNAIRE - PHQ9
10. IF YOU CHECKED OFF ANY PROBLEMS, HOW DIFFICULT HAVE THESE PROBLEMS MADE IT FOR YOU TO DO YOUR WORK, TAKE CARE OF THINGS AT HOME, OR GET ALONG WITH OTHER PEOPLE: SOMEWHAT DIFFICULT
SUM OF ALL RESPONSES TO PHQ QUESTIONS 1-9: 8
SUM OF ALL RESPONSES TO PHQ QUESTIONS 1-9: 8

## 2023-06-27 NOTE — ASSESSMENT & PLAN NOTE
SEVERE OBESITY WITH BMI 39.28 AT INTAKE AND CHRONIC COMORBID WEIGHT RELATED CONDITIONS INCULDING: Female infertility, PCOS, GERD, daytime drowsiness, hypovitaminosis D, depression and ADHD.    PATIENT IS BEGINNING ACTIVE MEDICALLY SUPERVISED WEIGHT LOSS STARTING AT Body mass index is 39.28 kg/m . - planning to utilize low calorie diet, physical activity and medications to facilitate this loss    Contraception - condoms - she understands she cannot become pregnant on weight loss medications as they have generally, not been tested in pregnancy. She would stop any weight loss meds prior to attempting pregnancy.    Patient declined 24 week weight loss program due to time constraints.   Patient accepted 3 pack health coaching.   We did not discuss food supplements, she declined.     We discussed surgical weight loss options given her BMI is 39.28 and she  has the following weight related comorbid conditions: Female infertility, PCOS, GERD, daytime drowsiness, hypovitaminosis D, depression and ADHD..  She is not interested now but may consider in the future.     Medications started today: saxenda with intent to bridge to wegovy. Back up contrave.     Current goal(s): start saxenda.     Lab assessment plan: orders placed for lipids, a1c, tsh.     Follow up 1-3 months.     DISCUSSION _________________________________________________________________    Dx: Initial bmi is Body mass index is 39.28 kg/m . With the following weight related comorbid medical conditions: Female infertility, PCOS, GERD, daytime drowsiness, hypovitaminosis D, depression and ADHD.    BECAUSE OF ABOVE PROBLEMS IT IS STRONGLY ADVISED THAT Adrien LOSE WEIGHT.  BELOW IS MY PLAN FOR her     Start weight 220 lbs, Body mass index is 39.28 kg/m .  6/27/2023     Medication notes:   Saxenda as a bridge to Wegovy is prescribed.  6/27/2023 rx given see avs.     Metformin - could consider.   Wellbutrin - could consider.   Naltrexone - could consider.   Contrave -  could consider.   Phentermine/ diethylproprion -could consider.   Topamax - could consider.   Qsymia -could consider.   Orlistat - could consider.   Plenity - could consider.

## 2023-06-27 NOTE — PATIENT INSTRUCTIONS
"Saxenda Pen Teaching Video:   https://www.[employer].com.com/about-saxenda/how-to-use-the-pen.html  -Scroll down to video to watch    Wegovy Pen Teaching Video:   https://www.[employer]/taking-wegovy/how-to-use-the-wegovy-pen.html  -Scroll down video labeled \"How to Use the Wegovy Pen\"     Wegovy start: Wegovy dosing is as shown below.  After the first 2 weeks of each \"step\" let me know how you are doing and I will send in the next step.  This requires a new prescription.  This is an opportunity to introduce error (clinic, pharmacy etc.).  We will (obviously) to our  best to not make an error.  Please ensure that the pharmacy dispenses the correct dose.  - 0.25 mg/week x 4 weeks  - 0.5 mg/week x 4 weeks  - 1 mg/week x 4 weeks  - 1.7 mg/week x 4 weeks  - 2.4 mg/week -ongoing    Wegovy start with Saxenda work around.  Currently the manufactures not distributing the 0.25, 0.5 and 1 mg/week dosing of this medication.  The workaround is to use Saxenda.  The workaround schedule is as follows: Saxenda dosing is as follows:  - Saxenda 0.6 mg/day x 7 days  - Saxenda 1.2 mg/day x 7 days  - Saxenda 1.8 mg/day x 7 days  - Saxenda 2.4 mg/day x 7 days  - Saxenda 3 mg/day-let us know how you are doing and we can transition to Wegovy.  This sometimes will require a new prior authorization through the insurance plan.  Wegovy dosing will be as follows:  - WEGOVY - 1.7 mg/week x 4 weeks  - WEGOVY - 2.4 mg/week -ongoing    The side effect profile for both of these medications is similar.  With each dose adjustment you can expect to feel some nausea.  This can last from hours to days.  Nausea is the most common reason for a person to be intolerant to these medications.    When an individual is on a GLP-1 receptor agonist such as Saxenda or Wegovy, I generally see the patient back approximately 10 weeks after starting the new medication.  The agenda for this visit is to review the benefit of the medication (as well as side effects), the " nutrition plan and any other considerations.  This can be done via video or face-to-face per the individual's preference.  We also need to see the patient back 5-6 months after starting the new medication.  This visit should be done face-to-face and is used to confirm ongoing insurance coverage for the medication.

## 2023-06-27 NOTE — Clinical Note
Aysha, hope you are well Adrien I interested in 3 pack health coaching. Could you  give her a call.
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
## 2023-06-28 ENCOUNTER — LAB (OUTPATIENT)
Dept: LAB | Facility: CLINIC | Age: 33
End: 2023-06-28
Payer: COMMERCIAL

## 2023-06-28 ENCOUNTER — MYC MEDICAL ADVICE (OUTPATIENT)
Dept: FAMILY MEDICINE | Facility: CLINIC | Age: 33
End: 2023-06-28

## 2023-06-28 ENCOUNTER — TELEPHONE (OUTPATIENT)
Dept: FAMILY MEDICINE | Facility: CLINIC | Age: 33
End: 2023-06-28

## 2023-06-28 DIAGNOSIS — E66.01 CLASS 2 SEVERE OBESITY WITH SERIOUS COMORBIDITY AND BODY MASS INDEX (BMI) OF 39.0 TO 39.9 IN ADULT, UNSPECIFIED OBESITY TYPE (H): Primary | ICD-10-CM

## 2023-06-28 DIAGNOSIS — Z13.220 SCREENING, LIPID: ICD-10-CM

## 2023-06-28 DIAGNOSIS — E66.812 CLASS 2 SEVERE OBESITY WITH SERIOUS COMORBIDITY AND BODY MASS INDEX (BMI) OF 39.0 TO 39.9 IN ADULT, UNSPECIFIED OBESITY TYPE (H): Primary | ICD-10-CM

## 2023-06-28 DIAGNOSIS — Z13.1 SCREENING FOR DIABETES MELLITUS: ICD-10-CM

## 2023-06-28 DIAGNOSIS — Z13.29 SCREENING FOR THYROID DISORDER: ICD-10-CM

## 2023-06-28 LAB
CHOLEST SERPL-MCNC: 177 MG/DL
HBA1C MFR BLD: 5.3 % (ref 0–5.6)
HDLC SERPL-MCNC: 50 MG/DL
LDLC SERPL CALC-MCNC: 97 MG/DL
NONHDLC SERPL-MCNC: 127 MG/DL
TRIGL SERPL-MCNC: 149 MG/DL
TSH SERPL DL<=0.005 MIU/L-ACNC: 2.91 UIU/ML (ref 0.3–4.2)

## 2023-06-28 PROCEDURE — 36415 COLL VENOUS BLD VENIPUNCTURE: CPT

## 2023-06-28 PROCEDURE — 83036 HEMOGLOBIN GLYCOSYLATED A1C: CPT

## 2023-06-28 PROCEDURE — 80061 LIPID PANEL: CPT

## 2023-06-28 PROCEDURE — 84443 ASSAY THYROID STIM HORMONE: CPT

## 2023-06-28 NOTE — TELEPHONE ENCOUNTER
PRIOR AUTHORIZATION DENIED    Medication: LIRAGLUTIDE -WEIGHT MANAGEMENT 18 MG/3ML SC SOPN  Insurance Company: [employer] - Phone [phone] Fax [phone]  Denial Date: 6/27/2023  Denial Rational:    Appeal Information:     Patient Notified: No

## 2023-06-29 RX ORDER — NALTREXONE HYDROCHLORIDE AND BUPROPION HYDROCHLORIDE 8; 90 MG/1; MG/1
TABLET, EXTENDED RELEASE ORAL
Qty: 120 TABLET | Refills: 0 | Status: SHIPPED | OUTPATIENT
Start: 2023-06-29 | End: 2023-06-30

## 2023-06-30 RX ORDER — NALTREXONE HYDROCHLORIDE AND BUPROPION HYDROCHLORIDE 8; 90 MG/1; MG/1
TABLET, EXTENDED RELEASE ORAL
Qty: 120 TABLET | Refills: 0 | Status: SHIPPED | OUTPATIENT
Start: 2023-06-30 | End: 2023-07-12 | Stop reason: SINTOL

## 2023-07-11 ENCOUNTER — MYC MEDICAL ADVICE (OUTPATIENT)
Dept: FAMILY MEDICINE | Facility: CLINIC | Age: 33
End: 2023-07-11
Payer: COMMERCIAL

## 2023-07-11 DIAGNOSIS — E66.812 CLASS 2 SEVERE OBESITY WITH SERIOUS COMORBIDITY AND BODY MASS INDEX (BMI) OF 39.0 TO 39.9 IN ADULT, UNSPECIFIED OBESITY TYPE (H): ICD-10-CM

## 2023-07-11 DIAGNOSIS — E66.01 CLASS 2 SEVERE OBESITY WITH SERIOUS COMORBIDITY AND BODY MASS INDEX (BMI) OF 39.0 TO 39.9 IN ADULT, UNSPECIFIED OBESITY TYPE (H): ICD-10-CM

## 2023-07-12 NOTE — ASSESSMENT & PLAN NOTE
mychart message. Not tolerating contrave. Gets n/v/ dizzy/ headache. So this was discontinued via mychart message

## 2023-09-19 ENCOUNTER — VIRTUAL VISIT (OUTPATIENT)
Dept: FAMILY MEDICINE | Facility: CLINIC | Age: 33
End: 2023-09-19
Payer: COMMERCIAL

## 2023-09-19 ENCOUNTER — OFFICE VISIT (OUTPATIENT)
Dept: FAMILY MEDICINE | Facility: CLINIC | Age: 33
End: 2023-09-19
Payer: COMMERCIAL

## 2023-09-19 VITALS
TEMPERATURE: 98.5 F | HEART RATE: 75 BPM | WEIGHT: 214.5 LBS | BODY MASS INDEX: 38.3 KG/M2 | SYSTOLIC BLOOD PRESSURE: 131 MMHG | OXYGEN SATURATION: 97 % | RESPIRATION RATE: 18 BRPM | DIASTOLIC BLOOD PRESSURE: 82 MMHG

## 2023-09-19 DIAGNOSIS — R07.89 CHEST DISCOMFORT: ICD-10-CM

## 2023-09-19 DIAGNOSIS — R05.1 ACUTE COUGH: Primary | ICD-10-CM

## 2023-09-19 DIAGNOSIS — R42 LIGHT HEADEDNESS: ICD-10-CM

## 2023-09-19 DIAGNOSIS — J20.9 ACUTE BRONCHITIS, UNSPECIFIED ORGANISM: Primary | ICD-10-CM

## 2023-09-19 PROCEDURE — 99214 OFFICE O/P EST MOD 30 MIN: CPT | Performed by: PHYSICIAN ASSISTANT

## 2023-09-19 PROCEDURE — 99207 PR NO CHARGE LOS: CPT | Mod: 95

## 2023-09-19 RX ORDER — PREDNISONE 20 MG/1
40 TABLET ORAL DAILY
Qty: 10 TABLET | Refills: 0 | Status: SHIPPED | OUTPATIENT
Start: 2023-09-19 | End: 2023-09-24

## 2023-09-19 RX ORDER — ALBUTEROL SULFATE 90 UG/1
1-2 AEROSOL, METERED RESPIRATORY (INHALATION) EVERY 6 HOURS
Qty: 8.5 G | Refills: 0 | Status: SHIPPED | OUTPATIENT
Start: 2023-09-19 | End: 2024-04-17

## 2023-09-19 ASSESSMENT — PATIENT HEALTH QUESTIONNAIRE - PHQ9
SUM OF ALL RESPONSES TO PHQ QUESTIONS 1-9: 9
SUM OF ALL RESPONSES TO PHQ QUESTIONS 1-9: 9
10. IF YOU CHECKED OFF ANY PROBLEMS, HOW DIFFICULT HAVE THESE PROBLEMS MADE IT FOR YOU TO DO YOUR WORK, TAKE CARE OF THINGS AT HOME, OR GET ALONG WITH OTHER PEOPLE: NOT DIFFICULT AT ALL

## 2023-09-19 ASSESSMENT — ENCOUNTER SYMPTOMS
COUGH: 1
DIARRHEA: 1

## 2023-09-19 NOTE — PROGRESS NOTES
URGENT CARE VISIT:    SUBJECTIVE:   Adrien Mcelroy is a 32 year old female presenting with a chief complaint of cough - productive, shortness of breath, diarrhea, and chest discomfort.  Onset was 1 week(s) ago.   She denies the following symptoms: vomiting and diarrhea  Course of illness is same.    Treatment measures tried include None tried with no relief of symptoms.  Predisposing factors include None.    PMH:   Past Medical History:   Diagnosis Date    Attention deficit hyperactivity disorder (ADHD), unspecified ADHD type 03/02/2016    Hemorrhoids 06/21/2016    Iron deficiency anemia due to chronic blood loss 04/18/2017    Major depressive disorder, recurrent episode, moderate (H) 03/02/2016    Menorrhagia with irregular cycle 05/22/2018    Post concussive syndrome 06/21/2016     Allergies: Covid-19 (mrna) vaccine, Other drug allergy (see comments), Ciprofloxacin, Contrave [naltrexone-bupropion hcl er], and Other environmental allergy   Medications:   Current Outpatient Medications   Medication Sig Dispense Refill    albuterol (PROAIR HFA/PROVENTIL HFA/VENTOLIN HFA) 108 (90 Base) MCG/ACT inhaler Inhale 1-2 puffs into the lungs every 6 hours for 5 days 8.5 g 0    predniSONE (DELTASONE) 20 MG tablet Take 2 tablets (40 mg) by mouth daily for 5 days 10 tablet 0    EPINEPHrine (ANY BX GENERIC EQUIV) 0.3 MG/0.3ML injection 2-pack Inject 0.3 mLs (0.3 mg) into the muscle as needed (Patient not taking: Reported on 9/19/2023) 2 each 0     Social History:   Social History     Tobacco Use    Smoking status: Never    Smokeless tobacco: Never   Substance Use Topics    Alcohol use: No     Alcohol/week: 0.0 - 0.8 standard drinks of alcohol       ROS:  General: negative  Skin: negative  Eyes: negative  Ears/Nose/Throat: negative  Respiratory: Cough  Cardiovascular: negative  Gastrointestinal: negative  Genitourinary: negative  Musculoskeletal: negative  Neurologic: negative      OBJECTIVE:  /82 (BP Location: Left arm, Patient  Position: Sitting, Cuff Size: Adult Regular)   Pulse 75   Temp 98.5  F (36.9  C) (Oral)   Resp 18   Wt 97.3 kg (214 lb 8 oz)   LMP 06/19/2023 (Approximate)   SpO2 97%   BMI 38.30 kg/m    GENERAL APPEARANCE: healthy, alert and no distress  EYES: EOMI,  PERRL, conjunctiva clear  HENT: ear canals and TM's normal.  Nose and mouth without ulcers, erythema or lesions  NECK: supple, nontender, no lymphadenopathy  RESP: lungs clear to auscultation - no rales, rhonchi or wheezes  CV: regular rates and rhythm, normal S1 S2, no murmur noted  SKIN: no suspicious lesions or rashes      ASSESSMENT:    ICD-10-CM    1. Acute bronchitis, unspecified organism  J20.9 albuterol (PROAIR HFA/PROVENTIL HFA/VENTOLIN HFA) 108 (90 Base) MCG/ACT inhaler     predniSONE (DELTASONE) 20 MG tablet          PLAN:  Patient Instructions   Patient was educated on the natural course of condition. Take medications as directed. Side effects discussed. Conservative measures discussed including rest, increased fluids, humidifier, and over-the-counter cough suppressants. See your primary care provider if symptoms do not improve in 7 days. Seek emergency care if you develop shortness of breath or fever over 103.    Patient verbalized understanding and is agreeable to plan. The patient was discharged ambulatory and in stable condition.    Erica Andrew PA-C ....................  9/19/2023   6:42 PM

## 2023-09-19 NOTE — PROGRESS NOTES
Adrien is a 32 year old who is being evaluated via a billable video visit.      How would you like to obtain your AVS? MyChart  If the video visit is dropped, the invitation should be resent by: Text to cell phone: [phone]  Will anyone else be joining your video visit? No      Assessment & Plan   Problem List Items Addressed This Visit    None  Visit Diagnoses       Acute cough    -  Primary    Chest discomfort        Light headedness               Discussed with patient today that due to the constellation of her symptoms, including lightheadedness, chest, and intermittent difficulty breathing, I believe that she would be best served by an in person appointment due to the limitations behind virtual care.  Additionally, she is having gastrointestinal symptoms like diarrhea with abdominal pain which I am unable to assess comprehensively. She does not appear toxic on exam. It remains a potential that she has a benign upper respiratory infection, but due to the constellation of symptoms, I think this warrants a vitals check in addition to some basic blood work and physical examination.  Patient is amenable to this and in agreement.  She will plan to present either in urgent care or walk-in care later this afternoon.    Jamila Correa, [facility]   Adrien is a 32 year old, presenting for the following health issues:  Cough (Greater than 7 days, congestion as well worse at night), Ear Problem (Fullness 7 days), and Diarrhea (With abdominal pain)      9/19/2023     1:16 PM   Additional Questions   Roomed by ac   Accompanied by self     -Symptoms for a little over a week  -Has been coughing, sore throat, chest congestion/discomfort. Left sided chest pain. Intermittent difficulty breathing with light headedness. Diarrhea.  -Symptoms worsen at night   -Not able to lie flat at night due to chest discomfort and coughing  -No fevers   -She has had abdominal pain with diarrhea.    -She has not had a menstrual cycle for three months. She does not use any contraception. Prior to becoming amenorrheic, she was having two cycles a month. She has not been seen for this.     History of Present Illness       Reason for visit:  Cough and missed period for 3 months  Symptom onset:  1-2 weeks ago  Symptoms include:  Cough (worsen at night), sore throat, congestion, chest discomfort, difficulty breathing, diarrhea and unable to sleep or lay down comfortably  Symptom intensity:  Moderate  Symptom progression:  Worsening  Had these symptoms before:  No  What makes it worse:  Laying down  What makes it better:  Taking medication    She eats 2-3 servings of fruits and vegetables daily.She consumes 1 sweetened beverage(s) daily.She exercises with enough effort to increase her heart rate 10 to 19 minutes per day.  She exercises with enough effort to increase her heart rate 3 or less days per week.   She is taking medications regularly.     Review of Systems   HENT:  Positive for ear pain.    Respiratory:  Positive for cough.    Gastrointestinal:  Positive for diarrhea.         Objective    Vitals - Patient Reported  Weight (Patient Reported): 96.6 kg (213 lb)    Physical Exam   GENERAL: Healthy, alert and no distress  EYES: Eyes grossly normal to inspection.  No discharge or erythema, or obvious scleral/conjunctival abnormalities.  RESP: No audible wheeze or visible cyanosis.  Intermittent, dry cough. No visible retractions or increased work of breathing.    SKIN: Visible skin clear. No significant rash, abnormal pigmentation or lesions.  NEURO: Cranial nerves grossly intact.  Mentation and speech appropriate for age.  PSYCH: Mentation appears normal, affect normal/bright, judgement and insight intact, normal speech and appearance well-groomed.          Video-Visit Details    Type of service:  Video Visit   Video Start Time:  1:41  Video End Time: 1:53    Originating Location (pt. Location): Home    Distant  Location (provider location):  On-site  Platform used for Video Visit: Alex

## 2024-01-15 ENCOUNTER — OFFICE VISIT (OUTPATIENT)
Dept: FAMILY MEDICINE | Facility: CLINIC | Age: 34
End: 2024-01-15
Payer: COMMERCIAL

## 2024-01-15 VITALS
OXYGEN SATURATION: 97 % | SYSTOLIC BLOOD PRESSURE: 119 MMHG | DIASTOLIC BLOOD PRESSURE: 79 MMHG | WEIGHT: 210.7 LBS | HEART RATE: 69 BPM | TEMPERATURE: 97.6 F | RESPIRATION RATE: 20 BRPM | BODY MASS INDEX: 37.62 KG/M2

## 2024-01-15 DIAGNOSIS — R11.0 NAUSEA: ICD-10-CM

## 2024-01-15 DIAGNOSIS — R10.12 LUQ ABDOMINAL PAIN: ICD-10-CM

## 2024-01-15 DIAGNOSIS — N92.6 MISSED MENSES: ICD-10-CM

## 2024-01-15 DIAGNOSIS — J06.9 VIRAL URI WITH COUGH: Primary | ICD-10-CM

## 2024-01-15 DIAGNOSIS — R05.1 ACUTE COUGH: ICD-10-CM

## 2024-01-15 LAB
ALBUMIN SERPL BCG-MCNC: 4.4 G/DL (ref 3.5–5.2)
ALBUMIN UR-MCNC: NEGATIVE MG/DL
ALP SERPL-CCNC: 70 U/L (ref 40–150)
ALT SERPL W P-5'-P-CCNC: 67 U/L (ref 0–50)
ANION GAP SERPL CALCULATED.3IONS-SCNC: 12 MMOL/L (ref 7–15)
APPEARANCE UR: ABNORMAL
AST SERPL W P-5'-P-CCNC: 50 U/L (ref 0–45)
BACTERIA #/AREA URNS HPF: ABNORMAL /HPF
BASOPHILS # BLD AUTO: 0 10E3/UL (ref 0–0.2)
BASOPHILS NFR BLD AUTO: 0 %
BILIRUB SERPL-MCNC: 0.7 MG/DL
BILIRUB UR QL STRIP: ABNORMAL
BUN SERPL-MCNC: 11.4 MG/DL (ref 6–20)
CALCIUM SERPL-MCNC: 9.3 MG/DL (ref 8.6–10)
CAOX CRY #/AREA URNS HPF: ABNORMAL /HPF
CHLORIDE SERPL-SCNC: 104 MMOL/L (ref 98–107)
COLOR UR AUTO: YELLOW
CREAT SERPL-MCNC: 0.68 MG/DL (ref 0.51–0.95)
DEPRECATED HCO3 PLAS-SCNC: 26 MMOL/L (ref 22–29)
EGFRCR SERPLBLD CKD-EPI 2021: >90 ML/MIN/1.73M2
EOSINOPHIL # BLD AUTO: 0.1 10E3/UL (ref 0–0.7)
EOSINOPHIL NFR BLD AUTO: 2 %
ERYTHROCYTE [DISTWIDTH] IN BLOOD BY AUTOMATED COUNT: 11.7 % (ref 10–15)
FLUAV AG SPEC QL IA: NEGATIVE
FLUBV AG SPEC QL IA: NEGATIVE
GLUCOSE SERPL-MCNC: 98 MG/DL (ref 70–99)
GLUCOSE UR STRIP-MCNC: NEGATIVE MG/DL
HCG UR QL: NEGATIVE
HCT VFR BLD AUTO: 44.3 % (ref 35–47)
HGB BLD-MCNC: 15.6 G/DL (ref 11.7–15.7)
HGB UR QL STRIP: NEGATIVE
HYALINE CASTS #/AREA URNS LPF: ABNORMAL /LPF
IMM GRANULOCYTES # BLD: 0 10E3/UL
IMM GRANULOCYTES NFR BLD: 0 %
KETONES UR STRIP-MCNC: 40 MG/DL
LEUKOCYTE ESTERASE UR QL STRIP: NEGATIVE
LIPASE SERPL-CCNC: 31 U/L (ref 13–60)
LYMPHOCYTES # BLD AUTO: 2.1 10E3/UL (ref 0.8–5.3)
LYMPHOCYTES NFR BLD AUTO: 41 %
MCH RBC QN AUTO: 30.5 PG (ref 26.5–33)
MCHC RBC AUTO-ENTMCNC: 35.2 G/DL (ref 31.5–36.5)
MCV RBC AUTO: 87 FL (ref 78–100)
MONOCYTES # BLD AUTO: 0.4 10E3/UL (ref 0–1.3)
MONOCYTES NFR BLD AUTO: 8 %
MUCOUS THREADS #/AREA URNS LPF: PRESENT /LPF
NEUTROPHILS # BLD AUTO: 2.5 10E3/UL (ref 1.6–8.3)
NEUTROPHILS NFR BLD AUTO: 49 %
NITRATE UR QL: NEGATIVE
PH UR STRIP: 6.5 [PH] (ref 5–8)
PLATELET # BLD AUTO: 268 10E3/UL (ref 150–450)
POTASSIUM SERPL-SCNC: 4.2 MMOL/L (ref 3.4–5.3)
PROT SERPL-MCNC: 7.6 G/DL (ref 6.4–8.3)
RBC # BLD AUTO: 5.11 10E6/UL (ref 3.8–5.2)
RBC #/AREA URNS AUTO: ABNORMAL /HPF
SODIUM SERPL-SCNC: 142 MMOL/L (ref 135–145)
SP GR UR STRIP: 1.02 (ref 1–1.03)
SQUAMOUS #/AREA URNS AUTO: ABNORMAL /LPF
UROBILINOGEN UR STRIP-ACNC: 1 E.U./DL
WBC # BLD AUTO: 5.1 10E3/UL (ref 4–11)
WBC #/AREA URNS AUTO: ABNORMAL /HPF

## 2024-01-15 PROCEDURE — 80053 COMPREHEN METABOLIC PANEL: CPT | Performed by: NURSE PRACTITIONER

## 2024-01-15 PROCEDURE — 85025 COMPLETE CBC W/AUTO DIFF WBC: CPT | Performed by: NURSE PRACTITIONER

## 2024-01-15 PROCEDURE — 81025 URINE PREGNANCY TEST: CPT | Performed by: NURSE PRACTITIONER

## 2024-01-15 PROCEDURE — 87804 INFLUENZA ASSAY W/OPTIC: CPT | Performed by: NURSE PRACTITIONER

## 2024-01-15 PROCEDURE — 83690 ASSAY OF LIPASE: CPT | Performed by: NURSE PRACTITIONER

## 2024-01-15 PROCEDURE — 36415 COLL VENOUS BLD VENIPUNCTURE: CPT | Performed by: NURSE PRACTITIONER

## 2024-01-15 PROCEDURE — 87635 SARS-COV-2 COVID-19 AMP PRB: CPT | Performed by: NURSE PRACTITIONER

## 2024-01-15 PROCEDURE — 81001 URINALYSIS AUTO W/SCOPE: CPT | Performed by: NURSE PRACTITIONER

## 2024-01-15 PROCEDURE — 99214 OFFICE O/P EST MOD 30 MIN: CPT | Performed by: NURSE PRACTITIONER

## 2024-01-15 PROCEDURE — 87086 URINE CULTURE/COLONY COUNT: CPT | Performed by: NURSE PRACTITIONER

## 2024-01-15 RX ORDER — ONDANSETRON 4 MG/1
4 TABLET, ORALLY DISINTEGRATING ORAL ONCE
Status: COMPLETED | OUTPATIENT
Start: 2024-01-15 | End: 2024-01-15

## 2024-01-15 RX ORDER — ALUMINA, MAGNESIA, AND SIMETHICONE 2400; 2400; 240 MG/30ML; MG/30ML; MG/30ML
15 SUSPENSION ORAL ONCE
Status: COMPLETED | OUTPATIENT
Start: 2024-01-15 | End: 2024-01-15

## 2024-01-15 RX ORDER — ACETAMINOPHEN 325 MG/1
650 TABLET ORAL ONCE
Status: COMPLETED | OUTPATIENT
Start: 2024-01-15 | End: 2024-01-15

## 2024-01-15 RX ADMIN — ALUMINA, MAGNESIA, AND SIMETHICONE 15 ML: 2400; 2400; 240 SUSPENSION ORAL at 16:12

## 2024-01-15 RX ADMIN — ACETAMINOPHEN 650 MG: 325 TABLET ORAL at 17:21

## 2024-01-15 RX ADMIN — ONDANSETRON 4 MG: 4 TABLET, ORALLY DISINTEGRATING ORAL at 17:23

## 2024-01-15 ASSESSMENT — ENCOUNTER SYMPTOMS
VOMITING: 1
APPETITE CHANGE: 1
FEVER: 1
SHORTNESS OF BREATH: 1

## 2024-01-15 NOTE — PATIENT INSTRUCTIONS
Recommend waiting for the next 24 hours or so unless getting much worse related to the abdominal pain.    Go to the emergency room immediately with any new fevers, severe pain that does not go away quickly.     Consider coming back here for pain that does not start to improve in the next day or two.  We can do imaging tests if needed.    Can try Tylenol as needed for pain.  Warm packs.    So far, your workup is normal.  I have added on a urine culture, but no obvious bladder infection at this time.

## 2024-01-15 NOTE — PROGRESS NOTES
Assessment & Plan     Acute cough    - Influenza A & B Antigen - Clinic Collect  - Symptomatic COVID-19 Virus (Coronavirus) by PCR Nose    Missed menses    - HCG qualitative urine  - HCG qualitative urine    LUQ abdominal pain    - CBC with platelets and differential  - Comprehensive metabolic panel  - Lipase  - alum & mag hydroxide-simethicone (MAALOX  ES) suspension 15 mL  - UA Macroscopic with reflex to Microscopic and Culture - Clinic Collect  - CBC with platelets and differential  - Comprehensive metabolic panel  - Lipase  - UA Microscopic with Reflex to Culture  - acetaminophen (TYLENOL) tablet 650 mg  - ondansetron (ZOFRAN ODT) ODT tab 4 mg  - Urine Culture Aerobic Bacterial - lab collect  - Urine Culture Aerobic Bacterial - lab collect    Nausea      Viral URI with cough         Patient with with URI symptoms, presumed fevers starting on same time is persistent left upper quadrant abdominal pain.  Constant pain of 2-4 out of 10, not related to coughing, with worsening sharp pain at times.  Poor appetite.  Does have GERD.  Pepcid at home not helping.  Did do a trial dose of Maalox which was not helpful.  She did start vomiting after initial workup.    Initial workup is negative for influenza with normal CBC, CMP, lipase.      Patient felt symptomatically better with nausea following 1 dose of Zofran.  Declines additional doses.  No change in abdominal pain.    Symptoms could be secondary to overall cough illness such as muscle strain.  She does say it is better with changes in positions.    All in all, warm packs, rest.  Push fluids, diet as tolerated, increase gradually.  Cough suppressant.  Recheck at any time if pain not improving or go to emergency room with fevers, unrelenting severe pain.      25 minutes spent by me on the date of the encounter doing chart review, review of test results, patient visit, documentation, and rechecks          Return in about 1 day (around 1/16/2024).    Estelle Teresa,  [facility] VIVEK Jurado is a 33 year old female who presents to clinic today for the following health issues:  Chief Complaint   Patient presents with    Abdominal Pain     ULQ on/off discomfort has been constant per pt.X 4-5 days. No diarrhea or constipation     HPI    Cough, congestion  x 5 days.  Presumed.  Has likely had fevers.  Hasn't checked.  Had LUQ abd pain that started around the same time. Not related to coughing.  No urinary sx      Poor appetite.      Right now is pain is between 2-4/10.  Will be an 8 with lying down and will be better after changing position.  No urinary sx.      Used inhaler last night.  No asthma hx.  Has rec'd in the past for bronchitis. Didn't help.      Hx acid reflux. Pepcid not helping.      No periods x 3 months.  Trying to get pregnant.  Last UPT 1 month ago.     Not taking weight mgmt meds.              Review of Systems   Constitutional:  Positive for appetite change and fever.   HENT:  Positive for congestion.    Respiratory:  Positive for shortness of breath.    Cardiovascular:  Negative for chest pain.   Gastrointestinal:  Positive for vomiting (x 1 - not related to cough.).           Objective    /79   Pulse 69   Temp 97.6  F (36.4  C) (Oral)   Resp 20   Wt 95.6 kg (210 lb 11.2 oz)   LMP  (LMP Unknown)   SpO2 97%   BMI 37.62 kg/m    Physical Exam  Constitutional:       Appearance: Normal appearance.   Eyes:      Conjunctiva/sclera: Conjunctivae normal.   Abdominal:      General: There is no distension.      Palpations: Abdomen is soft.      Tenderness: There is abdominal tenderness (LUQ). There is guarding. There is no right CVA tenderness or left CVA tenderness.   Skin:     General: Skin is warm.   Neurological:      Mental Status: She is alert.   Psychiatric:         Mood and Affect: Mood normal.            Results for orders placed or performed in visit on 01/15/24 (from the past 24 hour(s))   Influenza A & B Antigen  - Clinic Collect    Specimen: Nose; Swab   Result Value Ref Range    Influenza A antigen Negative Negative    Influenza B antigen Negative Negative    Narrative    Test results must be correlated with clinical data. If necessary, results should be confirmed by a molecular assay or viral culture.   UA Macroscopic with reflex to Microscopic and Culture - Clinic Collect    Specimen: Urine, Clean Catch   Result Value Ref Range    Color Urine Yellow Colorless, Straw, Light Yellow, Yellow    Appearance Urine Slightly Cloudy (A) Clear    Glucose Urine Negative Negative mg/dL    Bilirubin Urine Small (A) Negative    Ketones Urine 40 (A) Negative mg/dL    Specific Gravity Urine 1.025 1.005 - 1.030    Blood Urine Negative Negative    pH Urine 6.5 5.0 - 8.0    Protein Albumin Urine Negative Negative mg/dL    Urobilinogen Urine 1.0 0.2, 1.0 E.U./dL    Nitrite Urine Negative Negative    Leukocyte Esterase Urine Negative Negative   HCG qualitative urine   Result Value Ref Range    hCG Urine Qualitative Negative Negative   UA Microscopic with Reflex to Culture   Result Value Ref Range    Bacteria Urine Moderate (A) None Seen /HPF    RBC Urine 0-2 0-2 /HPF /HPF    WBC Urine 0-5 0-5 /HPF /HPF    Squamous Epithelials Urine Many (A) None Seen /LPF    Mucus Urine Present (A) None Seen /LPF    Calcium Oxalate Crystals Urine Few (A) None Seen /HPF    Hyaline Casts Urine 0-2 (A) None Seen /LPF    Narrative    Urine Culture not indicated   CBC with platelets and differential    Narrative    The following orders were created for panel order CBC with platelets and differential.  Procedure                               Abnormality         Status                     ---------                               -----------         ------                     CBC with platelets and d...[[id number]]                      Final result                 Please view results for these tests on the individual orders.   Comprehensive metabolic panel   Result  Value Ref Range    Sodium 142 135 - 145 mmol/L    Potassium 4.2 3.4 - 5.3 mmol/L    Carbon Dioxide (CO2) 26 22 - 29 mmol/L    Anion Gap 12 7 - 15 mmol/L    Urea Nitrogen 11.4 6.0 - 20.0 mg/dL    Creatinine 0.68 0.51 - 0.95 mg/dL    GFR Estimate >90 >60 mL/min/1.73m2    Calcium 9.3 8.6 - 10.0 mg/dL    Chloride 104 98 - 107 mmol/L    Glucose 98 70 - 99 mg/dL    Alkaline Phosphatase 70 40 - 150 U/L    AST 50 (H) 0 - 45 U/L    ALT 67 (H) 0 - 50 U/L    Protein Total 7.6 6.4 - 8.3 g/dL    Albumin 4.4 3.5 - 5.2 g/dL    Bilirubin Total 0.7 <=1.2 mg/dL   Lipase   Result Value Ref Range    Lipase 31 13 - 60 U/L   CBC with platelets and differential   Result Value Ref Range    WBC Count 5.1 4.0 - 11.0 10e3/uL    RBC Count 5.11 3.80 - 5.20 10e6/uL    Hemoglobin 15.6 11.7 - 15.7 g/dL    Hematocrit 44.3 35.0 - 47.0 %    MCV 87 78 - 100 fL    MCH 30.5 26.5 - 33.0 pg    MCHC 35.2 31.5 - 36.5 g/dL    RDW 11.7 10.0 - 15.0 %    Platelet Count 268 150 - 450 10e3/uL    % Neutrophils 49 %    % Lymphocytes 41 %    % Monocytes 8 %    % Eosinophils 2 %    % Basophils 0 %    % Immature Granulocytes 0 %    Absolute Neutrophils 2.5 1.6 - 8.3 10e3/uL    Absolute Lymphocytes 2.1 0.8 - 5.3 10e3/uL    Absolute Monocytes 0.4 0.0 - 1.3 10e3/uL    Absolute Eosinophils 0.1 0.0 - 0.7 10e3/uL    Absolute Basophils 0.0 0.0 - 0.2 10e3/uL    Absolute Immature Granulocytes 0.0 <=0.4 10e3/uL

## 2024-01-16 LAB — SARS-COV-2 RNA RESP QL NAA+PROBE: NEGATIVE

## 2024-01-17 LAB — BACTERIA UR CULT: NORMAL

## 2024-02-10 ENCOUNTER — E-VISIT (OUTPATIENT)
Dept: FAMILY MEDICINE | Facility: CLINIC | Age: 34
End: 2024-02-10
Payer: COMMERCIAL

## 2024-02-10 DIAGNOSIS — K62.5 RECTAL BLEEDING: Primary | ICD-10-CM

## 2024-02-10 PROCEDURE — 99207 PR NO BILLABLE SERVICE THIS VISIT: CPT | Performed by: FAMILY MEDICINE

## 2024-02-12 NOTE — PATIENT INSTRUCTIONS
Thank you for choosing us for your care. Based on your symptoms and length of illness,and your history of similar problem in the past requiring banding of hemorrhoids, I will send over a referral for colorectal surgery to see you to see if you need the same treatment again.      If you prefer, I can take a look to confirm the hemorrhoid in clinic if you make an in person appointment.  You can schedule an appointment right here in [facility], or call [phone]  If the visit is for the same symptoms as your eVisit, we'll refund the cost of your eVisit if seen within seven days.

## 2024-02-20 ENCOUNTER — MYC MEDICAL ADVICE (OUTPATIENT)
Dept: FAMILY MEDICINE | Facility: CLINIC | Age: 34
End: 2024-02-20
Payer: COMMERCIAL

## 2024-02-20 NOTE — TELEPHONE ENCOUNTER
Diagnosis, Referred by & from: Hemorrhoids   Appt date: 4/24/2024   NOTES STATUS DETAILS   OFFICE NOTE from referring provider Internal [facility]:  2/10/24 - PCC OV with Dr. Vicente   OFFICE NOTE from other specialist Internal [facility]:  1/15/24 - PCC OV with Estelle Rushing NP   DISCHARGE SUMMARY from hospital N/A    DISCHARGE REPORT from the ER Internal [facility]:  5/22/18 - Admission with Dr. Beck   OPERATIVE REPORT N/A    MEDICATION LIST Internal    LABS N/A    DIAGNOSTIC PROCEDURES     COLONOSCOPY (most recent all time after 5 years) Received [facility]:  6/17/16 - Colonoscopy   IMAGING (DISC & REPORT)      ULTRASOUND  (ENDOANAL/ENDORECTAL) Internal [facility]:  2/13/19 - US Pelvic  5/22/18 - US Pelvic

## 2024-02-21 NOTE — TELEPHONE ENCOUNTER
Left message to call back for: Patient   Information to relay to patient: See provider message below and help patient schedule. OK to use reserved slots.

## 2024-04-04 ENCOUNTER — MYC MEDICAL ADVICE (OUTPATIENT)
Dept: SURGERY | Facility: CLINIC | Age: 34
End: 2024-04-04
Payer: COMMERCIAL

## 2024-04-09 ENCOUNTER — PATIENT OUTREACH (OUTPATIENT)
Dept: CARE COORDINATION | Facility: CLINIC | Age: 34
End: 2024-04-09
Payer: COMMERCIAL

## 2024-04-16 ASSESSMENT — PATIENT HEALTH QUESTIONNAIRE - PHQ9
10. IF YOU CHECKED OFF ANY PROBLEMS, HOW DIFFICULT HAVE THESE PROBLEMS MADE IT FOR YOU TO DO YOUR WORK, TAKE CARE OF THINGS AT HOME, OR GET ALONG WITH OTHER PEOPLE: NOT DIFFICULT AT ALL
SUM OF ALL RESPONSES TO PHQ QUESTIONS 1-9: 3
SUM OF ALL RESPONSES TO PHQ QUESTIONS 1-9: 3

## 2024-04-17 ENCOUNTER — OFFICE VISIT (OUTPATIENT)
Dept: FAMILY MEDICINE | Facility: CLINIC | Age: 34
End: 2024-04-17
Payer: COMMERCIAL

## 2024-04-17 VITALS
OXYGEN SATURATION: 97 % | BODY MASS INDEX: 35.42 KG/M2 | WEIGHT: 199.9 LBS | RESPIRATION RATE: 16 BRPM | DIASTOLIC BLOOD PRESSURE: 84 MMHG | HEART RATE: 79 BPM | SYSTOLIC BLOOD PRESSURE: 127 MMHG | TEMPERATURE: 98.9 F | HEIGHT: 63 IN

## 2024-04-17 DIAGNOSIS — E66.812 CLASS 2 SEVERE OBESITY WITH SERIOUS COMORBIDITY AND BODY MASS INDEX (BMI) OF 35.0 TO 35.9 IN ADULT, UNSPECIFIED OBESITY TYPE (H): Primary | ICD-10-CM

## 2024-04-17 DIAGNOSIS — F90.9 ATTENTION DEFICIT HYPERACTIVITY DISORDER (ADHD), UNSPECIFIED ADHD TYPE: ICD-10-CM

## 2024-04-17 DIAGNOSIS — G89.29 CHRONIC BILATERAL LOW BACK PAIN WITHOUT SCIATICA: ICD-10-CM

## 2024-04-17 DIAGNOSIS — M25.512 BILATERAL SHOULDER PAIN, UNSPECIFIED CHRONICITY: ICD-10-CM

## 2024-04-17 DIAGNOSIS — M54.50 CHRONIC BILATERAL LOW BACK PAIN WITHOUT SCIATICA: ICD-10-CM

## 2024-04-17 DIAGNOSIS — K64.4 EXTERNAL HEMORRHOIDS: ICD-10-CM

## 2024-04-17 DIAGNOSIS — F41.1 GENERALIZED ANXIETY DISORDER: ICD-10-CM

## 2024-04-17 DIAGNOSIS — F33.1 MAJOR DEPRESSIVE DISORDER, RECURRENT EPISODE, MODERATE (H): ICD-10-CM

## 2024-04-17 DIAGNOSIS — M25.511 BILATERAL SHOULDER PAIN, UNSPECIFIED CHRONICITY: ICD-10-CM

## 2024-04-17 DIAGNOSIS — E66.01 CLASS 2 SEVERE OBESITY WITH SERIOUS COMORBIDITY AND BODY MASS INDEX (BMI) OF 35.0 TO 35.9 IN ADULT, UNSPECIFIED OBESITY TYPE (H): Primary | ICD-10-CM

## 2024-04-17 PROBLEM — Z97.5 IUD (INTRAUTERINE DEVICE) IN PLACE: Status: RESOLVED | Noted: 2018-11-04 | Resolved: 2024-04-17

## 2024-04-17 PROCEDURE — 99213 OFFICE O/P EST LOW 20 MIN: CPT | Performed by: FAMILY MEDICINE

## 2024-04-17 NOTE — ASSESSMENT & PLAN NOTE
Weight continues to improve.  Current BMI is down to 35.  6/28/2023 A1c is 5.3 -Saxenda denied - message sent to consider contrave, nl lipid, nl tsh 6/29/23 she wants contrave so that is sent.  7/12/2023-patient says she has side effects from Contrave 1 pill in the morning so dc and added to allergies

## 2024-04-17 NOTE — ASSESSMENT & PLAN NOTE
Chronic bilateral low back pain, hx spinal fusion age 19 after recurrent jumping trauma from cheer leading. . In the past year pain is worsening. Spine center / physical therapy referral.

## 2024-04-17 NOTE — ASSESSMENT & PLAN NOTE
Chronic bilateral shoulder pain, currently left worse than right, suspect large breasts and posture.   Gets tingly in left arm with computer work.     Spine center / physical therapy referral. Breasts are very large, she is interested in breast reduction some day. Also wants to try to lose weight.

## 2024-04-17 NOTE — PROGRESS NOTES
"  Assessment & Plan   Problem List Items Addressed This Visit          Nervous and Auditory    Bilateral shoulder pain, unspecified chronicity     Chronic bilateral shoulder pain, currently left worse than right, suspect large breasts and posture.   Gets tingly in left arm with computer work.     Spine center / physical therapy referral. Breasts are very large, she is interested in breast reduction some day. Also wants to try to lose weight.          Chronic bilateral low back pain without sciatica     Chronic bilateral low back pain, hx spinal fusion age 19 after recurrent jumping trauma from cheer leading. . In the past year pain is worsening. Spine center / physical therapy referral.         Relevant Orders    Spine  Referral       Digestive    Class 2 severe obesity with serious comorbidity and body mass index (BMI) of 35.0 to 35.9 in adult (H) - Primary     Weight continues to improve.  Current BMI is down to 35.  6/28/2023 A1c is 5.3 -Saxenda denied - message sent to consider contrave, nl lipid, nl tsh 6/29/23 she wants contrave so that is sent.  7/12/2023-patient says she has side effects from Contrave 1 pill in the morning so dc and added to allergies         RESOLVED: External hemorrhoids     Managed by colorectal in [place], improved with lifestyle.             Behavioral    Attention deficit hyperactivity disorder (ADHD), unspecified ADHD type     Sees talk therapist gary fitzgerald.          Generalized anxiety disorder     Depression and anxiety - sees therapist gary fitzgerald which really helps.          RESOLVED: Major depressive disorder, recurrent episode, moderate (H)     Depression - not currently on any mood meds phq 9 is 3 controlled with lifestyle.                    BMI  Estimated body mass index is 35.69 kg/m  as calculated from the following:    Height as of this encounter: 1.594 m (5' 2.75\").    Weight as of this encounter: 90.7 kg (199 lb 14.4 oz).         Tru Jurado is a 33 year " "old, presenting for the following health issues:  Follow Up (Hemorrhoid went away, and Hemoglobin level check), Imm/Inj (Declined all vaccines today), and Back Pain (Discuss chronic back pain, had back surgery when she was 19 yrs old. )  Chronic pain in low back since age 19 (recurrent trauma to spine from jumping in cheerleading), needed lumbar fusion. Pain manageable, but worsening in the past 1 year.       4/17/2024    10:08 AM   Additional Questions   Roomed by Nic Mcelroy MA   Accompanied by Self         4/17/2024    10:08 AM   Patient Reported Additional Medications   Patient reports taking the following new medications None     History of Present Illness       Back Pain:  She presents for follow up of back pain. Patient's back pain is a chronic problem.  Location of back pain:  Right lower back, left lower back, right shoulder and left shoulder  Description of back pain: dull ache and sharp  Back pain spreads: left buttocks    Since patient first noticed back pain, pain is: always present, but gets better and worse  Does back pain interfere with her job:  Yes       Reason for visit:  Hemorrhoids and back pain    She eats 0-1 servings of fruits and vegetables daily.She consumes 1 sweetened beverage(s) daily.She exercises with enough effort to increase her heart rate 20 to 29 minutes per day.  She exercises with enough effort to increase her heart rate 3 or less days per week.   She is taking medications regularly.           Objective    /84 (BP Location: Left arm, Patient Position: Sitting, Cuff Size: Adult Large)   Pulse 79   Temp 98.9  F (37.2  C) (Oral)   Resp 16   Ht 1.594 m (5' 2.75\")   Wt 90.7 kg (199 lb 14.4 oz)   LMP 03/26/2024   SpO2 97%   BMI 35.69 kg/m    Body mass index is 35.69 kg/m .  Physical Exam  Constitutional:       Appearance: Normal appearance.   HENT:      Head: Normocephalic and atraumatic.   Cardiovascular:      Rate and Rhythm: Normal rate and regular rhythm.   Pulmonary: "      Effort: Pulmonary effort is normal.   Musculoskeletal:         General: Normal range of motion.      Cervical back: Normal range of motion and neck supple.   Neurological:      General: No focal deficit present.      Mental Status: She is alert and oriented to person, place, and time.             Signed Electronically by: Paola Vicente MD

## 2024-04-24 ENCOUNTER — PRE VISIT (OUTPATIENT)
Dept: SURGERY | Facility: CLINIC | Age: 34
End: 2024-04-24

## 2024-07-14 ENCOUNTER — HEALTH MAINTENANCE LETTER (OUTPATIENT)
Age: 34
End: 2024-07-14

## 2024-09-23 ENCOUNTER — OFFICE VISIT (OUTPATIENT)
Dept: FAMILY MEDICINE | Facility: CLINIC | Age: 34
End: 2024-09-23
Payer: COMMERCIAL

## 2024-09-23 VITALS
RESPIRATION RATE: 20 BRPM | TEMPERATURE: 98.2 F | WEIGHT: 200 LBS | HEART RATE: 82 BPM | OXYGEN SATURATION: 97 % | DIASTOLIC BLOOD PRESSURE: 76 MMHG | BODY MASS INDEX: 35.44 KG/M2 | SYSTOLIC BLOOD PRESSURE: 116 MMHG | HEIGHT: 63 IN

## 2024-09-23 DIAGNOSIS — M54.50 CHRONIC BILATERAL LOW BACK PAIN WITHOUT SCIATICA: ICD-10-CM

## 2024-09-23 DIAGNOSIS — Z00.00 ROUTINE GENERAL MEDICAL EXAMINATION AT A HEALTH CARE FACILITY: Primary | ICD-10-CM

## 2024-09-23 DIAGNOSIS — Z13.220 SCREENING FOR HYPERLIPIDEMIA: ICD-10-CM

## 2024-09-23 DIAGNOSIS — R74.8 ELEVATED LIVER ENZYMES: ICD-10-CM

## 2024-09-23 DIAGNOSIS — Z23 NEEDS FLU SHOT: ICD-10-CM

## 2024-09-23 DIAGNOSIS — M25.511 BILATERAL SHOULDER PAIN, UNSPECIFIED CHRONICITY: ICD-10-CM

## 2024-09-23 DIAGNOSIS — E55.9 VITAMIN D DEFICIENCY: ICD-10-CM

## 2024-09-23 DIAGNOSIS — N62 LARGE BREASTS: ICD-10-CM

## 2024-09-23 DIAGNOSIS — N97.9 FEMALE INFERTILITY: ICD-10-CM

## 2024-09-23 DIAGNOSIS — E28.2 PCOS (POLYCYSTIC OVARIAN SYNDROME): ICD-10-CM

## 2024-09-23 DIAGNOSIS — M25.512 BILATERAL SHOULDER PAIN, UNSPECIFIED CHRONICITY: ICD-10-CM

## 2024-09-23 DIAGNOSIS — Z13.1 SCREENING FOR DIABETES MELLITUS: ICD-10-CM

## 2024-09-23 DIAGNOSIS — Z23 NEED FOR HEPATITIS B VACCINATION: ICD-10-CM

## 2024-09-23 DIAGNOSIS — G89.29 CHRONIC BILATERAL LOW BACK PAIN WITHOUT SCIATICA: ICD-10-CM

## 2024-09-23 LAB
ALBUMIN SERPL BCG-MCNC: 4.3 G/DL (ref 3.5–5.2)
ALP SERPL-CCNC: 75 U/L (ref 40–150)
ALT SERPL W P-5'-P-CCNC: 37 U/L (ref 0–50)
ANION GAP SERPL CALCULATED.3IONS-SCNC: 9 MMOL/L (ref 7–15)
AST SERPL W P-5'-P-CCNC: 27 U/L (ref 0–45)
BILIRUB SERPL-MCNC: 0.4 MG/DL
BUN SERPL-MCNC: 10.4 MG/DL (ref 6–20)
CALCIUM SERPL-MCNC: 9.1 MG/DL (ref 8.8–10.4)
CHLORIDE SERPL-SCNC: 105 MMOL/L (ref 98–107)
CHOLEST SERPL-MCNC: 161 MG/DL
CREAT SERPL-MCNC: 0.68 MG/DL (ref 0.51–0.95)
EGFRCR SERPLBLD CKD-EPI 2021: >90 ML/MIN/1.73M2
EST. AVERAGE GLUCOSE BLD GHB EST-MCNC: 103 MG/DL
FASTING STATUS PATIENT QL REPORTED: NO
FASTING STATUS PATIENT QL REPORTED: NO
GLUCOSE SERPL-MCNC: 94 MG/DL (ref 70–99)
HBA1C MFR BLD: 5.2 % (ref 0–5.6)
HCO3 SERPL-SCNC: 24 MMOL/L (ref 22–29)
HDLC SERPL-MCNC: 51 MG/DL
LDLC SERPL CALC-MCNC: 87 MG/DL
NONHDLC SERPL-MCNC: 110 MG/DL
POTASSIUM SERPL-SCNC: 4.2 MMOL/L (ref 3.4–5.3)
PROT SERPL-MCNC: 7.1 G/DL (ref 6.4–8.3)
SODIUM SERPL-SCNC: 138 MMOL/L (ref 135–145)
TRIGL SERPL-MCNC: 116 MG/DL
VIT D+METAB SERPL-MCNC: 21 NG/ML (ref 20–50)

## 2024-09-23 PROCEDURE — 90472 IMMUNIZATION ADMIN EACH ADD: CPT | Performed by: STUDENT IN AN ORGANIZED HEALTH CARE EDUCATION/TRAINING PROGRAM

## 2024-09-23 PROCEDURE — 82306 VITAMIN D 25 HYDROXY: CPT | Performed by: STUDENT IN AN ORGANIZED HEALTH CARE EDUCATION/TRAINING PROGRAM

## 2024-09-23 PROCEDURE — 99214 OFFICE O/P EST MOD 30 MIN: CPT | Mod: 25 | Performed by: STUDENT IN AN ORGANIZED HEALTH CARE EDUCATION/TRAINING PROGRAM

## 2024-09-23 PROCEDURE — 90746 HEPB VACCINE 3 DOSE ADULT IM: CPT | Performed by: STUDENT IN AN ORGANIZED HEALTH CARE EDUCATION/TRAINING PROGRAM

## 2024-09-23 PROCEDURE — 90656 IIV3 VACC NO PRSV 0.5 ML IM: CPT | Performed by: STUDENT IN AN ORGANIZED HEALTH CARE EDUCATION/TRAINING PROGRAM

## 2024-09-23 PROCEDURE — 80061 LIPID PANEL: CPT | Performed by: STUDENT IN AN ORGANIZED HEALTH CARE EDUCATION/TRAINING PROGRAM

## 2024-09-23 PROCEDURE — 90471 IMMUNIZATION ADMIN: CPT | Performed by: STUDENT IN AN ORGANIZED HEALTH CARE EDUCATION/TRAINING PROGRAM

## 2024-09-23 PROCEDURE — 99395 PREV VISIT EST AGE 18-39: CPT | Mod: 25 | Performed by: STUDENT IN AN ORGANIZED HEALTH CARE EDUCATION/TRAINING PROGRAM

## 2024-09-23 PROCEDURE — 36415 COLL VENOUS BLD VENIPUNCTURE: CPT | Performed by: STUDENT IN AN ORGANIZED HEALTH CARE EDUCATION/TRAINING PROGRAM

## 2024-09-23 PROCEDURE — 83036 HEMOGLOBIN GLYCOSYLATED A1C: CPT | Performed by: STUDENT IN AN ORGANIZED HEALTH CARE EDUCATION/TRAINING PROGRAM

## 2024-09-23 PROCEDURE — 80053 COMPREHEN METABOLIC PANEL: CPT | Performed by: STUDENT IN AN ORGANIZED HEALTH CARE EDUCATION/TRAINING PROGRAM

## 2024-09-23 ASSESSMENT — PATIENT HEALTH QUESTIONNAIRE - PHQ9: SUM OF ALL RESPONSES TO PHQ QUESTIONS 1-9: 4

## 2024-09-23 NOTE — PROGRESS NOTES
Preventive Care Visit  [facility]  Angely Muñiz MD, Family Medicine  Sep 23, 2024      Adrien was seen today for physical, shoulder pain, breast pain and discuss fertility.    Diagnoses and all orders for this visit:    Routine general medical examination at a health care facility  -     PRIMARY CARE FOLLOW-UP SCHEDULING; Future    Elevated liver enzymes  Per chart review.  Recheck today.  If still elevated, will need to repeat fasting.  Patient is agreeable.  -     Comprehensive metabolic panel (BMP + Alb, Alk Phos, ALT, AST, Total. Bili, TP); Future    PCOS (polycystic ovarian syndrome)  Female infertility  History of irregular periods and PCOS, has been trying to get a pregnant for the last 3 years without success.  Prior workup includes: Pelvic ultrasound 5/22/2018 showing bilateral ovaries with multiple follicles, otherwise unremarkable.  Free testosterone elevated and prolactin level WNL 6/26/2018.  TSH WNL 6/6/2019.  Refer to fertility.  -     Ob/Gyn  Referral; Future    Need for hepatitis B vaccination  -     HEPATITIS B, ADULT 20+ (ENGERIX-B/RECOMBIVAX HB)    Needs flu shot  -     INFLUENZA VACCINE,SPLIT VIRUS,TRIVALENT,PF(FLUZONE)    Screening for hyperlipidemia  -     Lipid Profile (Chol, Trig, HDL, LDL calc); Future    Screening for diabetes mellitus  -     Hemoglobin A1c; Future    Large breasts  Patient believes large breasts have been causing back pain and shoulder pain.  Would like reduction.  Referred to plastic surgery.  -     Adult Plastic Surgery  Referral; Future    Bilateral shoulder pain, unspecified chronicity  Chronic bilateral low back pain without sciatica  Chronic.  History of spinal fusion at 19.  Has tried PT in the past with no relief.  Was referred to spine by PCP.  Has not made appointment.  -Will make appointment with spine    Vitamin D deficiency  Noted per chart review, recheck today.  -     Vitamin D Deficiency; Future          Subjective    Adrien is a 33 year old, presenting for the following:  Physical, Shoulder Pain (Pain radiating to lower back.), Breast Pain, and Discuss Fertility        9/23/2024     1:11 PM   Additional Questions   Roomed by Sharron LEBLANC   Accompanied by self        Health Care Directive  Patient does not have a Health Care Directive or Living Will: Discussed advance care planning with patient; however, patient declined at this time.    HPI    Note from PCP 4/17/2024 reviewed:    - will be making Spine appt    -Breast  Interested in breast reduction    Fertility   PCOS  US pelvis 5/2018  IMPRESSION:  CONCLUSION:  1.  Prominent sized bilateral ovaries with multiple follicles. Polycystic ovarian syndrome is a possible differential.   2.  Otherwise, unremarkable exam.     - always had irregular periods  Work up in 2017 and 2018 and was normal   IUD taken out 4 years ago - Mirena  Trying 3 years now  Periods are every 2 weeks to once a month. Does go up to a 2 months without a period.             9/22/2024   General Health   How would you rate your overall physical health? Good   Feel stress (tense, anxious, or unable to sleep) To some extent      (!) STRESS CONCERN      9/22/2024   Nutrition   Three or more servings of calcium each day? (!) NO   Diet: Regular (no restrictions)   How many servings of fruit and vegetables per day? (!) 2-3   How many sweetened beverages each day? 0-1            9/22/2024   Exercise   Days per week of moderate/strenous exercise 0 days   Average minutes spent exercising at this level 0 min      (!) EXERCISE CONCERN      9/22/2024   Social Factors   Frequency of gathering with friends or relatives Twice a week   Worry food won't last until get money to buy more No   Food not last or not have enough money for food? No   Do you have housing? (Housing is defined as stable permanent housing and does not include staying ouside in a car, in a tent, in an abandoned building, in an overnight shelter, or couch-surfing.)  "No   Are you worried about losing your housing? No   Lack of transportation? No   Unable to get utilities (heat,electricity)? No   Want help with housing or utility concern? No      (!) HOUSING CONCERN PRESENT      9/22/2024   Dental   Dentist two times every year? (!) NO            9/22/2024   TB Screening   Were you born outside of the [place]? No          Today's PHQ-9 Score:       9/23/2024     1:17 PM   PHQ-9 SCORE   PHQ-9 Total Score 4         9/22/2024   Substance Use   Alcohol more than 3/day or more than 7/wk No   Do you use any other substances recreationally? (!) CANNABIS PRODUCTS        Social History     Tobacco Use    Smoking status: Never     Passive exposure: Never    Smokeless tobacco: Never   Vaping Use    Vaping status: Never Used   Substance Use Topics    Alcohol use: No     Alcohol/week: 0.0 - 0.8 standard drinks of alcohol    Drug use: No           9/22/2024   STI Screening   New sexual partner(s) since last STI/HIV test? No        History of abnormal Pap smear: No - age 30- 64 PAP with HPV every 5 years recommended        Latest Ref Rng & Units 5/9/2023     3:27 PM 5/29/2018     3:23 PM   PAP / HPV   PAP  Negative for Intraepithelial Lesion or Malignancy (NILM)  Negative for squamous intraepithelial lesion or malignancy  Electronically signed by Josey Layne CT (ASCP) on 5/30/2018 at  3:33 PM      HPV 16 DNA Negative Negative     HPV 18 DNA Negative Negative     Other HR HPV Negative Negative             9/22/2024   Contraception/Family Planning   Questions about contraception or family planning (!) YES           Reviewed and updated as needed this visit by Provider                             Objective    Exam  /76   Pulse 82   Temp 98.2  F (36.8  C) (Oral)   Resp 20   Ht 1.6 m (5' 3\")   Wt 90.7 kg (200 lb)   LMP 08/19/2024 (Exact Date)   SpO2 97%   BMI 35.43 kg/m     Estimated body mass index is 35.43 kg/m  as calculated from the following:    Height as of this encounter: 1.6 " "m (5' 3\").    Weight as of this encounter: 90.7 kg (200 lb).    Physical Exam  General Appearance:  Alert, cooperative, no distress, appears stated age.  Head:  Normocephalic, without obvious abnormality, atraumatic.  Eyes:  Conjunctivae/corneas clear, extraocular movements intact both eyes.  Lungs:  Clear to auscultation bilaterally, respirations unlabored.  Heart:  Regular rate and rhythm, S1 and S2 normal, no murmur, rub or gallop.  Abdomen:  Soft, non-tender, bowel sounds active all four quadrants.   Extremities:  Atraumatic, no cyanosis or edema.  Skin:  Skin color, texture, turgor normal, no rashes or lesions.  Neurologic: No focal deficits.          Signed Electronically by: Angely Muñiz MD      Prior to immunization administration, verified patients identity using patient s name and date of birth. Please see Immunization Activity for additional information.     Screening Questionnaire for Adult Immunization    Are you sick today?   No   Do you have allergies to medications, food, a vaccine component or latex?   Yes   Have you ever had a serious reaction after receiving a vaccination?   Yes   Do you have a long-term health problem with heart, lung, kidney, or metabolic disease (e.g., diabetes), asthma, a blood disorder, no spleen, complement component deficiency, a cochlear implant, or a spinal fluid leak?  Are you on long-term aspirin therapy?   No   Do you have cancer, leukemia, HIV/AIDS, or any other immune system problem?   No   Do you have a parent, brother, or sister with an immune system problem?   No   In the past 3 months, have you taken medications that affect  your immune system, such as prednisone, other steroids, or anticancer drugs; drugs for the treatment of rheumatoid arthritis, Crohn s disease, or psoriasis; or have you had radiation treatments?   No   Have you had a seizure, or a brain or other nervous system problem?   No   During the past year, have you received a transfusion of blood or " blood    products, or been given immune (gamma) globulin or antiviral drug?   No   For women: Are you pregnant or is there a chance you could become       pregnant during the next month?   Yes   Have you received any vaccinations in the past 4 weeks?   No     Immunization questionnaire was positive for at least one answer.  Notified provider.      Patient instructed to remain in clinic for 15 minutes afterwards, and to report any adverse reactions.     Screening performed by Sharron Harper MA on 9/23/2024 at 1:18 PM.

## 2024-09-23 NOTE — PATIENT INSTRUCTIONS
Patient Education   Preventive Care Advice   This is general advice given by our system to help you stay healthy. However, your care team may have specific advice just for you. Please talk to your care team about your preventive care needs.  Nutrition  Eat 5 or more servings of fruits and vegetables each day.  Try wheat bread, brown rice and whole grain pasta (instead of white bread, rice, and pasta).  Get enough calcium and vitamin D. Check the label on foods and aim for 100% of the RDA (recommended daily allowance).  Lifestyle  Exercise at least 150 minutes each week  (30 minutes a day, 5 days a week).  Do muscle strengthening activities 2 days a week. These help control your weight and prevent disease.  No smoking.  Wear sunscreen to prevent skin cancer.  Have a dental exam and cleaning every 6 months.  Yearly exams  See your health care team every year to talk about:  Any changes in your health.  Any medicines your care team has prescribed.  Preventive care, family planning, and ways to prevent chronic diseases.  Shots (vaccines)   HPV shots (up to age 26), if you've never had them before.  Hepatitis B shots (up to age 59), if you've never had them before.  COVID-19 shot: Get this shot when it's due.  Flu shot: Get a flu shot every year.  Tetanus shot: Get a tetanus shot every 10 years.  Pneumococcal, hepatitis A, and RSV shots: Ask your care team if you need these based on your risk.  Shingles shot (for age 50 and up)  General health tests  Diabetes screening:  Starting at age 35, Get screened for diabetes at least every 3 years.  If you are younger than age 35, ask your care team if you should be screened for diabetes.  Cholesterol test: At age 39, start having a cholesterol test every 5 years, or more often if advised.  Bone density scan (DEXA): At age 50, ask your care team if you should have this scan for osteoporosis (brittle bones).  Hepatitis C: Get tested at least once in your life.  STIs (sexually  transmitted infections)  Before age 24: Ask your care team if you should be screened for STIs.  After age 24: Get screened for STIs if you're at risk. You are at risk for STIs (including HIV) if:  You are sexually active with more than one person.  You don't use condoms every time.  You or a partner was diagnosed with a sexually transmitted infection.  If you are at risk for HIV, ask about PrEP medicine to prevent HIV.  Get tested for HIV at least once in your life, whether you are at risk for HIV or not.  Cancer screening tests  Cervical cancer screening: If you have a cervix, begin getting regular cervical cancer screening tests starting at age 21.  Breast cancer scan (mammogram): If you've ever had breasts, begin having regular mammograms starting at age 40. This is a scan to check for breast cancer.  Colon cancer screening: It is important to start screening for colon cancer at age 45.  Have a colonoscopy test every 10 years (or more often if you're at risk) Or, ask your provider about stool tests like a FIT test every year or Cologuard test every 3 years.  To learn more about your testing options, visit:   .  For help making a decision, visit:   https://bit.ly/es99008.  Prostate cancer screening test: If you have a prostate, ask your care team if a prostate cancer screening test (PSA) at age 55 is right for you.  Lung cancer screening: If you are a current or former smoker ages 50 to 80, ask your care team if ongoing lung cancer screenings are right for you.  For informational purposes only. Not to replace the advice of your health care provider. Copyright   2023 [facility]. All rights reserved. Clinically reviewed by the [facility] Transitions Program. mySchoolNotebook 730475 - REV 01/24.  Substance Use Disorder: Care Instructions  Overview     You can improve your life and health by stopping your use of alcohol or drugs. When you don't drink or use drugs, you may feel and sleep better. You may  get along better with your family, friends, and coworkers. There are medicines and programs that can help with substance use disorder.  How can you care for yourself at home?  Here are some ways to help you stay sober and prevent relapse.  If you have been given medicine to help keep you sober or reduce your cravings, be sure to take it exactly as prescribed.  Talk to your doctor about programs that can help you stop using drugs or drinking alcohol.  Do not keep alcohol or drugs in your home.  Plan ahead. Think about what you'll say if other people ask you to drink or use drugs. Try not to spend time with people who drink or use drugs.  Use the time and money spent on drinking or drugs to do something that's important to you.  Preventing a relapse  Have a plan to deal with relapse. Learn to recognize changes in your thinking that lead you to drink or use drugs. Get help before you start to drink or use drugs again.  Try to stay away from situations, friends, or places that may lead you to drink or use drugs.  If you feel the need to drink alcohol or use drugs again, seek help right away. Call a trusted friend or family member. Some people get support from organizations such as Narcotics Anonymous or [employer] or from treatment facilities.  If you relapse, get help as soon as you can. Some people make a plan with another person that outlines what they want that person to do for them if they relapse. The plan usually includes how to handle the relapse and who to notify in case of relapse.  Don't give up. Remember that a relapse doesn't mean that you have failed. Use the experience to learn the triggers that lead you to drink or use drugs. Then quit again. Recovery is a lifelong process. Many people have several relapses before they are able to quit for good.  Follow-up care is a key part of your treatment and safety. Be sure to make and go to all appointments, and call your doctor if you are having problems. It's  "also a good idea to know your test results and keep a list of the medicines you take.  When should you call for help?   Call 911  anytime you think you may need emergency care. For example, call if you or someone else:    Has overdosed or has withdrawal signs. Be sure to tell the emergency workers that you are or someone else is using or trying to quit using drugs. Overdose or withdrawal signs may include:  Losing consciousness.  Seizure.  Seeing or hearing things that aren't there (hallucinations).     Is thinking or talking about suicide or harming others.   Where to get help 24 hours a day, 7 days a week   If you or someone you know talks about suicide, self-harm, a mental health crisis, a substance use crisis, or any other kind of emotional distress, get help right away. You can:    Call the Suicide and Crisis Lifeline at 988.     Call 4-313-548-TALK (1-527.569.1879).     Text HOME to 085664 to access the Crisis Text Line.   Consider saving these numbers in your phone.  Go to [employer] for more information or to chat online.  Call your doctor now or seek immediate medical care if:    You are having withdrawal symptoms. These may include nausea or vomiting, sweating, shakiness, and anxiety.   Watch closely for changes in your health, and be sure to contact your doctor if:    You have a relapse.     You need more help or support to stop.   Where can you learn more?  Go to https://www.Create.net/patiented  Enter H573 in the search box to learn more about \"Substance Use Disorder: Care Instructions.\"  Current as of: November 15, 2023               Content Version: 14.0    3055-6658 [employer].   Care instructions adapted under license by your healthcare professional. If you have questions about a medical condition or this instruction, always ask your healthcare professional. [employer] disclaims any warranty or liability for your use of this information.         "

## 2024-09-23 NOTE — PROGRESS NOTES
Note from PCP 4/17/2024 reviewed:  Chronic bilateral shoulder pain  Chronic bilateral shoulder pain, currently left worse than right, suspect large breasts and posture.   Gets tingly in left arm with computer work.   Spine center / physical therapy referral. Breasts are very large, she is interested in breast reduction some day. Also wants to try to lose weight.   Bilateral low back pain without sciatica  Chronic bilateral low back pain, hx spinal fusion age 19 after recurrent jumping trauma from cheer leading. . In the past year pain is worsening. Spine center / physical therapy referral.  Class II obesity  Weight continues to improve.  Current BMI is down to 35.  6/28/2023 A1c is 5.3 -Saxenda denied - message sent to consider contrave, nl lipid, nl tsh 6/29/23 she wants contrave so that is sent.  7/12/2023-patient says she has side effects from Contrave 1 pill in the morning so dc and added to allergies    - ***

## 2024-10-02 ENCOUNTER — MYC MEDICAL ADVICE (OUTPATIENT)
Dept: FAMILY MEDICINE | Facility: CLINIC | Age: 34
End: 2024-10-02
Payer: COMMERCIAL

## 2024-10-04 NOTE — TELEPHONE ENCOUNTER
Writer replied to patient via [employer].     Silver CERDA RN  [facility]

## 2024-10-23 ENCOUNTER — HOSPITAL ENCOUNTER (OUTPATIENT)
Dept: ULTRASOUND IMAGING | Facility: HOSPITAL | Age: 34
Discharge: HOME | End: 2024-10-23
Attending: ADVANCED PRACTICE MIDWIFE | Admitting: ADVANCED PRACTICE MIDWIFE
Payer: COMMERCIAL

## 2024-10-23 ENCOUNTER — DOCUMENTATION ONLY (OUTPATIENT)
Dept: MIDWIFE SERVICES | Facility: CLINIC | Age: 34
End: 2024-10-23

## 2024-10-23 ENCOUNTER — PRENATAL OFFICE VISIT (OUTPATIENT)
Dept: MIDWIFE SERVICES | Facility: CLINIC | Age: 34
End: 2024-10-23
Payer: COMMERCIAL

## 2024-10-23 VITALS
BODY MASS INDEX: 35.79 KG/M2 | DIASTOLIC BLOOD PRESSURE: 68 MMHG | HEART RATE: 85 BPM | WEIGHT: 202 LBS | HEIGHT: 63 IN | SYSTOLIC BLOOD PRESSURE: 104 MMHG | OXYGEN SATURATION: 97 %

## 2024-10-23 DIAGNOSIS — O09.91 SUPERVISION OF HIGH RISK PREGNANCY, ANTEPARTUM, FIRST TRIMESTER: Primary | ICD-10-CM

## 2024-10-23 DIAGNOSIS — M54.9 BACK PAIN WITH HISTORY OF SPINAL SURGERY: ICD-10-CM

## 2024-10-23 DIAGNOSIS — Z98.890 BACK PAIN WITH HISTORY OF SPINAL SURGERY: ICD-10-CM

## 2024-10-23 DIAGNOSIS — O09.91 SUPERVISION OF HIGH RISK PREGNANCY, ANTEPARTUM, FIRST TRIMESTER: ICD-10-CM

## 2024-10-23 LAB
ABO/RH(D): NORMAL
ANTIBODY SCREEN: NEGATIVE
C TRACH DNA SPEC QL NAA+PROBE: NEGATIVE
ERYTHROCYTE [DISTWIDTH] IN BLOOD BY AUTOMATED COUNT: 13.1 % (ref 10–15)
HBV SURFACE AG SERPL QL IA: NONREACTIVE
HCT VFR BLD AUTO: 43.1 % (ref 35–47)
HCV AB SERPL QL IA: NONREACTIVE
HGB BLD-MCNC: 14.6 G/DL (ref 11.7–15.7)
HIV 1+2 AB+HIV1 P24 AG SERPL QL IA: NONREACTIVE
MCH RBC QN AUTO: 30 PG (ref 26.5–33)
MCHC RBC AUTO-ENTMCNC: 33.9 G/DL (ref 31.5–36.5)
MCV RBC AUTO: 89 FL (ref 78–100)
N GONORRHOEA DNA SPEC QL NAA+PROBE: NEGATIVE
PLATELET # BLD AUTO: 306 10E3/UL (ref 150–450)
RBC # BLD AUTO: 4.86 10E6/UL (ref 3.8–5.2)
SPECIMEN EXPIRATION DATE: NORMAL
SPECIMEN EXPIRATION DATE: NORMAL
T PALLIDUM AB SER QL: NONREACTIVE
WBC # BLD AUTO: 10.3 10E3/UL (ref 4–11)

## 2024-10-23 PROCEDURE — 86803 HEPATITIS C AB TEST: CPT

## 2024-10-23 PROCEDURE — 87340 HEPATITIS B SURFACE AG IA: CPT

## 2024-10-23 PROCEDURE — 87086 URINE CULTURE/COLONY COUNT: CPT

## 2024-10-23 PROCEDURE — 99459 PELVIC EXAMINATION: CPT | Performed by: ADVANCED PRACTICE MIDWIFE

## 2024-10-23 PROCEDURE — 86780 TREPONEMA PALLIDUM: CPT

## 2024-10-23 PROCEDURE — 86900 BLOOD TYPING SEROLOGIC ABO: CPT

## 2024-10-23 PROCEDURE — 76801 OB US < 14 WKS SINGLE FETUS: CPT

## 2024-10-23 PROCEDURE — 87491 CHLMYD TRACH DNA AMP PROBE: CPT | Performed by: ADVANCED PRACTICE MIDWIFE

## 2024-10-23 PROCEDURE — G2211 COMPLEX E/M VISIT ADD ON: HCPCS | Performed by: ADVANCED PRACTICE MIDWIFE

## 2024-10-23 PROCEDURE — 85027 COMPLETE CBC AUTOMATED: CPT

## 2024-10-23 PROCEDURE — 87389 HIV-1 AG W/HIV-1&-2 AB AG IA: CPT

## 2024-10-23 PROCEDURE — 87591 N.GONORRHOEAE DNA AMP PROB: CPT | Performed by: ADVANCED PRACTICE MIDWIFE

## 2024-10-23 PROCEDURE — 99203 OFFICE O/P NEW LOW 30 MIN: CPT | Performed by: ADVANCED PRACTICE MIDWIFE

## 2024-10-23 PROCEDURE — 36415 COLL VENOUS BLD VENIPUNCTURE: CPT

## 2024-10-23 PROCEDURE — 86762 RUBELLA ANTIBODY: CPT

## 2024-10-23 PROCEDURE — 86850 RBC ANTIBODY SCREEN: CPT

## 2024-10-23 RX ORDER — SENNA AND DOCUSATE SODIUM 50; 8.6 MG/1; MG/1
1 TABLET, FILM COATED ORAL AT BEDTIME
Qty: 90 TABLET | Refills: 2 | Status: SHIPPED | OUTPATIENT
Start: 2024-10-23

## 2024-10-23 RX ORDER — CALCIUM CARBONATE 500 MG/1
1 TABLET, CHEWABLE ORAL 2 TIMES DAILY
Qty: 90 TABLET | Refills: 1 | Status: SHIPPED | OUTPATIENT
Start: 2024-10-23

## 2024-10-23 RX ORDER — ASPIRIN 81 MG/1
81 TABLET, CHEWABLE ORAL DAILY
Qty: 90 TABLET | Refills: 3 | Status: SHIPPED | OUTPATIENT
Start: 2024-10-23

## 2024-10-23 ASSESSMENT — EDINBURGH POSTNATAL DEPRESSION SCALE (EPDS)
I HAVE BEEN SO UNHAPPY THAT I HAVE BEEN CRYING: NO, NEVER
I HAVE BLAMED MYSELF UNNECESSARILY WHEN THINGS WENT WRONG: NOT VERY OFTEN
I HAVE FELT SCARED OR PANICKY FOR NO GOOD REASON: NO, NOT AT ALL
I HAVE BEEN ANXIOUS OR WORRIED FOR NO GOOD REASON: YES, SOMETIMES
I HAVE BEEN SO UNHAPPY THAT I HAVE HAD DIFFICULTY SLEEPING: NOT AT ALL
I HAVE FELT SAD OR MISERABLE: NOT VERY OFTEN
THE THOUGHT OF HARMING MYSELF HAS OCCURRED TO ME: NEVER
I HAVE BEEN ABLE TO LAUGH AND SEE THE FUNNY SIDE OF THINGS: AS MUCH AS I ALWAYS COULD
I HAVE LOOKED FORWARD WITH ENJOYMENT TO THINGS: AS MUCH AS I EVER DID
TOTAL SCORE: 4
THINGS HAVE BEEN GETTING ON TOP OF ME: NO, I HAVE BEEN COPING AS WELL AS EVER

## 2024-10-23 NOTE — PROGRESS NOTES
"PRENATAL VISIT   FIRST OBSTETRICAL EXAM - OB     Assessment / Impression   First prenatal visit at 10 weeks 1 day gestation  33 year old    History of infertility, attempting pregnancy for 3 years  History of back pain and surgery  Constipation-prescription sent to pharmacy  Increased risk for preeclampsia to start ASA at 12 weeks  Last pap = 23  Pre-pregnant BMI 34.2  EDPS = 4, \"never\" to #10    Plan:   -US for dating and viability   -IOB labs drawn. A1C done last month so not retested.   -Pt is not a candidate for drawing lead level per [facility] screening tool. Adrien declines testing  -Patient is not a candidate for waterbirth.  Waterbirth education shared in IOB packet.   -Reviewed prenatal care schedule.   -Optimal nutrition and weight gain discussed. Pregnancy weight gain of 11-20 lbs (BMI 30.0-34.9) encouraged.   -Anticipatory guidance for common pregnancy questions and concerns reviewed.   -Danger s/sx for this trimester reviewed with patient.   -Reviewed genetic screening options with patient, patient does elect for first trimester screening and will make additional visit after confirmation of pregnancy dating.   -Reviewed carrier testing options with patient, patient does not elect for testing or referral to genetic counseling.  -IOB packet given and reviewed with patient.   -CNM services and hospital options reviewed; emergency and scheduling phone numbers given to patient.   -Because the patient does have 2 or more moderate risk factors, low-dose aspirin will be initiated at 12 weeks. Prescription sent to pharmacy on file.   -Antepartum VTE risk factors absent.  -Pt is not a candidate for an antepartum OB consult.    -Return to clinic in 4 weeks or sooner as needed.      Subjective:   Adrien Mcelroy is a 33 year old  here today for her first obstetrical exam at 10 weeks and 1 day gestation. Here with her  Conor . This pregnancy is planned. Attempting pregnancy for 3 years. The patient reports nausea, " vomiting, fatigue, and breast tenderness. She has a certain Patient's last menstrual period was 2024 (exact date)., predicting an expected date of delivery of Estimated Date of Delivery: May 20, 2025. Last period was normal. Her previous three cycles were irregular. Her pregnancy is dated by US.     The patient states that she is in a monogamous relationship.  Unable to assess safety due to partner present.    Offered GC/CT screening today and patient accepts-self collected.  Additional pregnancy symptoms include: breast tenderness, fatigue, frequent urination, nausea, and positive home pregnancy test.     Risk factors: pre-pregnancy obesity. Pregnancy Risk Factors:None    The patient has the following high risk factors for preeclampsia: none.   The patient has the following moderate risk factors for preeclampsia: first pregnancy and BMI greater than 30    The patient has the following antepartum risk factors for VTE (two or more risk factors, or 1 * risk factor, places patient at higher risk): none.    Clinical history/risk factors requiring antepartum OB consult: none.    Social History:   Education level: Some college   Occupation: [employer]   Partners name: Conor Hough   ?   OB History    Para Term  AB Living   1 0 0 0 0 0   SAB IAB Ectopic Multiple Live Births   0 0 0 0 0      # Outcome Date GA Lbr David/2nd Weight Sex Type Anes PTL Lv   1 Current                 History:   Past Medical History:   Diagnosis Date    Attention deficit hyperactivity disorder (ADHD), unspecified ADHD type 2016    External hemorrhoids 2016    Hemorrhoids 2016    Iron deficiency anemia due to chronic blood loss 2017    Major depressive disorder, recurrent episode, moderate (H) 2016    Major depressive disorder, recurrent episode, moderate (H) 2016    Menorrhagia with irregular cycle 2018    Post concussive syndrome 2016      Past Surgical History:   Procedure Laterality Date     BAND HEMORRHOIDECTOMY  2018 x 4    LUMBAR FUSION  07/2010    WISDOM TOOTH EXTRACTION        Family History   Problem Relation Age of Onset    No Known Problems Mother     Lymphoma Father 48        recurrence 2018    Kidney Disease Maternal Grandmother     No Known Problems Brother     No Known Problems Brother     No Known Problems Sister     No Known Problems Sister     No Known Problems Sister     Thyroid Cancer No family hx of     Multiple endocrine neoplasia No family hx of       Social History     Tobacco Use    Smoking status: Never     Passive exposure: Never    Smokeless tobacco: Never   Vaping Use    Vaping status: Never Used   Substance Use Topics    Alcohol use: No     Alcohol/week: 0.0 - 0.8 standard drinks of alcohol    Drug use: No      Current Outpatient Medications   Medication Sig Dispense Refill    aspirin (ASA) 81 MG chewable tablet Take 1 tablet (81 mg) by mouth daily. 90 tablet 3    calcium carbonate (TUMS) 500 MG chewable tablet Take 1 tablet (500 mg) by mouth 2 times daily. 90 tablet 1    EPINEPHrine (ANY BX GENERIC EQUIV) 0.3 MG/0.3ML injection 2-pack Inject 0.3 mLs (0.3 mg) into the muscle as needed 2 each 0    omeprazole (PRILOSEC) 20 MG DR capsule Take 1 capsule (20 mg) by mouth daily. 90 capsule 0    SENNA-docusate sodium (SENNA S) 8.6-50 MG tablet Take 1 tablet by mouth at bedtime. 90 tablet 2      Allergies   Allergen Reactions    Covid-19 (Mrna) Vaccine Anaphylaxis    Other Drug Allergy (See Comments) Anaphylaxis     COVID-19 VACCINE, MRNA, [id number], LNP-S (PFIZER)    Contrave [Naltrexone-Bupropion Hcl Er] Headache, Nausea and Vomiting and Visual Disturbance     dizziness, and stomach pain.    Ciprofloxacin Unknown    Other Environmental Allergy Unknown     Seasonal         The patient's medical, surgical and family histories were reviewed and were not pertinent to this visit.     Pap smear: Last Pap: 5/9/23, Result: NILM/HPV negative, Previous History: WNL, Any history of abnormal:  "No. Next Due: 5/9/28.     EPDS score today: 4.\"never\" to #10.  Currently sees therapist and that is going well.  History of anxiety or depression: no    Review of Systems   General: Fatigue but otherwise denies problem   Eyes: Denies problem   Ears/Nose/Throat: Denies problem   Cardiovascular: Denies problem   Respiratory: Denies problem   Gastrointestinal: Nausea without vomiting, bloating and constipation  Genitourinary: Denies any discharge, vaginal bleeding or itchiness or any other problem.  Increased urinary frequency  Musculoskeletal: Breast tenderness otherwise denies problem.  Longstanding problems with back pain  Skin: Denies problem   Neurologic: Denies problem   Psychiatric: Denies problem   Endocrine: Denies problem   Heme/Lymphatic: Denies problem   Allergic/Immunologic: Denies problem           Objective:   Objective    Vitals:    10/23/24 0752   BP: 104/68   Pulse: 85   SpO2: 97%   Weight: 91.6 kg (202 lb)   Height: 1.6 m (5' 3\")        Physical Exam:   General Appearance: Alert, cooperative, no distress, appears stated age   HEENT: Normocephalic, without obvious abnormality, atraumatic. Conjunctiva/corneas clear  Neck: Supple, symmetrical, trachea midline, no adenopathy.   Thyroid: not enlarged, symmetric, no tenderness/mass/nodules   Lungs: Clear to auscultation bilaterally, respirations unlabored   Heart: Regular rate and rhythm, S1 and S2 normal, no murmur, rub, or gallop. No edema to lower extremities.   Breasts: deferred, would like breast reduction in the future  Abdomen: Gravid, soft, non-tender, bowel sounds active all four quadrants, no masses.   FHT: Not attempted, schedule for US   Vulva: deferred  Vagina: Deferred, self collection of gonorrhea and chlamydia  Cervix: Deferred  Uterus: non tender, enlarged approximately 10 weeks size   Adnexa:  no masses appreciated, non-tender with palpation   Musculoskeletal: Extremities normal, atraumatic, no cyanosis   Skin: Skin color, texture, turgor " normal, no rashes or lesions   Lymph nodes: Cervical, supraclavicular, and axillary nodes normal   Neurologic: Alert and oriented x 3. Normal speech     Ashley Blackburn CNM

## 2024-10-23 NOTE — PATIENT INSTRUCTIONS
"\"We hope you had a positive experience and that you can definitely recommend [facility] to your family and friends. You ll be receiving a survey soon and we look forward to hearing your feedback\".    Welcome to [facility]   and thank you for choosing us for your maternity care provider!  Congratulations!      [employer]  After each of your visits you are welcome to check [facility] for your visit summary including education and links to information relevant to your pregnancy and/or well woman care.   Find the \"Visits\" tab at the top of the page, you will see a list of recent visits and for each visit a for link for \"View After Visit Summary.\" View of your After Visit Summary will allow you to read our recommendations from your visit, review any education provided, and link to websites with useful information.   If you have any questions or difficulty navigating [facility], please feel free to contact us and we will do our best to direct you.  Meet the Midwives from [facility]  You are invited to an informational meet and greet with [facility]'s [facility] Certified Nurse-Midwives. Our free \"Meet the Midwives\" event is a great opportunity to learn about our midwives' philosophy and experience, the hospitals where we can assist with your birth, and answer questions you may have. Partners, friends, and family are welcome to attend. Currently, this is a virtual event.  Date  Last Thursday of every month at 7 pm.    Link to next (live) meeting   [email or web address]  To Join by Telephone (audio only) Call:   [phone] Phone Conference ID: [phone] #    Contact information:  Appointment line and to get a hold of CNM in clinic Monday-Friday 8 am - 5 pm:  [phone].  There are some clinics with early start times (1st appointment 7:40 am) and others with evening hours (last appointment 6:20 pm).  Most are typically open from 8 " am to 5 pm.    CNM on call answering service: [phone].  Specify your hospital of choice and leave a brief message for CNM;  will then page CNM who is on call at your specified hospital and you should receive a call back with 15 minutes.  Be sure that your ringer is audible and that you can accept blocked calls so that we can get back in touch with you! This number should be reserved for urgent needs if during the day, before 8 am, after 5 pm, weekends, holidays.      We support all families in their infant feeding journey. We'll provide education on Breastfeeding/Chestfeeding early and often to help you achieve your goals. Please let us know if you have questions along the way!      Breastfeeding: a Healthy Option for You and Your Baby  Consider breastfeeding for the healthiest way to feed your baby. Ask your midwife or physician for more information.     The choice of how you will feed your baby is important.  Before your baby s birth, you ll want to learn about the benefits of breastfeeding.  [facility] ([place] and [facility]) continue to support Baby Friendly standards; an initiative that was created by the World Health Organization and UNICEF.  This helps give you and your baby the best start in feeding their baby.    Why should I breastfeed my baby?   Babies are less likely to develop childhood obesity or diabetes   Babies are less likely to suffer from recurrent ear infections   Babies are less likely to be hospitalized for respiratory conditions   Breast milk is rich in nutrients and antibodies-it is easy to digest    How does it benefit me?   Lowers the risk for diabetes, breast and ovarian cancer and postpartum depression   Moms can lose  baby weight  more quickly   Cost savings - formula can cost well over $1,500 per year   Convenient - no bottles and nipples to sterilize, no measuring and mixing formula   The physical contact with  breastfeeding can make babies feel secure, warm and comforted     What about formula?  While you and your baby are staying with us at [facility], we will support whatever feeding choice you make for your baby.    Some important considerations:    The American Academy of Pediatrics, the World Health Organization, and many more organizations recommend exclusive breastfeeding for 6 months and continued breastfeeding while adding other foods for the first 1-2 years.    Any amount of breastmilk has benefits to both baby and mother.  Giving formula in replacement of breastfeeding can affect mother s milk supply.  If formula is needed, hospital staff will work with you on a plan to help develop your milk supply.  Formula alters the natural growth of good bacteria in the  stomach.   Research has found that first time mothers who offer formula in the hospital have a shorter duration of breastfeeding.    How can I start to prepare?   Start by having a conversation with your medical provider.   Talk with your partner, family and friends.   Attend a prenatal class that includes breastfeeding preparation. Birth and breastfeeding classes are offered by [employer]. Visit [employer] for class information.   After your baby s birth, hospital staff and lactation consultants will help you and your baby get off to a great start with breastfeeding.      RESOURCES  [facility] Maternity Care:   [email or web address] M[facility] Care:   [email or web address].[email or web address]/locations/[facility]        Breastfeeding:    OUTPATIENT LACTATION RESOURCES     -Schedule an appointment with a [facility]wives [facility] MICHELLE who is also a Lactation Consultant by calling [phone]. We see women for breastfeeding visits at [facility] and [facility].      Chocolate Milk Club:  http://www.CapsoVisionvesselsmidwiferysJobpartnersices.com/chocolate-milk-club/  Join the Facebook group or join us for support on the first Monday of each month from 7 to 9 p.m.  , Dr. Shira Aburto, DNP, CNM, CNP, IBCLC, ICEA  Phone: [phone]  Fax: [phone]  Email: [email or web address]    R.O.S.E. = Reaching our Sisters Everywhere  Http://www.breastfeedingrose.org/    Black Women Do Breastfeed Blog  www.blackwomendobreastfeed.org    Club Mom breastfeeding support for Black mothers:  Contact Sol Elaine  Phone: [phone]   Email:  [email or web address].     Valentina Do  Phone: [phone]   Email:  [email or web address].    Club Dad parent support for Black fathers:   Contact Fernando Rosales   Phone: [phone]   Email:  [email or web address].    The ong Breastfeeding Coalition is a wonderful support for [facility] women who are breastfeeding.  They are best found on Facebook.      The Hmong Breastfeeding Coalition has produced a collection of video stories about breastfeeding in the ong community, produced by the Hmong Breastfeeding Coalition.  Most are in English, but one on handling breastmilk is in Hmong.  The video collection is in the middle of the page.    This page also has several other resources on ong breastfeeding.     https://mnbreastfeedingcoalition.org/communities/    WIC program application: Yakov Kaylynn   Https://www.youTastyKhanaube.com/watch?v=lQoWwPoyGYc    For information on Local WIC services call:  [phone]    You may qualify...  If you are pregnant, nursing, or have a child under age 5, we encourage you to Apply for WIC.    WIC Provides...  Nutrition tips and advice  Support for breastfeeding  Healthy foods like fresh fruits and vegetables, whole grain cereals, bread and tortillas, low fat milk, and baby foods  Caring and supportive staff  Don't delay! We're here to help!  CALL TODAY FOR A WIC  CLINIC NEAR YOU  1-273-DKP-5614 or 1-187.175.6400     New Parent Connection:   Paula Becerra, [address] [facility]  In-person meetings on  from 6 pm - 7:15 pm for parents of  to 9 months, at the same site.   All are free, drop-in, no registration required.    There are also free virtual meetings ongoing on :  11:30 am - 12:30 pm for parents of newborns to 3 months  4:15 pm to 5:15 pm for parents of 3 to 9-month olds  For joining info parents should call Edith Burns at [phone]    -Baby Café  Find a Baby Café - Baby Café 60mo (babyImmaculate Baking.Waps.cn)   Search by State ([place]) to find the nearest cafe to you      [facility] Baby Café  Due to COVID-19, all Baby Café sessions are canceled until further notice. For lactation support, please contact one of our bilingual staff:  Hanna (IBCLC) [phone]  Rosi (IBCLC/ Kazakh) [phone]  Zotami (Hmong) [phone]  Cas (Macanese) [phone]  Baby Café is a free, drop-in service offering breastfeeding/chestfeeding support. Come share tips and socialize with other pregnant, breastfeeding/chestfeeding families. Babies and siblings are welcome (no  available).  We offer:  Professionally trained lactation staff.  Resource books for lending.  Relaxed and fun atmosphere.  Refreshments.   More information  Rosi Rubio  [phone]  [email or web address].     -Attend a baby weigh in at [facility].  Lactation consultants are available to answer questions  [place]:  1:00 - 2:00  [facility]:  1:00 - 2:00   www.Awesome.meSan Francisco General HospitalForce Impact Technologies.[employer]    -Attend one of the New Mama groups at [facility] in [facility].  [facility] also offers one-on-one in home and in office lactation consults.   www.[facility].[employer]    -Attend a LeLeche League meeting.  Multiple groups in several locations throughout the [facility]. The meetings are no-cost and always informative breastfeeding education session  through Internjessica La Leche Herbert  Www.tyree.org/     Held at [facility] the second Thursday of each month at 7pm    Childbirth and Parenting Education:     Everyday Miracles:   https://www.everyday-miracles.org/    Free Video Series from [facility]: https://nursing.[facility]/academics/specialty-areas/nurse-midwifery/having-baby-prenatal-videos/having-baby-prenatal-and    Childbirth Education virtual (live) classes: www.The Noun Project/classes  The Birth Hour: https://Whim/online-childbirth-class/  BirthED: https://www.birthedmn.com/  CATHERINE parenting center: http://Easy SolutionsEllenville Regional HospitalExploretrip/   (678) 011-QIFY  [place]: (education, yoga & wellness) www.LiveSafe  Enlightened Mama: www.enlightenedmamaChronicity   Childbirth collective: (Parent topic nights)  www.childbirthcollective.org/  Hypnobabies:  www.hypnobabiestrVue.Haivision/  Hypnobirthing:  Http://Lecorpio.Haivision/  Hypnobirthing virtual class: www.[employer]/hypnobirthing    Information about doulas:  Childbirth collective: http://www.childbirthcollective.org/  Doulas of [place] (MONALISA):  www.monalisa.org  [place] campus  project: http://twincitiesdoulaproject.com/     Early Childhood and Family Education (ECFE):  ECFE offers parents hands-on learning experiences that will nourish a lifetime of teachable moments.  http://ecfe.info/ecfe-home/    APPS and Podcasts:   Cody Moore Nurture    Evidence Based Birth  The Birth Hour (for birth stories)   Birthful   Expectful   The Longest Shortest Time  PregnancyPodcast Keshia Dalton  https://www.downtobirthshow.com/    Book Recommendations:   Phyllis [place]'s Birthing From Within--first few chapters include a new-age tone, you may prefer to skip it and keep going, because there is good stuff later.  This book recommendation covers emotional preparation, but does cover coping with pain, and use of both pharmacological and nonpharmacological methods.    Guide to Childbirth by  "Bronson May Alfredo  Childbirth Without Fear by Kiara Campbell Read    Dr. Bonilla' The Pregnancy Book and The Birth Book--the pregnancy book goes month-by month    The Birth Partner by Mohini Zuñiga    Womanly Art of Breastfeeding by La Leche League International   Bestfeeding by Josey Ybarra--great pictures    Mothering Your Nursing Toddler, by Felicia Villela.   Addresses dealing with so many of the challenging behaviors of a nursing toddler.  How Weaning Happens, by La Leche League.  Discusses weaning at all ages, from medically necessary weaning of an infant, all the way up to age 5 (or older), with why/why not, and strategies.  Very empowering book both for deciding to wean and deciding not to.    American College of Nurse-Midwives (ACNM) http://www.midwife.org/; look at the informational handouts at http://www.midwife.org/Share-With-Women     www.mymidwife.org    Mother to Baby (Medication and Herbal guidance in pregnancy): http://www.mothertobaby.org  Toll-Free Hotline: 988.471.3562  LactMed (Medication use while breastfeeding): http://toxnet.nlm.nih.gov/newtoxnet/lactmed.htm    Women's Health.gov:  http://www.womenshealth.gov/a-z-topics/index.html    American pregnancy association - http://americanpregnancy.org    Centering Pregnancy (group prenatal care option): http://centeringhealthcare.org    March of Dimes www.[employer]..com     FDA - Nutrition  www.mypyramid.gov  Under \"For Consumers,\" click on \"pregnant and breastfeeding women.\"      Centers for Disease Control and Prevention (CDC) - Vaccines : http://www.cdc.gov/vaccines/       When researching information on the web, question the validity of websites.  The domains .gov, .edu and.org tend to be more reliable information.  If there are a lot of advertisements, be cautious of the information provided. Stay away from blogs and chat rooms please!    "

## 2024-10-24 ENCOUNTER — MYC MEDICAL ADVICE (OUTPATIENT)
Dept: MIDWIFE SERVICES | Facility: CLINIC | Age: 34
End: 2024-10-24
Payer: COMMERCIAL

## 2024-10-24 DIAGNOSIS — O09.91 SUPERVISION OF HIGH RISK PREGNANCY, ANTEPARTUM, FIRST TRIMESTER: Primary | ICD-10-CM

## 2024-10-24 PROBLEM — O09.899 RUBELLA NON-IMMUNE STATUS, ANTEPARTUM: Status: ACTIVE | Noted: 2024-10-24

## 2024-10-24 PROBLEM — Z28.39 RUBELLA NON-IMMUNE STATUS, ANTEPARTUM: Status: ACTIVE | Noted: 2024-10-24

## 2024-10-24 LAB
BACTERIA UR CULT: NORMAL
RUBV IGG SERPL QL IA: 0.61 INDEX
RUBV IGG SERPL QL IA: NORMAL

## 2024-10-25 RX ORDER — PNV NO.95/FERROUS FUM/FOLIC AC 28MG-0.8MG
1 TABLET ORAL DAILY
Qty: 60 TABLET | Refills: 1 | Status: SHIPPED | OUTPATIENT
Start: 2024-10-25 | End: 2024-12-24

## 2024-11-06 ENCOUNTER — OFFICE VISIT (OUTPATIENT)
Dept: MIDWIFE SERVICES | Facility: CLINIC | Age: 34
End: 2024-11-06
Payer: COMMERCIAL

## 2024-11-06 VITALS
WEIGHT: 204.9 LBS | BODY MASS INDEX: 36.3 KG/M2 | SYSTOLIC BLOOD PRESSURE: 110 MMHG | HEART RATE: 80 BPM | DIASTOLIC BLOOD PRESSURE: 74 MMHG | HEIGHT: 63 IN

## 2024-11-06 DIAGNOSIS — E66.812 CLASS 2 SEVERE OBESITY WITH SERIOUS COMORBIDITY AND BODY MASS INDEX (BMI) OF 35.0 TO 35.9 IN ADULT, UNSPECIFIED OBESITY TYPE (H): ICD-10-CM

## 2024-11-06 DIAGNOSIS — O09.91 SUPERVISION OF HIGH RISK PREGNANCY, ANTEPARTUM, FIRST TRIMESTER: Primary | ICD-10-CM

## 2024-11-06 DIAGNOSIS — E66.01 CLASS 2 SEVERE OBESITY WITH SERIOUS COMORBIDITY AND BODY MASS INDEX (BMI) OF 35.0 TO 35.9 IN ADULT, UNSPECIFIED OBESITY TYPE (H): ICD-10-CM

## 2024-11-06 DIAGNOSIS — N89.8 VAGINAL DISCHARGE: ICD-10-CM

## 2024-11-06 DIAGNOSIS — O09.899 RUBELLA NON-IMMUNE STATUS, ANTEPARTUM: ICD-10-CM

## 2024-11-06 DIAGNOSIS — Z28.39 RUBELLA NON-IMMUNE STATUS, ANTEPARTUM: ICD-10-CM

## 2024-11-06 LAB
C TRACH DNA SPEC QL PROBE+SIG AMP: NEGATIVE
CLUE CELLS: NORMAL
N GONORRHOEA DNA SPEC QL NAA+PROBE: NEGATIVE
TRICHOMONAS, WET PREP: NORMAL
WBC'S/HIGH POWER FIELD, WET PREP: NORMAL
YEAST, WET PREP: NORMAL

## 2024-11-06 PROCEDURE — 87491 CHLMYD TRACH DNA AMP PROBE: CPT | Performed by: MIDWIFE

## 2024-11-06 PROCEDURE — 87591 N.GONORRHOEAE DNA AMP PROB: CPT | Performed by: MIDWIFE

## 2024-11-06 PROCEDURE — 36415 COLL VENOUS BLD VENIPUNCTURE: CPT | Performed by: MIDWIFE

## 2024-11-06 PROCEDURE — 99213 OFFICE O/P EST LOW 20 MIN: CPT | Performed by: MIDWIFE

## 2024-11-06 PROCEDURE — 87210 SMEAR WET MOUNT SALINE/INK: CPT | Performed by: MIDWIFE

## 2024-11-06 NOTE — PROGRESS NOTES
Adrien presents to [facility] clinic alone for a problem prenatal visit at 10 w 6d.  Dark green vaginal discharge. Odor, back sore.cramping, no bleeding.  She and her  are monogamous as far she knows but is happy to have STI testing today just in case.  Feeling so much better no nausea/vomiting.  Reviewed IOB labs (all within normal limits except rubella nonimmune, will need postpartum), dating ultrasound (within normal limits with small subchorionic hemorrhage, see below).   IOB appointment was on 10/23/2024 and had an ultrasound right after (see study information below) was told to come back in for follow-up visit to discuss options for testing  Disc option for genetic screening: NIPT.  Desires nips today. Wants to know sex of baby, may leave results in my Chart     Pregnancy discomforts include: back ache  Sleeping: ok, waking some, but can fall back to sleep.  Fetal movement: Not yet    Second trimester pregnancy anticipatory guidance reviewed.     Enc follow-up in 4 weeks or sooner prn.      Study Result    Narrative & Impression   EXAM: US OB < 14 WEEKS SINGLE-TRANSABDOMINAL  LOCATION: [facility]  DATE: 10/23/2024     INDICATION: dating and viability  COMPARISON: None.  TECHNIQUE: Transabdominal scans were performed.      FINDINGS:  UTERUS: Single normal appearing intrauterine gestation sac.  CRL: Measures 2.2 cm, equals 8 weeks 6 days  RATE OF CARDIAC ACTIVITY: 170 bpm.  AMNIOTIC FLUID: Normal.  PLACENTA: Not yet formed. Small subchorionic hemorrhage measuring 1.3 x 0.6 x 0.4 cm.     RIGHT OVARY: Normal.  LEFT OVARY: Normal.                                                                      IMPRESSION:   1.  Single intrauterine gestation with cardiac activity at 8 weeks 6 days, with EDC of 05/29/2025.  2.  Small subchorionic hemorrhage.

## 2024-11-15 LAB — SCANNED LAB RESULT: NORMAL

## 2024-11-19 NOTE — PROGRESS NOTES
Adrien presents to [facility] clinic with partner Conor for a routine prenatal visit at 12w6d.   Feeling ok, but still has nausea/no vomiting. Headaches improved.  Reviewed IOB labs, dating ultrasound already reviewed.  Had a problem visit for Vaginal discharge on 11/6/24--all tests WNL, now s/s all good now  Had NIPT done on 11/6/24--results low risk, male  Asa low dose started at 12 weeks  Anatomy scan order NV.,  Offer AFP next visit  Pregnancy discomforts include: tired, breast tenderness better.  Sleeping: not sleeping well--can fall asleep but if wakes at 3 or 4, then hard to fall back   Fetal movement: not yet    Second trimester pregnancy anticipatory guidance reviewed.     Enc follow-up in 4 weeks or sooner prn.

## 2024-11-20 ENCOUNTER — PRENATAL OFFICE VISIT (OUTPATIENT)
Dept: MIDWIFE SERVICES | Facility: CLINIC | Age: 34
End: 2024-11-20
Payer: COMMERCIAL

## 2024-11-20 VITALS
BODY MASS INDEX: 36.14 KG/M2 | DIASTOLIC BLOOD PRESSURE: 66 MMHG | HEART RATE: 82 BPM | HEIGHT: 63 IN | WEIGHT: 204 LBS | SYSTOLIC BLOOD PRESSURE: 112 MMHG | OXYGEN SATURATION: 98 %

## 2024-11-20 DIAGNOSIS — O09.91 SUPERVISION OF HIGH RISK PREGNANCY, ANTEPARTUM, FIRST TRIMESTER: Primary | ICD-10-CM

## 2024-11-20 DIAGNOSIS — E66.812 CLASS 2 SEVERE OBESITY WITH SERIOUS COMORBIDITY AND BODY MASS INDEX (BMI) OF 35.0 TO 35.9 IN ADULT, UNSPECIFIED OBESITY TYPE (H): ICD-10-CM

## 2024-11-20 DIAGNOSIS — E66.01 CLASS 2 SEVERE OBESITY WITH SERIOUS COMORBIDITY AND BODY MASS INDEX (BMI) OF 35.0 TO 35.9 IN ADULT, UNSPECIFIED OBESITY TYPE (H): ICD-10-CM

## 2024-11-20 PROCEDURE — 99207 PR PRENATAL VISIT: CPT | Performed by: MIDWIFE

## 2024-12-15 NOTE — PROGRESS NOTES
Adrien presents to [facility] clinic alone for a routine prenatal visit at 16w6d. Feeling tired but just had a massage.  Migraine starts at 9 pm until falls asleep (2 hrs later). Left side back of head, common for pt.and declined.  Craving soda pop. Not liking taste of water. Has been using some Coke a cola for nausea and that really helped. Now craving it a lot.  Already done last prenatal visit: Reviewed IOB labs, dating ultrasound. (All WNL)  Disc option for genetic screening: single marker AFP offered  Anatomy scan ordered.  Pregnancy discomforts include: tired and migranes as above  Sleeping: well  Fetal movement: yes maybe some flutters now    Second trimester pregnancy anticipatory guidance reviewed.     Enc follow-up in 4 weeks or sooner prn.

## 2024-12-18 ENCOUNTER — PRENATAL OFFICE VISIT (OUTPATIENT)
Dept: MIDWIFE SERVICES | Facility: CLINIC | Age: 34
End: 2024-12-18
Payer: COMMERCIAL

## 2024-12-18 VITALS
OXYGEN SATURATION: 98 % | SYSTOLIC BLOOD PRESSURE: 110 MMHG | DIASTOLIC BLOOD PRESSURE: 64 MMHG | BODY MASS INDEX: 37.02 KG/M2 | WEIGHT: 209 LBS | HEART RATE: 88 BPM

## 2024-12-18 DIAGNOSIS — F41.1 GENERALIZED ANXIETY DISORDER: ICD-10-CM

## 2024-12-18 DIAGNOSIS — O09.90 SUPERVISION OF HIGH RISK PREGNANCY, ANTEPARTUM: Primary | ICD-10-CM

## 2024-12-18 DIAGNOSIS — E66.812 CLASS 2 SEVERE OBESITY WITH SERIOUS COMORBIDITY AND BODY MASS INDEX (BMI) OF 35.0 TO 35.9 IN ADULT, UNSPECIFIED OBESITY TYPE (H): ICD-10-CM

## 2024-12-18 DIAGNOSIS — E66.01 CLASS 2 SEVERE OBESITY WITH SERIOUS COMORBIDITY AND BODY MASS INDEX (BMI) OF 35.0 TO 35.9 IN ADULT, UNSPECIFIED OBESITY TYPE (H): ICD-10-CM

## 2024-12-18 PROCEDURE — 99207 PR PRENATAL VISIT: CPT | Performed by: MIDWIFE

## 2025-01-10 ENCOUNTER — HOSPITAL ENCOUNTER (OUTPATIENT)
Dept: ULTRASOUND IMAGING | Facility: HOSPITAL | Age: 35
Discharge: HOME | End: 2025-01-10
Attending: MIDWIFE | Admitting: MIDWIFE
Payer: COMMERCIAL

## 2025-01-10 DIAGNOSIS — O09.90 SUPERVISION OF HIGH RISK PREGNANCY, ANTEPARTUM: ICD-10-CM

## 2025-01-10 DIAGNOSIS — E66.812 CLASS 2 SEVERE OBESITY WITH SERIOUS COMORBIDITY AND BODY MASS INDEX (BMI) OF 35.0 TO 35.9 IN ADULT, UNSPECIFIED OBESITY TYPE (H): ICD-10-CM

## 2025-01-10 DIAGNOSIS — E66.01 CLASS 2 SEVERE OBESITY WITH SERIOUS COMORBIDITY AND BODY MASS INDEX (BMI) OF 35.0 TO 35.9 IN ADULT, UNSPECIFIED OBESITY TYPE (H): ICD-10-CM

## 2025-01-10 PROCEDURE — 76805 OB US >/= 14 WKS SNGL FETUS: CPT

## 2025-01-20 NOTE — PROGRESS NOTES
Adrien presents to [facility] clinic for a routine prenatal visit at 21w6d. Feeling ***.  Anatomy scan reviewed. (WNL, normal growth)  Pregnancy discomforts include: ***  Sleeping: ***  Fetal movement: ***    Mid pregnancy anticipatory guidance reviewed.   Discussed 1 hr glucose instructions for NV    Enc follow-up in 4weeks or sooner prn.

## 2025-01-22 ENCOUNTER — PRENATAL OFFICE VISIT (OUTPATIENT)
Dept: MIDWIFE SERVICES | Facility: CLINIC | Age: 35
End: 2025-01-22
Payer: COMMERCIAL

## 2025-01-22 VITALS
HEIGHT: 63 IN | OXYGEN SATURATION: 99 % | BODY MASS INDEX: 37.92 KG/M2 | DIASTOLIC BLOOD PRESSURE: 60 MMHG | HEART RATE: 82 BPM | WEIGHT: 214 LBS | SYSTOLIC BLOOD PRESSURE: 118 MMHG

## 2025-01-22 DIAGNOSIS — K64.9 HEMORRHOIDS, UNSPECIFIED HEMORRHOID TYPE: ICD-10-CM

## 2025-01-22 DIAGNOSIS — F41.1 GENERALIZED ANXIETY DISORDER: ICD-10-CM

## 2025-01-22 DIAGNOSIS — O09.90 SUPERVISION OF HIGH RISK PREGNANCY, ANTEPARTUM: Primary | ICD-10-CM

## 2025-01-22 DIAGNOSIS — E66.812 CLASS 2 SEVERE OBESITY WITH SERIOUS COMORBIDITY AND BODY MASS INDEX (BMI) OF 35.0 TO 35.9 IN ADULT, UNSPECIFIED OBESITY TYPE (H): ICD-10-CM

## 2025-01-22 DIAGNOSIS — O22.42 HEMORRHOIDS IN PREGNANCY, SECOND TRIMESTER: ICD-10-CM

## 2025-01-22 DIAGNOSIS — E66.01 CLASS 2 SEVERE OBESITY WITH SERIOUS COMORBIDITY AND BODY MASS INDEX (BMI) OF 35.0 TO 35.9 IN ADULT, UNSPECIFIED OBESITY TYPE (H): ICD-10-CM

## 2025-01-22 DIAGNOSIS — O09.899 RUBELLA NON-IMMUNE STATUS, ANTEPARTUM: ICD-10-CM

## 2025-01-22 DIAGNOSIS — Z28.39 RUBELLA NON-IMMUNE STATUS, ANTEPARTUM: ICD-10-CM

## 2025-01-22 PROBLEM — Z00.00 ENCOUNTER FOR PREVENTIVE CARE: Status: RESOLVED | Noted: 2023-05-09 | Resolved: 2025-01-22

## 2025-01-22 PROCEDURE — 99207 PR PRENATAL VISIT: CPT | Performed by: ADVANCED PRACTICE MIDWIFE

## 2025-01-22 NOTE — PROGRESS NOTES
"Adrien presents to [facility] clinic for a routine prenatal visit at 21w6d. Feeling well.     Thinks she has hemorrhoids. Only symptom is bleeding with BMs. Thinks they are internal, does not feel anything externally. Typically \"has to wipe several times\" and notes a fair amt of bleeding with BMs. Has had internal hemorrhoids several years prior to pregnancy and had them surgically removed due to bleeding. Has tried OTC treatments without any relief. Open to GI referral due to complicated hx and significant bleeding.      Anatomy scan reviewed. (WNL, normal growth)    Overall and interval wt gain slightly excessive. Enc daily walks, portion moderation and increasing protein.     Mid pregnancy anticipatory guidance reviewed. Discussed 1 hr glucose instructions for NV. Enc follow-up in 4weeks or sooner prn.    "

## 2025-01-26 PROBLEM — K64.9 HEMORRHOIDS, UNSPECIFIED HEMORRHOID TYPE: Status: ACTIVE | Noted: 2025-01-26

## 2025-01-26 NOTE — PATIENT INSTRUCTIONS
"\"We hope you had a positive experience and that you can definitely recommend [facility] to your family and friends. You ll be receiving a survey soon and we look forward to hearing your feedback\".    [facility] Contact information:  Appointment line and to get a hold of CNM in clinic Monday-Friday 8 am - 5 pm:  [phone].  There are some clinics with early start times (1st appointment 7:40 am) and others with evening hours (last appointment 6:20 pm).  Most are typically open from 8 am to 5 pm.    CNM on call answering service: [phone].  Specify your hospital of choice and leave a brief message for CNM;  will then page CNM who is on call at your specified hospital and you should receive a call back with 15 minutes.  Be sure that your ringer is audible and that you can accept blocked calls so that we can get back in touch with you! This number should be reserved for urgent needs if during the day, before 8 am, after 5 pm, weekends, holidays.    Contact the on-call CNM with warning signs, such as:  vaginal bleeding   Vaginal discharge and itching or pain and burning during urination  Leg/calf pain or swelling on one side  severe abdominal pain  nausea and vomiting more than 4-5 times a day, or if you are unable to keep anything down  fever more than 100.4 degrees F.   [employer]  After each of your visits you are welcome to check [employer] for your visit summary including education and links to information relevant to your pregnancy and/or well woman care.   Find the \"Visits\" tab at the top of the page, you will see a list of recent visits and for each visit a for link for \"View After Visit Summary.\" View of your After Visit Summary will allow you to read our recommendations from your visit, review any education provided, and link to websites with useful information.   If you have any questions or difficulty navigating [employer]), please feel free to contact " "us and we will do our best to direct you.  Meet the Midwives from [facility]  You are invited to an informational meet and greet with [facility] Certified Nurse-Midwives. Our free \"Meet the Midwives\" event is a great opportunity to learn about our midwives' philosophy and experience, the hospitals where we can assist with your birth, and answer questions you may have. Partners, friends, and family are welcome to attend. Currently, this is a virtual event.  Date  Last Thursday of every month at 7 pm.    Link to next (live) meeting  [email or web address]  To Join by Telephone (audio only) Call:   [phone] Phone Conference ID: [phone] #    Childbirth and Parenting Education:     Everyday Miracles:   https://www.everyday-miracles.org/    Free Video Series from [facility]: https://nursing.[facility]/academics/specialty-areas/nurse-midwifery/having-baby-prenatal-videos/having-baby-prenatal-and    Childbirth Education virtual (live) classes: www.[employer]/classes  The Birth Hour: https://Civic Artworks/online-childbirth-class/  BirthED: https://www.birthedmn.com/  CATHERINE parenting center: http://amJohn D. Dingell Veterans Affairs Medical CenteringPrairieSmarts.Environmental Support Solutions/   (039) 007-XQIN  Blooma: (education, yoga & wellness) www.Wise Intervention Services  Enlightened Mama: www.enlightenedmama.com   Childbirth collective: (Parent topic nights)  www.childbirthcollective.org/  Hypnobabies:  www.hypnobabiestBest Five Reviewedties.com/  Hypnobirthing:  Http://hypnauthorSTREAM.comrtTanyas Jewelry.Environmental Support Solutions/  Hypnobirthing virtual class: www.Acousticeye/hypnobirthing    Information about doulas:  Childbirth collective: http://www.childbirthcollective.org/  Doulas of [place] (MONALISA):  www.monalisa.org  [facility]  project: http://FORMTEKtiesdoulaSolarWindsject.com/     Early Childhood and Family Education (ECFE):  ECFE offers parents hands-on learning experiences that will nourish a lifetime of teachable " moments.  http://ecfe.info/ecfe-home/    APPS and Podcasts:   Cody Uriarte    Evidence Based Birth  The Birth Hour (for birth stories)   Birthful   Expectful   The Longest Shortest Time  PregnancyPodcast Keshia Dalton  https://www.downtobirthshow.com/    Book Recommendations:   Phyllis Fairland's Birthing From Within--first few chapters include a new-age tone, you may prefer to skip it and keep going, because there is good stuff later.  This book recommendation covers emotional preparation, but does cover coping with pain, and use of both pharmacological and nonpharmacological methods.    Guide to Childbirth by Savana Fuentes  Childbirth Without Fear by Kiara Campbell Read    Dr. Bonilla' The Pregnancy Book and The Birth Book--the pregnancy book goes month-by month    The Birth Partner by Mohini Zuñiga    Womanly Art of Breastfeeding by La Leche League Showroomprive   Bestfeeding by Josey Ybarra--great pictures    Mothering Your Nursing Toddler, by Felicia Villela.   Addresses dealing with so many of the challenging behaviors of a nursing toddler.  How Weaning Happens, by La Leche League.  Discusses weaning at all ages, from medically necessary weaning of an infant, all the way up to age 5 (or older), with why/why not, and strategies.  Very empowering book both for deciding to wean and deciding not to.    American College of Nurse-Midwives (ACNM) http://www.midwife.org/; look at the informational handouts at http://www.midwife.org/Share-With-Women     www.mymidwife.org    Mother to Baby (Medication and Herbal guidance in pregnancy): http://www.mothertobaby.org  Toll-Free Hotline: 349.213.8067  LactMed (Medication use while breastfeeding): http://toxnet.nlm.nih.gov/newtoxnet/lactmed.htm    Women's Health.gov:  http://www.womenshealth.gov/a-z-topics/index.html    American pregnancy association - http://americanpregnancy.org    Centering Pregnancy (group prenatal care option):  "http://centeringhealthcare.org    March of Dimes www.NUOFFER.[employer]     FDA - Nutrition  www.mypyramid.gov  Under \"For Consumers,\" click on \"pregnant and breastfeeding women.\"      Centers for Disease Control and Prevention (CDC) - Vaccines : http://www.cdc.gov/vaccines/       When researching information on the web, question the validity of websites.  The [facility] .gov, Hintsoft andMobiciousorg tend to be more reliable information.  If there are a lot of advertisements, be cautious of the information provided. Stay away from blogs and chat rooms please!     Baby Feeding in the Hospital: Information, Support and Resources    As you prepare for the birth of your child, you will want to consider options for feeding your baby including breast-feeding and/or baby formula. The American Academy of Pediatrics recommends exclusive breast-feeding for the first six months (although any amount of breast-feeding is beneficial).  However, we also understand that breast-feeding is a personal choice and not for everyone. Whether or not you choose to breast-feed, your decision will be respected by our staff.    There are numerous benefits of breast-feeding; here are a few to consider:  Provides antibodies to protect your baby from infections and diseases  The cost: formula can cost over $1,500 per year  Convenience, no warming up or sterilizing bottles and supplies  The physical contact with breastfeeding can make babies feel secure, warm and comforted    What ever my feeding choice, what can I expect after I deliver my baby?  Your baby will usually be placed skin-to-skin immediately following birth. The skin to skin contact between you and your baby will be a special and memorable time. The bonding and attachment comforts your baby and has a positive effect on baby s brain development.   Having your baby  room in  with you also helps you start to learn your baby s body rhythms and sleep cycle.    You will also begin to learn your baby s cues " (signals) that he or she is ready to feed.    When do I start to feed my baby?  As soon as possible after your baby s birth, you will be encouraged to begin feeding.  In the first couple of weeks, your baby will eat often.  Breastfeeding babies usually eat at least 8 times in 24 hours.  Babies fed formula usually eat at least 7 times in 24 hours.      Breast-feeding tips:  Get comfortable and use pillows for support.  Have your baby at the level of your breast, facing you,  tummy to tummy .    Touch your nipple to your baby s lips so you baby s mouth opens wide (rooting reflex).  Aim the nipple toward the roof of your baby s mouth. When your baby opens his or her mouth, pull your baby toward your breast to help your baby  latch on  to your nipple and much of the areola area.  Hand expressing your breast milk can assist with latching your baby to your breast, if needed.  Ask for help, breastfeeding may seem awkward or uncomfortable at first, this is normal. There are numerous resources available at [facility] Nurse [facility]), Clinics and beyond.   If your goal is to exclusively breastfeed, avoid using any formula or artificial nipples (including bottles and pacifiers) while you are your baby are learning to breastfeed unless there is a medical reason.     Mixing breastfeeding and formula can interfere with how you begin building your milk supply.  It can impact how you and your baby  learn  to breastfeeding together and alter the natural growth of  good  bacteria in your baby s stomach.  Delay a pacifier or a bottle in the first few weeks until breastfeeding is well established. This is often around 3 weeks of age.  Ask your nurse to show you how to hand express.   Breast milk can be kept in the refrigerator or freezer for later use.        Touring the Maternity Care Center  [facility] Maternity Care:    https://1jiajie.WomStreet/locations/the-birthplace-at--[facility] Maternity Care:   https://1jiajie.WomStreet/locations/the-birthplace-at[facility]  Scroll to the bottom of this page if the above link does not work      Hospital and Clinic Breastfeeding Resources:  -Schedule an appointment with a [facility] Nurse Midwives [facility]   CNM who is also a Lactation Consultant by calling [phone]     -Schedule a clinic appointment with a [facility] Nurse Midwives [facility] CNM with dedicated clinic hours for breastfeeding assistance by calling [phone]. Breastfeeding clinic visits are at [facility] on , [facility] on  and [facility] on .     New Parent Connection:   [facility], [address] [place] [address]  In-person meetings on  from 6 pm - 7:15 pm for parents of  to 9 months, at the same site.   All are free, drop-in, no registration required.    There are also free virtual meetings ongoing on :  11:30 am - 12:30 pm for parents of newborns to 3 months  4:15 pm to 5:15 pm for parents of 3 to 9-month olds  For joining info parents should call Edith Burns at [phone]      [facility] Baby Café  More information  Rosi Rubio  [phone]  [email or web address].     -Attend a baby weigh in at [facility].  Lactation consultants are available to answer questions  Elissa:  1:00 - 2:00  [facility]:  1:00 - 2:00  www.[facility]ension Borg[facility] Lee HospitalDeck Works.coer.com    -Attend one of the New Mama groups at [facility] in [facility].  [facility] also offers one-on-one in home and in office lactation consults.   www.[facility].tweetTV    -Attend a LeLeche League meeting.  Multiple groups in several locations throughout the [facility]. The meetings are no-cost and always informative breastfeeding education session through  "Internatal La Leche League  Www.Altobeam.org/    -Medication use while breastfeeding: http://toxnet.nlm.nih.gov/newtoxnet/lactmed.htm     Online Resources:  Breastfeedingmadesimple.com  Llli.org (La Leche League)  Normalfed.com  WomenshBluffton Hospitalth.gov/breastfeeding  Workandpump.com    Breast-feeding Supplies & Pumps:  Talk to your insurance provider or WIC (Women, Infants and Children) to learn more about options available to you. Recent health insurance changes may include additional coverage for supplies and pumps.    Public Health:  Women, Infants and Children Nutrition program (WIC): provides breast-feeding support and education in addition to formal feeding moms. 365-ULD-1454 or http://www.health.[facility].us/divs/fh/wic    Family Health Home Visiting: [facility] Nurse home visits are available. Talk to your provider to see if you qualify. Most [facility] have a program available.    Additional Resources:  La Leche League is an international, nonprofit, nonsectarian organization offering information, education, and support to mothers who want to breast-feed their babies. Local groups offer phone help and monthly meetings. Visit HackerRank or Awarepoint and us the  Find local support  drop down menu or click on the  Resources  tab.    [place] Breastfeeding Resources: 3-830-294-BABY (2229) toll free    National Breastfeeding Help Line trained breastfeeding peer counselors can help answer common breast-feeding questions by phone. Monday-Friday: English/Malaysian  3-010- 873-2966 toll free, 1-872.263.8627 (TTY)    Virtual Breastfeeding Support:    During this time of isolation, breastfeeding families need even more community!  Here are some area organizations offering virtual support groups for breastfeeding:    Lat Cafe Support Group, Tuesdays at 10:30 am   Run by OMAR Prather of The Baby Whisperer Lactation Consultants   Go to The Baby Whisperer Lactation Consultants Facebook page and click on \"events\" " for link   https://www.Rock-It Cargo.com/events/035524367618675/  [facility] Milk Hour,  at 2:30 pm    Run by OMAR Avina   Go to [facility] FB page and send message to get link   https://www.Rock-It Cargo.com/healthfoundations/  [facility]/[place] holding virtual meetings the first Tuesday of each month, 8-9 pm, and the   Third Saturday, 10 - 11 am.  Go to [facility] and [place] FB page; message to get link https://www.Rock-It Cargo.IntroBridge/LLLofGoldenValmelani/?hc_location=[facility]  Julisa offers a Lactation Lounge every Friday 12pm - 1pm, run by Yessy VarelaNorthwest Hospital Herbert Leader   Sign up via link at https://www.MovieSet/cbe-lactation  [facility] is offering virtual support groups every Monday, 10:30 am - 12 pm, run by nurse OMAR   Https://www.Rock-It Cargo.com/events/543705006587218/    Prenatal Breastfeeding Classes:      Julisa is offering virtual breastfeeding and  care classes:  https://www.MovieSet/education-workshops  BirthEd childbirth and breastfeeding education offering virtual prenatal breastfeeding classes  https://www.MoneyHero.com.hkmn.com/workshops

## 2025-02-16 NOTE — PROGRESS NOTES
Adrien presents to [facility] clinic for a routine prenatal visit at 25w6d. Feeling good!  Tums work for heartburn  Here for her 1 hr. GCT, hgb today.   Reports normal fetal movements. Discussed [facility]. Denies PTL including, regular painful contractions, bleeding, leaking or changes in fetal movement.   Reviewed  labor precautions, warning signs and when/how to call the on-call CNM.  Hemorrhoids?bleeding? Has GI referrral--resolved with witchhazel/Tucks wipes  Disc PP MMR  Offers no questions or concerns. .     Reviewed  labor precautions, warning signs and when/how to call the on-call CNM.   Reviewed recommendation for Tdap for fetal protection of pertussis, given after 27w0d.     [place] accepts Tdap for fetal protection of pertussis 27-36 wks.  [place] RPR? (May decline)  [place]: 4 wks if labs are normal

## 2025-02-19 ENCOUNTER — PRENATAL OFFICE VISIT (OUTPATIENT)
Dept: MIDWIFE SERVICES | Facility: CLINIC | Age: 35
End: 2025-02-19
Payer: COMMERCIAL

## 2025-02-19 VITALS
WEIGHT: 218.4 LBS | HEIGHT: 63 IN | DIASTOLIC BLOOD PRESSURE: 60 MMHG | SYSTOLIC BLOOD PRESSURE: 98 MMHG | BODY MASS INDEX: 38.7 KG/M2 | HEART RATE: 84 BPM

## 2025-02-19 DIAGNOSIS — O09.899 RUBELLA NON-IMMUNE STATUS, ANTEPARTUM: ICD-10-CM

## 2025-02-19 DIAGNOSIS — Z28.39 RUBELLA NON-IMMUNE STATUS, ANTEPARTUM: ICD-10-CM

## 2025-02-19 DIAGNOSIS — O09.90 SUPERVISION OF HIGH RISK PREGNANCY, ANTEPARTUM: Primary | ICD-10-CM

## 2025-02-19 LAB
GLUCOSE 1H P 50 G GLC PO SERPL-MCNC: 106 MG/DL (ref 70–129)
HGB BLD-MCNC: 12 G/DL (ref 11.7–15.7)
HOLD SPECIMEN: NORMAL

## 2025-02-19 PROCEDURE — 82950 GLUCOSE TEST: CPT | Performed by: MIDWIFE

## 2025-02-19 PROCEDURE — 85018 HEMOGLOBIN: CPT | Performed by: MIDWIFE

## 2025-02-19 PROCEDURE — 36415 COLL VENOUS BLD VENIPUNCTURE: CPT | Performed by: MIDWIFE

## 2025-02-19 PROCEDURE — 99207 PR PRENATAL VISIT: CPT | Performed by: MIDWIFE

## 2025-03-04 ENCOUNTER — HOSPITAL ENCOUNTER (OUTPATIENT)
Facility: HOSPITAL | Age: 35
End: 2025-03-04
Admitting: ADVANCED PRACTICE MIDWIFE
Payer: COMMERCIAL

## 2025-03-04 ENCOUNTER — HOSPITAL ENCOUNTER (OUTPATIENT)
Facility: HOSPITAL | Age: 35
Discharge: HOME | End: 2025-03-04
Attending: ADVANCED PRACTICE MIDWIFE | Admitting: ADVANCED PRACTICE MIDWIFE
Payer: COMMERCIAL

## 2025-03-04 VITALS — TEMPERATURE: 98.2 F | SYSTOLIC BLOOD PRESSURE: 108 MMHG | DIASTOLIC BLOOD PRESSURE: 69 MMHG | RESPIRATION RATE: 16 BRPM

## 2025-03-04 DIAGNOSIS — Z28.39 RUBELLA NON-IMMUNE STATUS, ANTEPARTUM: ICD-10-CM

## 2025-03-04 DIAGNOSIS — O09.90 SUPERVISION OF HIGH RISK PREGNANCY, ANTEPARTUM: Primary | ICD-10-CM

## 2025-03-04 DIAGNOSIS — O09.899 RUBELLA NON-IMMUNE STATUS, ANTEPARTUM: ICD-10-CM

## 2025-03-04 PROBLEM — E66.812 CLASS 2 SEVERE OBESITY WITH SERIOUS COMORBIDITY AND BODY MASS INDEX (BMI) OF 35.0 TO 35.9 IN ADULT (H): Status: ACTIVE | Noted: 2018-05-29

## 2025-03-04 PROBLEM — Z36.89 ENCOUNTER FOR TRIAGE IN PREGNANT PATIENT: Status: ACTIVE | Noted: 2025-03-04

## 2025-03-04 PROBLEM — E66.01 CLASS 2 SEVERE OBESITY WITH SERIOUS COMORBIDITY AND BODY MASS INDEX (BMI) OF 35.0 TO 35.9 IN ADULT (H): Status: ACTIVE | Noted: 2018-05-29

## 2025-03-04 PROBLEM — O36.8190 DECREASED FETAL MOVEMENT: Status: ACTIVE | Noted: 2025-03-04

## 2025-03-04 PROBLEM — E28.2 PCOS (POLYCYSTIC OVARIAN SYNDROME): Status: ACTIVE | Noted: 2018-05-29

## 2025-03-04 PROCEDURE — G0463 HOSPITAL OUTPT CLINIC VISIT: HCPCS

## 2025-03-04 PROCEDURE — 99213 OFFICE O/P EST LOW 20 MIN: CPT | Performed by: ADVANCED PRACTICE MIDWIFE

## 2025-03-04 RX ORDER — LIDOCAINE 40 MG/G
CREAM TOPICAL
Status: DISCONTINUED | OUTPATIENT
Start: 2025-03-04 | End: 2025-03-04 | Stop reason: HOSPADM

## 2025-03-04 ASSESSMENT — ACTIVITIES OF DAILY LIVING (ADL)
ADLS_ACUITY_SCORE: 15
ADLS_ACUITY_SCORE: 15

## 2025-03-04 NOTE — PROGRESS NOTES
"Patient Name:  Adrien Mcelroy  :      1990  MRN:      [id number]      Assessment:  1.  34-year-old  1 para 0 with IUP at 27+5 weeks  2.  Decreased fetal movement, RESOLVED  3.  REACTIVE NST/fetal heart rate category 1, appropriate for EGA    Plan:   -Vital signs  -NST  -Discharge to home undelivered.   -Reviewed warning signs including decreased fetal movement, leaking of fluid, vaginal bleeding, or signs of labor.   -Reviewed how to contact on-call CNM.   -Follow-up in clinic with CNM as scheduled on 3/19/2025 or sooner as needed.   -All questions answered.   -Agrees with plan.     Subjective:  Adrien Mcelroy is a 34 year old G 1 P 0 at 27+5 weeks, with an EDC of 2025 who presented to [facility] for evaluation of decreased fetal movement.  Patient states she has felt less movement since  night (2025).  She describes baby's movements normally as punching and kicking, but fetal movements have been perceived more like \"flutters\" for the last 24 to 36 hours.  Denies headache, visual disturbances, right upper quadrant abdominal pain, regular uterine contractions, leaking of fluid, or vaginal bleeding.     Objective:  Vital signs: /69   Temp 98.2  F (36.8  C) (Oral)   Resp 16   LMP 2024 (Exact Date)     FHR: 135, accelerations present, decelerations absent    Uterine contractions: None appreciated    Physical Exam:   Abdomen: SIUP, abdomen non-tender.  Placenta is POSTERIOR on 20-week fetal anatomy survey on 1/10/2025  SVE: Deferred, not iman  Sterile speculum exam: Not done   Legs: No edema, moves all freely    Temp:  [98.2  F (36.8  C)] 98.2  F (36.8  C)  Resp:  [16] 16  BP: (108)/(69) 108/69  No intake or output data in the 24 hours ending 25 0517    Results:  N/A    Provider: CROW Gupta, MICHELLE    25 minutes on the date of the encounter doing chart review, review of test results, interpretation of tests, patient visit, documentation, and discussion with " family

## 2025-03-04 NOTE — PROGRESS NOTES
Data: [facility] presented to [facility]: 3/4/2025  4:29 AM.  Reason for maternal/fetal assessment is decreased fetal movement. Patient reports decreased fetal movement since . She states that she used to feel kicks but recenty has just felt flutters.. Patient denies uterine contractions, leaking of vaginal fluid/rupture of membranes, vaginal bleeding, abdominal pain, pelvic pressure, nausea, vomiting, headache, visual disturbances, epigastric or RUQ pain, significant edema. Patient reports fetal movement is normal. Patient is a 27w5d .  Prenatal record reviewed. Pregnancy has been uncomplicated.    Vital signs wnl. Support person is present.     Action: Verbal consent for EFM. Triage assessment completed.     Response: Patient verbalized agreement with plan. EFM applied. Fetal movement noted. Pt states that she can feel movement and kicks. Reactive NST obtained. Pooja Nathan CNM in department and met with pt. Order given to discharge pt if NST OK.

## 2025-03-13 NOTE — PROGRESS NOTES
Adrien is here for a routine prenatal visit. Reports normal fetal movements. Denies regular painful contractions, bleeding, leaking, changes in fetal movement.  Reporting hip pain with sleeping.  Symptom relief reviewed. Reviewed normal hemoglobin and GCT from last visit. Recommended RPR and Tdap today. Pt accepts today.  CBE education encouraged, local resources discussed. Reviewed counseling on  labor precautions, warning signs and symptoms, and when/how to contact the on-call CNM. RTC 2 wks.

## 2025-03-13 NOTE — PATIENT INSTRUCTIONS
"\"We hope you had a positive experience and that you can definitely recommend [facility] to your family and friends. You ll be receiving a survey soon and we look forward to hearing your feedback\".    [facility]  - Contact information:  Appointment line and to get a hold of CNM in clinic Monday-Friday 8 am - 5 pm:  [phone].  There are some clinics with early start times (1st appointment 7:40 am) and others with evening hours (last appointment 6:20 pm).  Most are typically open from 8 am to 5 pm.    CNM on call answering service: [phone].  Specify your hospital of choice and leave a brief message for CNM;  will then page CNM who is on call at your specified hospital and you should receive a call back with 15 minutes.  Be sure that your ringer is audible and that you can accept blocked calls so that we can get back in touch with you! This number should be reserved for urgent needs if during the day, before 8 am, after 5 pm, weekends, holidays.    Contact the on-call CNM with warning signs, such as:  vaginal bleeding   Vaginal discharge and itching or pain and burning during urination  Leg/calf pain or swelling on one side  severe abdominal pain  nausea and vomiting more than 4-5 times a day, or if you are unable to keep anything down  fever more than 100.4 degrees F.     ServiceRelatedhart  After each of your visits you are welcome to check [employer] for your visit summary including education and links to information relevant to your pregnancy and/or well woman care.   Find the \"Visits\" tab at the top of the page, you will see a list of recent visits and for each visit a for link for \"View After Visit Summary.\" View of your After Visit Summary will allow you to read our recommendations from your visit, review any education provided, and link to websites with useful information.   If you have any questions or difficulty navigating [employer], please feel free to " "contact us and we will do our best to direct you.  Meet the Midwives from [facility]  You are invited to an informational meet and greet with [facility] Certified Nurse-Midwives. Our free \"Meet the Midwives\" event is a great opportunity to learn about our midwives' philosophy and experience, the hospitals where we can assist with your birth, and answer questions you may have. Partners, friends, and family are welcome to attend. Currently, this is a virtual event.  Date  Last Thursday of every month at 7 pm.    Link to next (live) meeting  https://[email or web address].[email or web address]/meet-the-midwives  To Join by Telephone (audio only) Call:   [phone] Phone Conference ID: [phone] #      Touring the Maternity Care Center  At this time we are offering a virtual tour of the Maternity Care Centers at both [facility] and [facility]:   [facility] Maternity Care:   https://[email or web address].Wynlink/locations/the-birthplace-at--[facility] Maternity Care:   https://Gl[facility] Blendy.Wynlink/locations/[facility]place-[facility]    Scroll to the bottom of the page in this link if the above link does not work.      Breastfeeding and Birth Control  How do I decide what birth control method is best for me while I am breastfeeding?  Choosing a method of birth control is very personal. First, answer the following questions:     Do you want to have more children?   How much spacing between births do you want for your children?   Do you smoke or have you had any health problems, such as liver disease or a blood clot?  Talk about the answers to each of these questions with your health care provider to help you choose the best method for you.    Can I use breastfeeding as my birth control?  Using breastfeeding as your birth control (the lactational amenorrhea method) can be a good way to keep from getting pregnant in the first " months after the baby is born. Each time your baby nurses, your body releases a hormone called prolactin, which stops your body from making the hormones that cause you to ovulate (release an egg). If you are not ovulating, you cannot get pregnant.    The lactational amenorrhea method works only if:   you have not started your period yet.   you are breastfeeding only and not giving your baby any other food or drink.   you are breastfeeding at least every 4 hours during the day and every 6 hours at night.   your baby is less than 6 months old.  When any 1 of these 4 things is not happening, you no longer have good protection from getting pregnant, and you should use another form of birth control.    What birth control methods are safe for me to use while I breastfeed?    Methods without hormones  Methods without hormones do not affect you, your baby, or your breastfeeding.  Methods without hormones that are the most effective   The copper intrauterine contraceptive device (IUD) (ParaGard) is a small, T-shaped device that is in- serted into your uterus (womb) through the vagina and cervix. The copper IUD lasts for 10 years.   Sterilization (getting your tubes tied or your partner having a vasectomy) is very effective, but it is per- manent. You should choose sterilization only if you do not want to have more children.  A method without hormones that is effective   The lactational amenorrhea method described above is effective for the first 6 months.  Methods without hormones that are less effective   Natural family planning is monitoring your body for signs of ovulation and not having sex when you think you are ovulating. This method is reliable only if you are having regular periods every month.   Barrier methods (condoms, diaphragms, sponges, and spermicides) are used at the time you have sex. These methods are effective only if you use them correctly every time.    Methods with hormones  Birth control methods that  use hormones can be used while you are breastfeeding. They may have a small effect on lowering the amount of milk you make. All hormones will get into your breast milk in very small amounts, but there is no known harm to your baby from this small amount of hormone in breast milk.only methodsmethods use only 1 hormone, called progestin. You can start them right after your baby is born or wait 4 to 6 weeks to make sure your milk supply is good.   Progestin-only pills ( minipills ): If you like to take pills every day, you can use the minipill. In order forpill to work well, you have to take 1 at the same time each day. When you stop breastfeeding, you should start pills that have both estrogen and progestin because they are better at keeping you from get- ting pregnant.   Progestin IUD (Mirena): The progestin IUD is shaped and inserted into the uterus like the copper IUD. It works for up to 5 years. Both IUDs are usually inserted 4 to 6 weeks after the baby is born.  Progestin implant (Nexplanon): The progestin implant is a small matchstick-sized flexible norah. It is placed into the fatty tissue in the back of your arm. It works for up to 3 years.  Progestin shot (Depo-Provera): The progestin shot is given every 3 months.    estrogen and progestin methods  These methods use 2 hormones, called estrogen and progestin.     These methods increase your risk of a blood clot, which is already higher than normal after you have a baby. You should not use them until your baby is at least 6 weeks old. The combined methods are not recommended as the first choice for women who are breastfeeding. If a combined method is the one that you feel will be best for you to prevent getting pregnant, these methods are okay to use while breastfeeding.   Combined birth control pills: You take a pill each day.  Vaginal ring (NuvaRing): The ring is worn in the vagina for 3 weeks then left out for 1 week before youin a new ring.  Patch (Ortho  Yamil): The patch is placed on your skin and changed every week for 3 weeks then left off forweek before putting a new patch on a different area of your skin.    Pediatric Care Providers at [facility]:    Choosing the right provider is one of the most important decisions you ll make about your health care. We can help you find the right one. Remember, you re looking for a provider you can trust and work with to improve your health and well-being, so take time to think about what you need. Depending on how complicated your health care needs are, you may need to see more than one type of provider.    Primary Care Providers: You ll see a primary care provider first for most health issues. They ll work with you to get your recommended screenings, help you manage chronic conditions, and refer you to other types of providers if you need them. Your primary care provider may be called a family physician or doctor, internist, general practitioner, nurse practitioner, or physician s assistant. Your child or teenager s provider may be called a pediatrician.  Specialists: You ll see a specialist for certain services or to treat specific conditions. Specialists include cardiologists, oncologists, psychologists, allergists, podiatrists, and orthopedists. You may need a referral from your primary care provider before you go to a specialist in order for your health plan to pay for your visit.\Cadenere are some tips for finding a provider where you live:  If you already have a provider you like and want to keep working with, call their office and ask if they accept your coverage.  Call your insurance company or state Medicaid and CHIP program. Look at their website or check your member handbook to find providers in your network who take your health coverage.  Ask your friends or family if they have providers they like and use these tools to compare health care providers in your area.    Family Medicine at  [facility]:      https://[email or web address]/specialties/family-medicine    Many of our families enjoy all seeing the same doctor, who comes to know the whole family very well. We base our practice on the knowledge of the patient in the context of family and community.  WHY CHOOSE A FAMILY MEDICINE PHYSICIAN?  Ability of the whole family to see the same doctor  Focus on the whole person, including physical and emotional health  A personal relationship with their doctor that is nurtured over time  Respect for individual and family beliefs and values  No need to change primary care providers when a certain age is reached  Coordination of care when other health care services are needed    Pediatrics at  [facility]:   [email or web address]://[email or web address]/[email or web address]/pediatric-care    Through a teaching affiliation with the [facility], [facility] staff keeps current on new developments in the field of pediatrics.     Everything we do centers around caring for children. We place special emphasis on wellness and prevention.pediatric care team includes a team of pediatricians and certified nurse practitioners who provide care to pediatric and adolescent patients ages 0 to 18, and some up to the age of 26. We offer preventive health maintenance for healthy children as well the diagnosis and treatment of common and chronic illnesses and injuries. In addition, we also offer several pediatric specialists who focus on adolescent health issues and developmental and behavioral issues.    Circumcision    Educational video:     [email or web address]    For More Information  American Academy of Family Physicians: Circumcision  http://familydoctor.org/familydoctor/en/pregnancy-newborns/caring-for-newborns/infant- care/circumcision.html  MedlinePlus: Circumcision (includes a slide show on the procedure)  www.nlm.nih.gov/medlineplus/circumcision.html  American Academy of Pediatrics:  Policy statement on circumcision  Http://pediatrics.aappublications.org/content/130/3/e756.abstract      Childbirth and Parenting Education:     Everyday Miracles:   https://www.everyday-miracles.org/    Free Video Series from [facility]: https://nursing.[facility]/academics/specialty-areas/nurse-midwifery/having-baby-prenatal-videos/having-baby-prenatal-and    Childbirth Education virtual (live) classes: www.Simplicita Software/classes  The Birth Hour: https://FanTrail/online-childbirth-class/  BirthED: https://www.birthedmn.com/  CATHERINE parenting center: http://TuTanda/   (238) 128-RCTX  Blooma: (education, yoga & wellness) www.7billionideas  Enlightened Mama: www.enlightenedmama.Independa   Childbirth collective: (Parent topic nights)  www.childbirthcollective.org/  Hypnobabies:  www.hypnobabiestIngogo.Independa/  Hypnobirthing:  Http://Maxpanda Saa[employer] Software.Independa/  Hypnobirthing virtual class: www.Spectafy/hypnobirthing    Information about doulas:  Childbirth collective: http://www.childbirthcollective.org/  Doulas of [place] (MONALISA):  www.monalisa.org  [facility]  project: http://twincitiesdoulaproject.com/     Early Childhood and Family Education (ECFE):  ECFE offers parents hands-on learning experiences that will nourish a lifetime of teachable moments.  http://ecfe.info/ecfe-home/    APPS and Podcasts:   Cody Moore Nurture    Evidence Based Birth  The Birth Hour (for birth stories)   Birthful   Expectful   The Longest Shortest Time  PregnancyPodcsean Dalton  https://www.downtobirthshow.com/    Book Recommendations:   Phyllis [place]'s Birthing From Within--first few chapters include a new-age tone, you may prefer to skip it and keep going, because there is good stuff later.  This book recommendation covers emotional preparation, but does cover coping with pain, and use of both pharmacological and nonpharmacological methods.    Guide to Childbirth by Savana May  "Alfredo  Childbirth Without Fear by Kiara Campbell Read    Dr. Bonilla' The Pregnancy Book and The Birth Book--the pregnancy book goes month-by month    The Birth Partner by Mohini Zuñiga    Womanly Art of Breastfeeding by La Leche League International   Bestfeeding by Josey Ybarra--great pictures    Mothering Your Nursing Toddler, by Felicia Villela.   Addresses dealing with so many of the challenging behaviors of a nursing toddler.  How Weaning Happens, by La Leche League.  Discusses weaning at all ages, from medically necessary weaning of an infant, all the way up to age 5 (or older), with why/why not, and strategies.  Very empowering book both for deciding to wean and deciding not to.    American College of Nurse-Midwives (ACNM) http://www.midwife.org/; look at the informational handouts at http://www.midwife.org/Share-With-Women     www.mymidwife.org    Mother to Baby (Medication and Herbal guidance in pregnancy): http://www.mothertobaby.org  Toll-Free Hotline: 253.690.4712  LactMed (Medication use while breastfeeding): http://toxnet.nlm.nih.gov/newtoxnet/lactmed.htm    Women's Health.gov:  http://www.womenshealth.gov/a-z-topics/index.html    American pregnancy association - http://americanpregnancy.org    Centering Pregnancy (group prenatal care option): http://centeringhealthcare.org    March of Dimes www.Fashion.me.com     FDA - Nutrition  www.mypyramid.gov  Under \"For Consumers,\" click on \"pregnant and breastfeeding women.\"      Centers for Disease Control and Prevention (CDC) - Vaccines : http://www.cdc.gov/vaccines/       When researching information on the web, question the validity of websites.  The domains .gov, .edu and.org tend to be more reliable information.  If there are a lot of advertisements, be cautious of the information provided. Stay away from blogs and chat rooms please!     Virtual Breastfeeding Support:    During this time of isolation, breastfeeding families need even more community! " " Here are some area organizations offering virtual support groups for breastfeeding:    [employer] Support Group,  at 10:30 am   Run by OMAR Prather of [employer] [facility]   Go to [employer] Facebook page and click on \"events\" for link   https://www.facebook.com/events/899704568300330/  [facility] Milk Hour,  at 2:30 pm    Run by OMAR Avina   Go to [facility] FB page and send message to get link   https://www.Daily News Online.com/[facility] Hospitalundations/  [facility] holding virtual meetings the first Tuesday of each month, 8-9 pm, and the   Third Saturday, 10 - 11 am.  Go to [facility] and [place] FB page; message to get link https://www.Daily News Online.SimpleHoney/LLLofGoldLanie/?hc_location=[facility] offers a Lactation Lounge every Friday 12pm - 1pm, run by Juliet Purcell [facility] Leader   Sign up via link at https://www.[employer]/cbe-lactation  [facility] is offering virtual support groups every Monday, 10:30 am - 12 pm, run by nurse IBCLC   Https://www.facebook.com/events/473734022169129/    Prenatal Breastfeeding Classes:      [facility] is offering virtual breastfeeding and  care classes:  https://www.[employer]/education-workshops  BirthEd childbirth and breastfeeding education offering virtual prenatal breastfeeding classes  https://www.birthedmn.com/workshops    Breastfeeding clinic visits are at [facility] on , [facility] on  and [facility] on . Call to schedule, [phone].    New Parent Connection:   Paula Becerra, [id number] [address] [facility]scar[facility]cy  In-person meetings on  from 6 pm - 7:15 pm for parents of  to 9 months, at the same site.   All are free, drop-in, no registration required.    There are also free virtual meetings ongoing on " Tuesdays:  11:30 am - 12:30 pm for parents of newborns to 3 months  4:15 pm to 5:15 pm for parents of 3 to 9-month olds  For joining info parents should call Edith Burns at [phone]

## 2025-03-18 PROBLEM — O36.8190 DECREASED FETAL MOVEMENT: Status: RESOLVED | Noted: 2025-03-04 | Resolved: 2025-03-18

## 2025-03-18 PROBLEM — Z36.89 ENCOUNTER FOR TRIAGE IN PREGNANT PATIENT: Status: RESOLVED | Noted: 2025-03-04 | Resolved: 2025-03-18

## 2025-03-19 ENCOUNTER — PRENATAL OFFICE VISIT (OUTPATIENT)
Dept: MIDWIFE SERVICES | Facility: CLINIC | Age: 35
End: 2025-03-19
Payer: COMMERCIAL

## 2025-03-19 VITALS
HEIGHT: 63 IN | OXYGEN SATURATION: 97 % | BODY MASS INDEX: 39.25 KG/M2 | WEIGHT: 221.5 LBS | HEART RATE: 90 BPM | DIASTOLIC BLOOD PRESSURE: 60 MMHG | SYSTOLIC BLOOD PRESSURE: 100 MMHG

## 2025-03-19 DIAGNOSIS — Z28.39 RUBELLA NON-IMMUNE STATUS, ANTEPARTUM: ICD-10-CM

## 2025-03-19 DIAGNOSIS — O09.899 RUBELLA NON-IMMUNE STATUS, ANTEPARTUM: ICD-10-CM

## 2025-03-19 DIAGNOSIS — O09.90 SUPERVISION OF HIGH RISK PREGNANCY, ANTEPARTUM: Primary | ICD-10-CM

## 2025-03-19 LAB — T PALLIDUM AB SER QL: NONREACTIVE

## 2025-03-19 PROCEDURE — 0502F SUBSEQUENT PRENATAL CARE: CPT | Performed by: ADVANCED PRACTICE MIDWIFE

## 2025-03-19 PROCEDURE — 90715 TDAP VACCINE 7 YRS/> IM: CPT | Performed by: ADVANCED PRACTICE MIDWIFE

## 2025-03-19 PROCEDURE — 36415 COLL VENOUS BLD VENIPUNCTURE: CPT | Performed by: ADVANCED PRACTICE MIDWIFE

## 2025-03-19 PROCEDURE — 3078F DIAST BP <80 MM HG: CPT | Performed by: ADVANCED PRACTICE MIDWIFE

## 2025-03-19 PROCEDURE — 99207 PR PRENATAL VISIT: CPT | Performed by: ADVANCED PRACTICE MIDWIFE

## 2025-03-19 PROCEDURE — 90471 IMMUNIZATION ADMIN: CPT | Performed by: ADVANCED PRACTICE MIDWIFE

## 2025-03-19 PROCEDURE — 3074F SYST BP LT 130 MM HG: CPT | Performed by: ADVANCED PRACTICE MIDWIFE

## 2025-03-19 NOTE — NURSING NOTE
Prior to immunization administration, verified patients identity using patient s name and date of birth. Please see Immunization Activity for additional information.     Screening Questionnaire for Adult Immunization    Are you sick today?   No   Do you have allergies to medications, food, a vaccine component or latex?   Yes   Have you ever had a serious reaction after receiving a vaccination?   Yes  COVID-19   Do you have a long-term health problem with heart, lung, kidney, or metabolic disease (e.g., diabetes), asthma, a blood disorder, no spleen, complement component deficiency, a cochlear implant, or a spinal fluid leak?  Are you on long-term aspirin therapy?   No   Do you have cancer, leukemia, HIV/AIDS, or any other immune system problem?   No   Do you have a parent, brother, or sister with an immune system problem?   No   In the past 3 months, have you taken medications that affect  your immune system, such as prednisone, other steroids, or anticancer drugs; drugs for the treatment of rheumatoid arthritis, Crohn s disease, or psoriasis; or have you had radiation treatments?   No   Have you had a seizure, or a brain or other nervous system problem?   No   During the past year, have you received a transfusion of blood or blood    products, or been given immune (gamma) globulin or antiviral drug?   No   For women: Are you pregnant or is there a chance you could become       pregnant during the next month?   Yes  Pregnant    Have you received any vaccinations in the past 4 weeks?   No     Immunization questionnaire was positive for at least one answer.  Notified Caroline Villagomez CNM.      Patient instructed to remain in clinic for 15 minutes afterwards, and to report any adverse reactions.     Screening performed by Jeanie Montalvo LPN on 3/19/2025 at 1:25 PM.

## 2025-04-08 NOTE — PROGRESS NOTES
Adrien presents to [facility] clinic for a routine prenatal visit at 32w6d, here with mela Perdomo. Feeling well in general other than seasonal allergies and sore back.  Reports normal fetal movements. Discussed [facility]. Denies regular painful contractions, bleeding, leaking, changes in fetal movement.   Offers no questions or concerns.  Chiropractic care for sore back--helping  Rx for Breast pump today    32 week pregnancy checklist addressed.   CBE  Breastfeeding   Peds provider  PP supports lots of help, partner and siblings    Reviewed  labor precautions, warning signs and when/how to call the on-call CNM.     RTC in 2 wks

## 2025-04-09 ENCOUNTER — PRENATAL OFFICE VISIT (OUTPATIENT)
Dept: MIDWIFE SERVICES | Facility: CLINIC | Age: 35
End: 2025-04-09
Payer: COMMERCIAL

## 2025-04-09 VITALS
BODY MASS INDEX: 40.06 KG/M2 | SYSTOLIC BLOOD PRESSURE: 110 MMHG | OXYGEN SATURATION: 97 % | HEART RATE: 81 BPM | HEIGHT: 63 IN | WEIGHT: 226.1 LBS | DIASTOLIC BLOOD PRESSURE: 62 MMHG

## 2025-04-09 DIAGNOSIS — Z28.39 RUBELLA NON-IMMUNE STATUS, ANTEPARTUM: ICD-10-CM

## 2025-04-09 DIAGNOSIS — O09.90 SUPERVISION OF HIGH RISK PREGNANCY, ANTEPARTUM: Primary | ICD-10-CM

## 2025-04-09 DIAGNOSIS — O09.899 RUBELLA NON-IMMUNE STATUS, ANTEPARTUM: ICD-10-CM

## 2025-04-09 PROCEDURE — 3074F SYST BP LT 130 MM HG: CPT | Performed by: MIDWIFE

## 2025-04-09 PROCEDURE — 99207 PR PRENATAL VISIT: CPT | Performed by: MIDWIFE

## 2025-04-09 PROCEDURE — 0502F SUBSEQUENT PRENATAL CARE: CPT | Performed by: MIDWIFE

## 2025-04-09 PROCEDURE — 3078F DIAST BP <80 MM HG: CPT | Performed by: MIDWIFE

## 2025-04-21 NOTE — PROGRESS NOTES
"Adrien presents to [facility] clinic for a routine prenatal visit at 35w0d.  Lue here today as well.   Feeling well, just tired, sciatica but chiropractic helping a lot.  Reports normal fetal movements. Discussed [facility]. Denies regular painful contractions, bleeding, leaking, changes in fetal movement.   Offers no questions or concerns.    EPDS today: , \"never\" to #10.  Patient states no depression.  Handouts given regarding GBS, breast-feeding and postpartum depression/anxiety and wellbeing.       labor precautions and danger signs and symptoms reviewed.  All questions answered.  Encouraged daily fetal movement counting and to call or return to clinic with any questions, concerns, or as needed.    Plan GBS and hgb next visit. Also discussed cervical exam or bedside limited ultrasound to confirm fetal presentation.    "

## 2025-04-23 ENCOUNTER — PRENATAL OFFICE VISIT (OUTPATIENT)
Dept: MIDWIFE SERVICES | Facility: CLINIC | Age: 35
End: 2025-04-23
Payer: COMMERCIAL

## 2025-04-23 VITALS
SYSTOLIC BLOOD PRESSURE: 112 MMHG | DIASTOLIC BLOOD PRESSURE: 74 MMHG | HEIGHT: 63 IN | BODY MASS INDEX: 37.1 KG/M2 | WEIGHT: 209.4 LBS

## 2025-04-23 DIAGNOSIS — O09.899 RUBELLA NON-IMMUNE STATUS, ANTEPARTUM: ICD-10-CM

## 2025-04-23 DIAGNOSIS — Z28.39 RUBELLA NON-IMMUNE STATUS, ANTEPARTUM: ICD-10-CM

## 2025-04-23 DIAGNOSIS — O09.90 SUPERVISION OF HIGH RISK PREGNANCY, ANTEPARTUM: Primary | ICD-10-CM

## 2025-04-23 PROCEDURE — 3074F SYST BP LT 130 MM HG: CPT | Performed by: MIDWIFE

## 2025-04-23 PROCEDURE — 99207 PR PRENATAL VISIT: CPT | Performed by: MIDWIFE

## 2025-04-23 PROCEDURE — 3078F DIAST BP <80 MM HG: CPT | Performed by: MIDWIFE

## 2025-04-23 PROCEDURE — 0502F SUBSEQUENT PRENATAL CARE: CPT | Performed by: MIDWIFE

## 2025-04-23 ASSESSMENT — EDINBURGH POSTNATAL DEPRESSION SCALE (EPDS)
THE THOUGHT OF HARMING MYSELF HAS OCCURRED TO ME: NEVER
I HAVE BLAMED MYSELF UNNECESSARILY WHEN THINGS WENT WRONG: NOT VERY OFTEN
I HAVE FELT SCARED OR PANICKY FOR NO GOOD REASON: NO, NOT AT ALL
I HAVE BEEN SO UNHAPPY THAT I HAVE BEEN CRYING: ONLY OCCASIONALLY
I HAVE BEEN SO UNHAPPY THAT I HAVE HAD DIFFICULTY SLEEPING: NOT AT ALL
I HAVE FELT SAD OR MISERABLE: NOT VERY OFTEN
I HAVE LOOKED FORWARD WITH ENJOYMENT TO THINGS: AS MUCH AS I EVER DID
THINGS HAVE BEEN GETTING ON TOP OF ME: NO, MOST OF THE TIME I HAVE COPED QUITE WELL
I HAVE BEEN ABLE TO LAUGH AND SEE THE FUNNY SIDE OF THINGS: AS MUCH AS I ALWAYS COULD
TOTAL SCORE: 5
I HAVE BEEN ANXIOUS OR WORRIED FOR NO GOOD REASON: HARDLY EVER

## 2025-05-06 NOTE — PROGRESS NOTES
"Adrien presents to [facility] clinic for a routine prenatal visit at 36w 6d. Feeling well.  SROM? Gush at 1030 am today--had just emptied her bladder, then stepped on the scale and had a \"gush\"-- watery, dripped down her leg and made a pool of clear liquid on the floor. Cramping since. Baby active.   Reports normal fetal movements. Discussed [facility]. Denies regular painful contractions, bleeding, changes in fetal movement.     Questions about SROM--will do sterile spec with pt's consent.     GBS and hgb today. ROM Plus stat today Neg  NST reactive  BSUS  confirms cephalic presentation    Reviewed term labor precautions, warning signs and when/how to call the on-call CNM. Encouraged weekly visits until birth.      Addendum: neg ROM Plus, MyChart message sent. This CNM attempted to call but no answer, so left general message to call CNM on call if she has questions, reviewed phone number.  "

## 2025-05-07 ENCOUNTER — PRENATAL OFFICE VISIT (OUTPATIENT)
Dept: MIDWIFE SERVICES | Facility: CLINIC | Age: 35
End: 2025-05-07
Payer: COMMERCIAL

## 2025-05-07 ENCOUNTER — TELEPHONE (OUTPATIENT)
Dept: MIDWIFE SERVICES | Facility: CLINIC | Age: 35
End: 2025-05-07

## 2025-05-07 ENCOUNTER — RESULTS FOLLOW-UP (OUTPATIENT)
Dept: MIDWIFE SERVICES | Facility: CLINIC | Age: 35
End: 2025-05-07

## 2025-05-07 VITALS
OXYGEN SATURATION: 98 % | HEIGHT: 63 IN | BODY MASS INDEX: 40.22 KG/M2 | WEIGHT: 227 LBS | SYSTOLIC BLOOD PRESSURE: 114 MMHG | HEART RATE: 104 BPM | DIASTOLIC BLOOD PRESSURE: 72 MMHG

## 2025-05-07 DIAGNOSIS — O09.899 RUBELLA NON-IMMUNE STATUS, ANTEPARTUM: ICD-10-CM

## 2025-05-07 DIAGNOSIS — O42.90 AMNIOTIC FLUID LEAKING: ICD-10-CM

## 2025-05-07 DIAGNOSIS — Z28.39 RUBELLA NON-IMMUNE STATUS, ANTEPARTUM: ICD-10-CM

## 2025-05-07 DIAGNOSIS — O09.90 SUPERVISION OF HIGH RISK PREGNANCY, ANTEPARTUM: Primary | ICD-10-CM

## 2025-05-07 LAB
HGB BLD-MCNC: 11.4 G/DL (ref 11.7–15.7)
RUPTURE OF FETAL MEMBRANES BY ROM PLUS: NEGATIVE

## 2025-05-07 PROCEDURE — 85018 HEMOGLOBIN: CPT | Performed by: MIDWIFE

## 2025-05-07 PROCEDURE — 36415 COLL VENOUS BLD VENIPUNCTURE: CPT | Performed by: MIDWIFE

## 2025-05-07 PROCEDURE — 84112 EVAL AMNIOTIC FLUID PROTEIN: CPT | Performed by: MIDWIFE

## 2025-05-07 NOTE — TELEPHONE ENCOUNTER
"Phone call from Vi through the answering service. She suspects she experienced SROM around 1030 this morning. She was on the scale and felt \"a gush of fluid\" but thought it might be urine. She put on a pad and underwear and laid down for 30 minutes. When she got up she continued to feel a trickle of fluid. States fluid color is clear. No vaginal bleeding. Reports baby has been very active since suspected SROM. No contractions at this time.     Reviewed options of going to the [facility] for assessment or to her clinic visit at 1245. Her preference is to be seen in clinic. Epic message sent \"high priority\" to Lynn Mann CNM regarding situation with additional text message telling her there is an Epic message. GBS has not yet been drawn as patient is 36+6 today.     Ashley Blackburn CNM    "

## 2025-05-08 LAB — GP B STREP DNA SPEC QL NAA+PROBE: NEGATIVE

## 2025-05-09 NOTE — PROGRESS NOTES
Adrien presents to [facility] for a routine prenatal visit at 37w 6d. Here with  Conor.   Feeling well, tired.  Discussed [facility].  Asked about when to end low-dose aspirin--because she is taking this for having risk factors for preeclampsia, she can quit at delivery if no elevated blood pressures.    Feeling excited about upcoming labor/birth. So done being pregnant, uncomfortable, not sleeping well--waking a lot to use the bathroom. Naps ok.    GBS neg, Hgb 11.4, (ROM Plus negative)     Reviewed term labor precautions, warning signs and when/how to call the on-call CNM. Encouraged weekly visits until birth.

## 2025-05-14 ENCOUNTER — PRENATAL OFFICE VISIT (OUTPATIENT)
Dept: MIDWIFE SERVICES | Facility: CLINIC | Age: 35
End: 2025-05-14
Payer: COMMERCIAL

## 2025-05-14 VITALS
WEIGHT: 231 LBS | OXYGEN SATURATION: 96 % | DIASTOLIC BLOOD PRESSURE: 64 MMHG | HEART RATE: 84 BPM | SYSTOLIC BLOOD PRESSURE: 102 MMHG | BODY MASS INDEX: 40.92 KG/M2

## 2025-05-14 DIAGNOSIS — O09.899 RUBELLA NON-IMMUNE STATUS, ANTEPARTUM: ICD-10-CM

## 2025-05-14 DIAGNOSIS — Z28.39 RUBELLA NON-IMMUNE STATUS, ANTEPARTUM: ICD-10-CM

## 2025-05-14 DIAGNOSIS — O09.90 SUPERVISION OF HIGH RISK PREGNANCY, ANTEPARTUM: Primary | ICD-10-CM

## 2025-05-14 PROCEDURE — 0502F SUBSEQUENT PRENATAL CARE: CPT | Performed by: MIDWIFE

## 2025-05-14 PROCEDURE — 99207 PR PRENATAL VISIT: CPT | Performed by: MIDWIFE

## 2025-05-14 PROCEDURE — 3078F DIAST BP <80 MM HG: CPT | Performed by: MIDWIFE

## 2025-05-14 PROCEDURE — 3074F SYST BP LT 130 MM HG: CPT | Performed by: MIDWIFE

## 2025-05-22 ENCOUNTER — PRENATAL OFFICE VISIT (OUTPATIENT)
Dept: MIDWIFE SERVICES | Facility: CLINIC | Age: 35
End: 2025-05-22
Payer: COMMERCIAL

## 2025-05-22 VITALS
HEIGHT: 63 IN | DIASTOLIC BLOOD PRESSURE: 66 MMHG | SYSTOLIC BLOOD PRESSURE: 100 MMHG | BODY MASS INDEX: 41.37 KG/M2 | WEIGHT: 233.5 LBS | HEART RATE: 80 BPM

## 2025-05-22 DIAGNOSIS — E66.09 CLASS 1 OBESITY DUE TO EXCESS CALORIES WITHOUT SERIOUS COMORBIDITY WITH BODY MASS INDEX (BMI) OF 34.0 TO 34.9 IN ADULT: ICD-10-CM

## 2025-05-22 DIAGNOSIS — E66.811 CLASS 1 OBESITY DUE TO EXCESS CALORIES WITHOUT SERIOUS COMORBIDITY WITH BODY MASS INDEX (BMI) OF 34.0 TO 34.9 IN ADULT: ICD-10-CM

## 2025-05-22 DIAGNOSIS — O09.90 SUPERVISION OF HIGH RISK PREGNANCY, ANTEPARTUM: Primary | ICD-10-CM

## 2025-05-22 DIAGNOSIS — O09.899 RUBELLA NON-IMMUNE STATUS, ANTEPARTUM: ICD-10-CM

## 2025-05-22 DIAGNOSIS — Z28.39 RUBELLA NON-IMMUNE STATUS, ANTEPARTUM: ICD-10-CM

## 2025-05-22 DIAGNOSIS — O26.03 EXCESSIVE WEIGHT GAIN IN PREGNANCY IN THIRD TRIMESTER: ICD-10-CM

## 2025-05-22 NOTE — PROGRESS NOTES
Adrien presents to the clinic with her  Conor.  Feeling well.  Her grandmother passed away, and the  will be held over 3 days, this coming , Monday and Tuesday.  It is a traditional ong .  She explains that she is hopeful to remain pregnant until next week because of this event.  Baby is active, and she denies signs and symptoms of both term labor and preeclampsia.  Patient has a pregravid BMI of 34 with a 40 pound weight gain, more than expected.  Term labor precautions and danger signs and symptoms reviewed.  All questions answered.  Encouraged daily fetal movement counting and to call or return to clinic with any questions, concerns, or as needed.

## 2025-05-27 ENCOUNTER — TELEPHONE (OUTPATIENT)
Dept: MIDWIFE SERVICES | Facility: CLINIC | Age: 35
End: 2025-05-27
Payer: COMMERCIAL

## 2025-05-27 DIAGNOSIS — O09.90 SUPERVISION OF HIGH RISK PREGNANCY, ANTEPARTUM: Primary | ICD-10-CM

## 2025-05-27 DIAGNOSIS — K64.9 HEMORRHOIDS, UNSPECIFIED HEMORRHOID TYPE: ICD-10-CM

## 2025-05-27 DIAGNOSIS — Z28.39 RUBELLA NON-IMMUNE STATUS, ANTEPARTUM: ICD-10-CM

## 2025-05-27 DIAGNOSIS — O09.899 RUBELLA NON-IMMUNE STATUS, ANTEPARTUM: ICD-10-CM

## 2025-05-28 NOTE — TELEPHONE ENCOUNTER
"Phone Call:    Adrien Mcelroy, is a 34 year old,  at 39w5d, calls reporting \"I think I am in labor\". Contractions started earlier today and have been off and on all day. About two hours ago, they started coming more consistently, and are occurring q 6 mn. They are mild in intensity, able to talk through. Relieving measures at home include squatting, walking, shower. Thinks she lost her mucous plug this morning.     Denies leaking of fluid, vaginal bleeding, or changes in fetal movements.     Disc option for eval at [facility] now vs expectant management at home. Pt plans an unmedicated birth and is interested in labor at home for now. Anticipatory guidance given for early vs active labor, comfort measures for early labor at home including warm bath/shower, resting if possible between contractions, eating/drinking as desired. Encouraged to call back and plan eval at the [facility] with an increase in contraction intensity and frequency, q4 mn or with warning signs as reviewed. All questions answered, agrees with plan.     CROW Pineda, CNM  [facility]  Midwifery     "

## 2025-05-29 ENCOUNTER — HOSPITAL ENCOUNTER (INPATIENT)
Facility: HOSPITAL | Age: 35
End: 2025-05-29
Attending: MIDWIFE | Admitting: MIDWIFE
Payer: COMMERCIAL

## 2025-05-29 VITALS
OXYGEN SATURATION: 100 % | WEIGHT: 230 LBS | DIASTOLIC BLOOD PRESSURE: 60 MMHG | TEMPERATURE: 98 F | BODY MASS INDEX: 40.75 KG/M2 | HEIGHT: 63 IN | RESPIRATION RATE: 18 BRPM | SYSTOLIC BLOOD PRESSURE: 102 MMHG

## 2025-05-29 DIAGNOSIS — Z28.39 RUBELLA NON-IMMUNE STATUS, ANTEPARTUM: ICD-10-CM

## 2025-05-29 DIAGNOSIS — O09.899 RUBELLA NON-IMMUNE STATUS, ANTEPARTUM: ICD-10-CM

## 2025-05-29 DIAGNOSIS — O09.90 SUPERVISION OF HIGH RISK PREGNANCY, ANTEPARTUM: Primary | ICD-10-CM

## 2025-05-29 PROBLEM — Z37.9 NORMAL LABOR: Status: ACTIVE | Noted: 2025-05-29

## 2025-05-29 PROBLEM — O36.8390 FETAL HEART RATE DECELERATIONS AFFECTING MANAGEMENT OF MOTHER: Status: ACTIVE | Noted: 2025-05-29

## 2025-05-29 LAB
ABO + RH BLD: NORMAL
BLD GP AB SCN SERPL QL: NEGATIVE
ERYTHROCYTE [DISTWIDTH] IN BLOOD BY AUTOMATED COUNT: 15.6 % (ref 10–15)
HCT VFR BLD AUTO: 33.9 % (ref 35–47)
HGB BLD-MCNC: 10.6 G/DL (ref 11.7–15.7)
MCH RBC QN AUTO: 24.4 PG (ref 26.5–33)
MCHC RBC AUTO-ENTMCNC: 31.3 G/DL (ref 31.5–36.5)
MCV RBC AUTO: 78 FL (ref 78–100)
PLATELET # BLD AUTO: 329 10E3/UL (ref 150–450)
RBC # BLD AUTO: 4.35 10E6/UL (ref 3.8–5.2)
SPECIMEN EXP DATE BLD: NORMAL
T PALLIDUM AB SER QL: NONREACTIVE
WBC # BLD AUTO: 11.8 10E3/UL (ref 4–11)

## 2025-05-29 PROCEDURE — 272N000001 HC OR GENERAL SUPPLY STERILE: Performed by: OBSTETRICS & GYNECOLOGY

## 2025-05-29 PROCEDURE — 36415 COLL VENOUS BLD VENIPUNCTURE: CPT | Performed by: MIDWIFE

## 2025-05-29 PROCEDURE — 10907ZC DRAINAGE OF AMNIOTIC FLUID, THERAPEUTIC FROM PRODUCTS OF CONCEPTION, VIA NATURAL OR ARTIFICIAL OPENING: ICD-10-PCS | Performed by: OBSTETRICS & GYNECOLOGY

## 2025-05-29 PROCEDURE — 370N000017 HC ANESTHESIA TECHNICAL FEE, PER MIN: Performed by: OBSTETRICS & GYNECOLOGY

## 2025-05-29 PROCEDURE — 120N000001 HC R&B MED SURG/OB

## 2025-05-29 PROCEDURE — 86780 TREPONEMA PALLIDUM: CPT | Performed by: MIDWIFE

## 2025-05-29 PROCEDURE — 360N000076 HC SURGERY LEVEL 3, PER MIN: Performed by: OBSTETRICS & GYNECOLOGY

## 2025-05-29 PROCEDURE — 250N000011 HC RX IP 250 OP 636: Performed by: PAIN MEDICINE

## 2025-05-29 PROCEDURE — 710N000010 HC RECOVERY PHASE 1, LEVEL 2, PER MIN: Performed by: OBSTETRICS & GYNECOLOGY

## 2025-05-29 PROCEDURE — 86850 RBC ANTIBODY SCREEN: CPT | Performed by: MIDWIFE

## 2025-05-29 PROCEDURE — 85027 COMPLETE CBC AUTOMATED: CPT | Performed by: MIDWIFE

## 2025-05-29 PROCEDURE — 99418 PROLNG IP/OBS E/M EA 15 MIN: CPT | Performed by: ADVANCED PRACTICE MIDWIFE

## 2025-05-29 PROCEDURE — 258N000003 HC RX IP 258 OP 636: Performed by: MIDWIFE

## 2025-05-29 PROCEDURE — 86901 BLOOD TYPING SEROLOGIC RH(D): CPT | Performed by: MIDWIFE

## 2025-05-29 PROCEDURE — 250N000009 HC RX 250: Performed by: ADVANCED PRACTICE MIDWIFE

## 2025-05-29 PROCEDURE — 99223 1ST HOSP IP/OBS HIGH 75: CPT | Performed by: ADVANCED PRACTICE MIDWIFE

## 2025-05-29 PROCEDURE — 00HU33Z INSERTION OF INFUSION DEVICE INTO SPINAL CANAL, PERCUTANEOUS APPROACH: ICD-10-PCS | Performed by: PAIN MEDICINE

## 2025-05-29 PROCEDURE — 3E0R3BZ INTRODUCTION OF ANESTHETIC AGENT INTO SPINAL CANAL, PERCUTANEOUS APPROACH: ICD-10-PCS | Performed by: PAIN MEDICINE

## 2025-05-29 RX ORDER — NALBUPHINE HYDROCHLORIDE 20 MG/ML
2.5-5 INJECTION INTRAMUSCULAR; INTRAVENOUS; SUBCUTANEOUS EVERY 6 HOURS PRN
Status: DISCONTINUED | OUTPATIENT
Start: 2025-05-29 | End: 2025-05-31 | Stop reason: HOSPADM

## 2025-05-29 RX ORDER — CITRIC ACID/SODIUM CITRATE 334-500MG
30 SOLUTION, ORAL ORAL
Status: DISCONTINUED | OUTPATIENT
Start: 2025-05-29 | End: 2025-05-30 | Stop reason: HOSPADM

## 2025-05-29 RX ORDER — MISOPROSTOL 200 UG/1
400 TABLET ORAL
Status: DISCONTINUED | OUTPATIENT
Start: 2025-05-29 | End: 2025-05-30 | Stop reason: HOSPADM

## 2025-05-29 RX ORDER — MISOPROSTOL 200 UG/1
800 TABLET ORAL
Status: DISCONTINUED | OUTPATIENT
Start: 2025-05-29 | End: 2025-05-30 | Stop reason: HOSPADM

## 2025-05-29 RX ORDER — EPHEDRINE SULFATE 50 MG/ML
INJECTION, SOLUTION INTRAMUSCULAR; INTRAVENOUS; SUBCUTANEOUS
Status: DISCONTINUED
Start: 2025-05-29 | End: 2025-05-30 | Stop reason: WASHOUT

## 2025-05-29 RX ORDER — ONDANSETRON 2 MG/ML
4 INJECTION INTRAMUSCULAR; INTRAVENOUS EVERY 6 HOURS PRN
Status: DISCONTINUED | OUTPATIENT
Start: 2025-05-29 | End: 2025-05-30 | Stop reason: HOSPADM

## 2025-05-29 RX ORDER — LIDOCAINE 40 MG/G
CREAM TOPICAL
Status: DISCONTINUED | OUTPATIENT
Start: 2025-05-29 | End: 2025-05-30 | Stop reason: HOSPADM

## 2025-05-29 RX ORDER — METHYLERGONOVINE MALEATE 0.2 MG/ML
200 INJECTION INTRAVENOUS
Status: DISCONTINUED | OUTPATIENT
Start: 2025-05-29 | End: 2025-05-30 | Stop reason: HOSPADM

## 2025-05-29 RX ORDER — FENTANYL CITRATE-0.9 % NACL/PF 10 MCG/ML
100 PLASTIC BAG, INJECTION (ML) INTRAVENOUS EVERY 5 MIN PRN
Status: DISCONTINUED | OUTPATIENT
Start: 2025-05-29 | End: 2025-05-30 | Stop reason: HOSPADM

## 2025-05-29 RX ORDER — NALOXONE HYDROCHLORIDE 0.4 MG/ML
0.4 INJECTION, SOLUTION INTRAMUSCULAR; INTRAVENOUS; SUBCUTANEOUS
Status: DISCONTINUED | OUTPATIENT
Start: 2025-05-29 | End: 2025-05-31 | Stop reason: HOSPADM

## 2025-05-29 RX ORDER — OXYTOCIN/0.9 % SODIUM CHLORIDE 30/500 ML
340 PLASTIC BAG, INJECTION (ML) INTRAVENOUS CONTINUOUS PRN
Status: DISCONTINUED | OUTPATIENT
Start: 2025-05-29 | End: 2025-05-30 | Stop reason: HOSPADM

## 2025-05-29 RX ORDER — TRANEXAMIC ACID 10 MG/ML
1 INJECTION, SOLUTION INTRAVENOUS EVERY 30 MIN PRN
Status: DISCONTINUED | OUTPATIENT
Start: 2025-05-29 | End: 2025-05-30 | Stop reason: HOSPADM

## 2025-05-29 RX ORDER — OXYTOCIN/0.9 % SODIUM CHLORIDE 30/500 ML
100-340 PLASTIC BAG, INJECTION (ML) INTRAVENOUS CONTINUOUS PRN
OUTPATIENT
Start: 2025-05-29

## 2025-05-29 RX ORDER — CEFAZOLIN SODIUM/WATER 2 G/20 ML
2 SYRINGE (ML) INTRAVENOUS SEE ADMIN INSTRUCTIONS
Status: DISCONTINUED | OUTPATIENT
Start: 2025-05-29 | End: 2025-05-30 | Stop reason: HOSPADM

## 2025-05-29 RX ORDER — EPHEDRINE SULFATE 50 MG/ML
5 INJECTION, SOLUTION INTRAMUSCULAR; INTRAVENOUS; SUBCUTANEOUS EVERY 5 MIN PRN
Status: DISCONTINUED | OUTPATIENT
Start: 2025-05-29 | End: 2025-05-31 | Stop reason: HOSPADM

## 2025-05-29 RX ORDER — METOCLOPRAMIDE 10 MG/1
10 TABLET ORAL EVERY 6 HOURS PRN
Status: DISCONTINUED | OUTPATIENT
Start: 2025-05-29 | End: 2025-05-30 | Stop reason: HOSPADM

## 2025-05-29 RX ORDER — METOCLOPRAMIDE HYDROCHLORIDE 5 MG/ML
10 INJECTION INTRAMUSCULAR; INTRAVENOUS EVERY 6 HOURS PRN
Status: DISCONTINUED | OUTPATIENT
Start: 2025-05-29 | End: 2025-05-30 | Stop reason: HOSPADM

## 2025-05-29 RX ORDER — CARBOPROST TROMETHAMINE 250 UG/ML
250 INJECTION, SOLUTION INTRAMUSCULAR
Status: DISCONTINUED | OUTPATIENT
Start: 2025-05-29 | End: 2025-05-30 | Stop reason: HOSPADM

## 2025-05-29 RX ORDER — ACETAMINOPHEN 325 MG/1
650 TABLET ORAL EVERY 4 HOURS PRN
Status: DISCONTINUED | OUTPATIENT
Start: 2025-05-29 | End: 2025-05-30 | Stop reason: HOSPADM

## 2025-05-29 RX ORDER — CEFAZOLIN SODIUM/WATER 2 G/20 ML
2 SYRINGE (ML) INTRAVENOUS
Status: COMPLETED | OUTPATIENT
Start: 2025-05-29 | End: 2025-05-29

## 2025-05-29 RX ORDER — LOPERAMIDE HYDROCHLORIDE 2 MG/1
4 CAPSULE ORAL
Status: DISCONTINUED | OUTPATIENT
Start: 2025-05-29 | End: 2025-05-30 | Stop reason: HOSPADM

## 2025-05-29 RX ORDER — OXYTOCIN 10 [USP'U]/ML
10 INJECTION, SOLUTION INTRAMUSCULAR; INTRAVENOUS
OUTPATIENT
Start: 2025-05-29

## 2025-05-29 RX ORDER — SODIUM CHLORIDE, SODIUM LACTATE, POTASSIUM CHLORIDE, CALCIUM CHLORIDE 600; 310; 30; 20 MG/100ML; MG/100ML; MG/100ML; MG/100ML
INJECTION, SOLUTION INTRAVENOUS CONTINUOUS
Status: DISCONTINUED | OUTPATIENT
Start: 2025-05-29 | End: 2025-05-30 | Stop reason: HOSPADM

## 2025-05-29 RX ORDER — OXYTOCIN/0.9 % SODIUM CHLORIDE 30/500 ML
1-24 PLASTIC BAG, INJECTION (ML) INTRAVENOUS CONTINUOUS
Status: DISCONTINUED | OUTPATIENT
Start: 2025-05-29 | End: 2025-05-30 | Stop reason: HOSPADM

## 2025-05-29 RX ORDER — NALOXONE HYDROCHLORIDE 0.4 MG/ML
0.2 INJECTION, SOLUTION INTRAMUSCULAR; INTRAVENOUS; SUBCUTANEOUS
Status: DISCONTINUED | OUTPATIENT
Start: 2025-05-29 | End: 2025-05-31 | Stop reason: HOSPADM

## 2025-05-29 RX ORDER — FENTANYL/ROPIVACAINE/NS/PF 2MCG/ML-.1
PLASTIC BAG, INJECTION (ML) EPIDURAL
Refills: 0 | Status: DISCONTINUED | OUTPATIENT
Start: 2025-05-29 | End: 2025-05-30 | Stop reason: HOSPADM

## 2025-05-29 RX ORDER — TERBUTALINE SULFATE 1 MG/ML
0.25 INJECTION SUBCUTANEOUS
Status: DISCONTINUED | OUTPATIENT
Start: 2025-05-29 | End: 2025-05-30 | Stop reason: HOSPADM

## 2025-05-29 RX ORDER — OXYTOCIN 10 [USP'U]/ML
10 INJECTION, SOLUTION INTRAMUSCULAR; INTRAVENOUS
Status: DISCONTINUED | OUTPATIENT
Start: 2025-05-29 | End: 2025-05-30 | Stop reason: HOSPADM

## 2025-05-29 RX ORDER — ONDANSETRON 4 MG/1
4 TABLET, ORALLY DISINTEGRATING ORAL EVERY 6 HOURS PRN
Status: DISCONTINUED | OUTPATIENT
Start: 2025-05-29 | End: 2025-05-30 | Stop reason: HOSPADM

## 2025-05-29 RX ORDER — FENTANYL CITRATE 50 UG/ML
100 INJECTION, SOLUTION INTRAMUSCULAR; INTRAVENOUS
Refills: 0 | Status: DISCONTINUED | OUTPATIENT
Start: 2025-05-29 | End: 2025-05-30 | Stop reason: HOSPADM

## 2025-05-29 RX ORDER — ACETAMINOPHEN 325 MG/1
975 TABLET ORAL ONCE
Status: DISCONTINUED | OUTPATIENT
Start: 2025-05-29 | End: 2025-05-30 | Stop reason: HOSPADM

## 2025-05-29 RX ORDER — OXYTOCIN/0.9 % SODIUM CHLORIDE 30/500 ML
100-340 PLASTIC BAG, INJECTION (ML) INTRAVENOUS CONTINUOUS PRN
Status: DISCONTINUED | OUTPATIENT
Start: 2025-05-29 | End: 2025-05-31 | Stop reason: HOSPADM

## 2025-05-29 RX ORDER — IBUPROFEN 800 MG/1
800 TABLET, FILM COATED ORAL
Status: COMPLETED | OUTPATIENT
Start: 2025-05-29 | End: 2025-05-30

## 2025-05-29 RX ORDER — KETOROLAC TROMETHAMINE 15 MG/ML
15 INJECTION, SOLUTION INTRAMUSCULAR; INTRAVENOUS
Status: COMPLETED | OUTPATIENT
Start: 2025-05-29 | End: 2025-05-30

## 2025-05-29 RX ORDER — LOPERAMIDE HYDROCHLORIDE 2 MG/1
2 CAPSULE ORAL
Status: DISCONTINUED | OUTPATIENT
Start: 2025-05-29 | End: 2025-05-30 | Stop reason: HOSPADM

## 2025-05-29 RX ORDER — PROCHLORPERAZINE MALEATE 10 MG
10 TABLET ORAL EVERY 6 HOURS PRN
Status: DISCONTINUED | OUTPATIENT
Start: 2025-05-29 | End: 2025-05-30 | Stop reason: HOSPADM

## 2025-05-29 RX ORDER — SODIUM CHLORIDE, SODIUM LACTATE, POTASSIUM CHLORIDE, CALCIUM CHLORIDE 600; 310; 30; 20 MG/100ML; MG/100ML; MG/100ML; MG/100ML
INJECTION, SOLUTION INTRAVENOUS CONTINUOUS PRN
Status: DISCONTINUED | OUTPATIENT
Start: 2025-05-29 | End: 2025-05-30 | Stop reason: HOSPADM

## 2025-05-29 RX ORDER — FENTANYL CITRATE-0.9 % NACL/PF 10 MCG/ML
100 PLASTIC BAG, INJECTION (ML) INTRAVENOUS EVERY 5 MIN PRN
Status: COMPLETED | OUTPATIENT
Start: 2025-05-29 | End: 2025-05-29

## 2025-05-29 RX ORDER — OXYTOCIN 10 [USP'U]/ML
10 INJECTION, SOLUTION INTRAMUSCULAR; INTRAVENOUS
Status: DISCONTINUED | OUTPATIENT
Start: 2025-05-29 | End: 2025-05-31 | Stop reason: HOSPADM

## 2025-05-29 RX ADMIN — Medication 100 MCG: at 21:53

## 2025-05-29 RX ADMIN — Medication 2 MILLI-UNITS/MIN: at 13:36

## 2025-05-29 RX ADMIN — Medication 100 MCG: at 21:22

## 2025-05-29 RX ADMIN — Medication: at 21:12

## 2025-05-29 RX ADMIN — Medication 100 MCG: at 21:27

## 2025-05-29 RX ADMIN — SODIUM CHLORIDE, SODIUM LACTATE, POTASSIUM CHLORIDE, AND CALCIUM CHLORIDE 500 ML: .6; .31; .03; .02 INJECTION, SOLUTION INTRAVENOUS at 12:44

## 2025-05-29 RX ADMIN — Medication 100 MCG: at 22:11

## 2025-05-29 RX ADMIN — Medication 100 MCG: at 21:41

## 2025-05-29 RX ADMIN — Medication 100 MCG: at 21:35

## 2025-05-29 RX ADMIN — Medication 100 MCG: at 22:04

## 2025-05-29 RX ADMIN — SODIUM CHLORIDE, SODIUM LACTATE, POTASSIUM CHLORIDE, AND CALCIUM CHLORIDE: .6; .31; .03; .02 INJECTION, SOLUTION INTRAVENOUS at 19:13

## 2025-05-29 RX ADMIN — Medication 100 MCG: at 21:57

## 2025-05-29 ASSESSMENT — ACTIVITIES OF DAILY LIVING (ADL)
ADLS_ACUITY_SCORE: 15
ADLS_ACUITY_SCORE: 41
ADLS_ACUITY_SCORE: 15

## 2025-05-29 NOTE — PROGRESS NOTES
"Labor Progress Note  2025 5:50 PM     Patient Name:  Adrien Mcelroy  :      1990  MRN:      [id number]      Assessment:  34 year old  at 40w0d weeks gestation  Normal Labor, slow progress   FHR Category 1 Tracing  GBS negative   Rubella Non-Immune  Anxiety, stable  BMI 34.20  More than expected weight gain       Plan:   -Discussed option for AROM, patient in agreement, performed in usual fashion for light meconium fluid   -Continue nitrous oxide for pain control  Encourage position changes  CNM support as needed  Anticipate   Reevaluate in 2 hours or sooner with change in status     Subjective:  Adrien Mcelroy has been laboring in multiple positions with use of nitrous oxide for pain control.  remains at bedside and supportive.     Objective:  /62   Temp 98  F (36.7  C) (Axillary)   Resp 18   Ht 1.6 m (5' 3\")   Wt 104.3 kg (230 lb)   LMP 2024 (Exact Date)   SpO2 99%   BMI 40.74 kg/m       baseline, moderate variability accelerations present, no decelerations   Uterine contractions: every 2-3 minutes  SVE: 5-6cm/90 % effaced /-1 station  BOW felt on exam, AROM performed in usual manner for light meconium fluid   Pitocin at 6 mu     Provider: CROW Kelley, MICHELLE, ISREAL    Total time spent with patient:20 minutes, >50% time spent counseling and coordination of care.   "

## 2025-05-29 NOTE — PLAN OF CARE
Goal Outcome Evaluation:      Plan of Care Reviewed With: patient, spouse    Overall Patient Progress: improving  Overall Patient Progress: improving    VSS. Patient coping with contractions, using nitrous. Patient using birthing ball.  supportive at bedside. IV infusing oxytocin, see MAR.

## 2025-05-29 NOTE — PROGRESS NOTES
Writer continuously at bedside adjusting external US. Adrien is laboring unmedicated; she is swaying and using the birthing ball. Difficulty obtaining accurate FHR tracing. SPO2 monitor applied, FHR captured maternal HR at times.

## 2025-05-29 NOTE — PROGRESS NOTES
"Labor Progress Note  2025 1:23 PM     Patient Name:  Adrien Mcelroy  :      1990  MRN:      [id number]      Assessment:  34 year old  at 40w0d weeks gestation  Normal Labor  FHR Category 1 Tracing  GBS negative  Rubella Non-Immune  Anxiety, stable  BMI 34.20  More than expected weight gain       Plan:   -Dicussed option for AROM or Pitocin augmentation, patient desires Pitocin augmentation at this time  -Discussed option for epidural, patient declines at this time  -Continue Nitrous oxide for pain control as desires   Encourage position changes, ambulation, rest as desired  CNM support as needed  Anticipate   Reevaluate in 2-4 hours or sooner with change in status     Subjective:  Adrien Mcelroy has been laboring in multiple positions and using nitrous oxide for pain control. States she is fatigued but dealing with contractions. Denies leakage of fluid or vaginal bleeding.  at bedside and supportive.     Objective:  /63   Temp 97.6  F (36.4  C) (Oral)   Resp 18   Ht 1.6 m (5' 3\")   Wt 104.3 kg (230 lb)   LMP 2024 (Exact Date)   SpO2 98%   BMI 40.74 kg/m       baseline, moderate variability accelerations present, no decelerations   Uterine contractions: every 4-8 minutes  SVE: 5cm/70 % effaced /-2 station  BOW palpated     Provider: CROW Kelley, CNM, WHNP    Total time spent with patient:15 minutes, >50% time spent counseling and coordination of care.   "

## 2025-05-29 NOTE — H&P
Date: 2025  Time: 10:49 AM    Admission H&P  Adrien Mcelroy  1990, MRN [id number]    [facility]   Supervision of high risk pregnancy, antepartum [O09.90]  Rubella non-immune status, antepartum [O09.899, Z28.39]    PCP: Paola Vicente, [phone]          Extended Emergency Contact Information  Primary Emergency Contact: Conor Hough  Address: [address] [place] [address] [facility]  Mobile Phone: [phone]  Relation: Partner  Secondary Emergency Contact: Sharron Mcelroy   [facility]  Home Phone: [phone]  Relation: Other  Mother: Leonela Mcelroy  Address: [address] [place] [address]  Home Phone: [phone]       Chief Complaint: Uterine contractions     HPI:      Adrien Mcelroy, is a 34 year old, G 1 P 0000 at 40w0d, LMP 2024, Estimated Date of Delivery: May 29, 2025, confirmed by ultrasound at 8 weeks, admitted to [facility] on 2025 at 0900 secondary to: onset of contractions x 30 hours but became more intense at about 8 AM this a.m., denies SROM.    Prenatal History:  Adrien Mcelroy began care with [facility] at the [facility] on 10/23/2024 at 8 weeks gestation with regular care thereafter for a total of 11 visits. Her care was complicated by anxiety, history of infertility, history of back pain with history of spinal surgery, rubella nonimmune (needs postpartum MMR), risk of preeclampsia so started low-dose ASA.,  Elevated BMI 34, more than expected weight gain (TWG 37 pounds)    Pre-pregnant weight: 193 lbs   Pre-pregnant BMI: 34.2  Total weight gain: 37 lbs      Pertinent Labs:    Prenatal Office Visit on 2025   Component Date Value Ref Range Status    Group B Strep PCR 2025 Negative  Negative Final    Presumed negative for Streptococcus agalactiae (Group B Streptococcus) or the number of organisms may be below the limit of detection of the assay.    Rupture of Fetal Membranes by ROM * 2025 Negative   Negative, Invalid, Suggest Repeat Final    Hemoglobin 05/07/2025 11.4 (L)  11.7 - 15.7 g/dL Final   Prenatal Office Visit on 03/19/2025   Component Date Value Ref Range Status    Treponema Antibody Total 03/19/2025 Nonreactive  Nonreactive Final   Prenatal Office Visit on 02/19/2025   Component Date Value Ref Range Status    Glu Gest Screen 1hr 50g 02/19/2025 106  70 - 129 mg/dL Final    Hemoglobin 02/19/2025 12.0  11.7 - 15.7 g/dL Final    Hold Specimen 02/19/2025 JIC   Final   Office Visit on 11/06/2024   Component Date Value Ref Range Status    Trichomonas 11/06/2024 Absent  Absent Final    Yeast 11/06/2024 Absent  Absent Final    Clue Cells 11/06/2024 Absent  Absent Final    WBCs/high power field 11/06/2024 None  None Final    Chlamydia Trachomatis 11/06/2024 Negative  Negative Final    Negative for C. trachomatis rRNA by transcription mediated amplification.   A negative result by transcription mediated amplification does not preclude the presence of infection because results are dependent on proper and adequate collection, absence of inhibitors and sufficient rRNA to be detected.    Neisseria gonorrhoeae 11/06/2024 Negative  Negative Final    Negative for N. gonorrhoeae rRNA by transcription mediated amplification. A negative result by transcription mediated amplification does not preclude the presence of C. trachomatis infection because results are dependent on proper and adequate collection, absence of inhibitors and sufficient rRNA to be detected.    See Scanned Result 11/06/2024 MYRIAD NON-INVASIVE PRENATAL SCREENING PREQUEL-Scanned   Final   Hospital Outpatient Visit on 10/23/2024   Component Date Value Ref Range Status    ABO/RH(D) 10/23/2024 O POS   Final    SPECIMEN EXPIRATION DATE 10/23/2024 [id number]   Final    Antibody Screen 10/23/2024 Negative  Negative Final    SPECIMEN EXPIRATION DATE 10/23/2024 [id number]   Final    WBC Count 10/23/2024 10.3  4.0 - 11.0 10e3/uL Final    RBC Count  10/23/2024 4.86  3.80 - 5.20 10e6/uL Final    Hemoglobin 10/23/2024 14.6  11.7 - 15.7 g/dL Final    Hematocrit 10/23/2024 43.1  35.0 - 47.0 % Final    MCV 10/23/2024 89  78 - 100 fL Final    MCH 10/23/2024 30.0  26.5 - 33.0 pg Final    MCHC 10/23/2024 33.9  31.5 - 36.5 g/dL Final    RDW 10/23/2024 13.1  10.0 - 15.0 % Final    Platelet Count 10/23/2024 306  150 - 450 10e3/uL Final    Rubella Rajwinder IgG Instrument Value 10/23/2024 0.61  <0.90 Index Final    Rubella Antibody IgG 10/23/2024 No detectable antibody.   Final    Treponema Antibody Total 10/23/2024 Nonreactive  Nonreactive Final    Hepatitis B Surface Antigen 10/23/2024 Nonreactive  Nonreactive Final    Hepatitis C Antibody 10/23/2024 Nonreactive  Nonreactive Final    A nonreactive screening test result does not exclude the possibility of exposure to or infection with HCV. Nonreactive screening test results in individuals with prior exposure to HCV may be due to antibody levels below the limit of detection of this assay or lack of reactivity to the HCV antigens used in this assay. Patients with recent HCV infections (<3 months from time of exposure) may have false-negative HCV antibody results due to the time needed for seroconversion (average of 8 to 9 weeks).    HIV Antigen Antibody Combo 10/23/2024 Nonreactive  Nonreactive Final    Negative HIV-1 p24 antigen and HIV-1/2 antibody screening test results usually indicate the absence of HIV-1 and HIV-2 infection. However, such negative results do not rule-out acute HIV infection.  If acute HIV-1 or HIV-2 infection is suspected, detection of HIV-1 or HIV-2 RNA  is recommended. This result is obtained using the Roche Elecsys HIV Duo method on the vickie [id number] immunoassay analyzer.    Culture 10/23/2024 <10,000 CFU/mL Mixture of Urogenital Yanni   Final   Prenatal Office Visit on 10/23/2024   Component Date Value Ref Range Status    Chlamydia trachomatis 10/23/2024 Negative  Negative Final    A negative result by  transcription mediated amplification does not preclude the presence of C. trachomatis infection because results are dependent on proper and adequate collection, absence of inhibitors and sufficient rRNA to be detected.    Neisseria gonorrhoeae 10/23/2024 Negative  Negative Final    Negative for N. gonorrhoeae rRNA by transcription mediated amplification. A negative result by transcription mediated amplification does not preclude the presence of C. trachomatis infection because results are dependent on proper and adequate collection, absence of inhibitors and sufficient rRNA to be detected.   Office Visit on 09/23/2024   Component Date Value Ref Range Status    Estimated Average Glucose 09/23/2024 103  <117 mg/dL Final    Hemoglobin A1C 09/23/2024 5.2  0.0 - 5.6 % Final    Normal <5.7%   Prediabetes 5.7-6.4%    Diabetes 6.5% or higher     Note: Adopted from ADA consensus guidelines.    Cholesterol 09/23/2024 161  <200 mg/dL Final    Triglycerides 09/23/2024 116  <150 mg/dL Final    Direct Measure HDL 09/23/2024 51  >=50 mg/dL Final    LDL Cholesterol Calculated 09/23/2024 87  <100 mg/dL Final    Non HDL Cholesterol 09/23/2024 110  <130 mg/dL Final    Patient Fasting > 8hrs? 09/23/2024 No   Final    Sodium 09/23/2024 138  135 - 145 mmol/L Final    Potassium 09/23/2024 4.2  3.4 - 5.3 mmol/L Final    Carbon Dioxide (CO2) 09/23/2024 24  22 - 29 mmol/L Final    Anion Gap 09/23/2024 9  7 - 15 mmol/L Final    Urea Nitrogen 09/23/2024 10.4  6.0 - 20.0 mg/dL Final    Creatinine 09/23/2024 0.68  0.51 - 0.95 mg/dL Final    GFR Estimate 09/23/2024 >90  >60 mL/min/1.73m2 Final    eGFR calculated using 2021 CKD-EPI equation.    Calcium 09/23/2024 9.1  8.8 - 10.4 mg/dL Final    Reference intervals for this test were updated on 7/16/2024 to reflect our healthy population more accurately. There may be differences in the flagging of prior results with similar values performed with this method. Those prior results can be interpreted in  the context of the updated reference intervals.    Chloride 2024 105  98 - 107 mmol/L Final    Glucose 2024 94  70 - 99 mg/dL Final    Alkaline Phosphatase 2024 75  40 - 150 U/L Final    AST 2024 27  0 - 45 U/L Final    ALT 2024 37  0 - 50 U/L Final    Protein Total 2024 7.1  6.4 - 8.3 g/dL Final    Albumin 2024 4.3  3.5 - 5.2 g/dL Final    Bilirubin Total 2024 0.4  <=1.2 mg/dL Final    Patient Fasting > 8hrs? 2024 No   Final    Vitamin D, Total (25-Hydroxy) 2024 21  20 - 50 ng/mL Final    optimum levels       Pertinent Radiology:         Working OLRENA: 2025 set by Ashley Blackburn CNM on 10/23/2024 based on Ultrasound on 10/23/2024  Based On LORENA GA Diff User Date   Last Menstrual Period on 2024 (Exact Date) 2025 +1w2d Ashley Blackburn CNM 10/23/2024      Ultrasound on 10/23/2024 2025 Working Ashley Blackburn CNM 10/23/2024   GA: 8w6d   Ultrasound on 01/10/2025 2025 Same Lynn Mann CNM 2025   GA: 20w1d  Comment: FAS: Anatomy within normal limits, posterior placenta, amniotic fluid normal, normal growth (47th%), cervix length 4.1 cm       OB History  OB History    Para Term  AB Living   1 0 0 0 0 0   SAB IAB Ectopic Multiple Live Births   0 0 0 0 0      # Outcome Date GA Lbr David/2nd Weight Sex Type Anes PTL Lv   1 Current                Medical History  See history in [facility]   Surgical History  She  has a past surgical history that includes Lumbar Fusion (2010); [place] Tooth Extraction; and Band Hemorrhoidectomy (2018 x 4).       Social History  Reviewed, and she  reports that she has never smoked. She has never been exposed to tobacco smoke. She has never used smokeless tobacco. She reports that she does not drink alcohol and does not use drugs.        Family History  Reviewed, and family history includes Kidney Disease in her maternal grandmother; Lymphoma (age of onset: 48) in her  "father; No Known Problems in her brother, brother, mother, sister, sister, and sister.   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 [employer]. [employer] [facility] [employer]. Stressful being a business owner.      Additional psychosocial needs reviewed per nursing assessment.       Allergies   Allergen Reactions    Covid-19 (Mrna) Vaccine Anaphylaxis    Other Drug Allergy (See Comments) Anaphylaxis     COVID-19 VACCINE, MRNA, [id number], LNP-S (PFIZER)    Contrave [Naltrexone-Bupropion Hcl Er] Headache, Nausea and Vomiting and Visual Disturbance     dizziness, and stomach pain.    Ciprofloxacin Unknown    Other Environmental Allergy Unknown     Seasonal       Medications Prior to Admission   Medication Sig Dispense Refill Last Dose/Taking    aspirin (ASA) 81 MG chewable tablet Take 1 tablet (81 mg) by mouth daily. 90 tablet 3     calcium carbonate (TUMS) 500 MG chewable tablet Take 1 tablet (500 mg) by mouth 2 times daily. 90 tablet 1     EPINEPHrine (ANY BX GENERIC EQUIV) 0.3 MG/0.3ML injection 2-pack Inject 0.3 mLs (0.3 mg) into the muscle as needed 2 each 0     omeprazole (PRILOSEC) 20 MG DR capsule Take 1 capsule (20 mg) by mouth daily. 90 capsule 0     SENNA-docusate sodium (SENNA S) 8.6-50 MG tablet Take 1 tablet by mouth at bedtime. 90 tablet 2     VITAMIN D PO Take by mouth.           Review of Systems:  Pertinent items are noted in HPI.   Physical Exam:  Temp:  [98.2  F (36.8  C)] 98.2  F (36.8  C)  Resp:  [15] 15  BP: (108)/(99) 108/99    BP (!) 108/99 (BP Location: Right arm)   Temp 98.2  F (36.8  C) (Oral)   Resp 15   Ht 1.6 m (5' 3\")   Wt 104.3 kg (230 lb)   LMP 08/13/2024 (Exact Date)   BMI 40.74 kg/m      Height: 5 feet 3 inches  General appearance:Pleasant, well groomed  Psych: AAO x3  Skin: Pink, warm & dry  HEENT: unremarkable  Cardiovascular:  RRR, S1, S2, no extra sounds or murmurs  Respiratory:  breath " sounds CTA bilaterally, anteriorly and posteriorly    FHR: Category 1    Uterine contractions: Moderate, every 3 to 5 minutes lasting 60 to 80 seconds    SVE: Per RN 4.5/soft/80%/-2 mid BBOW    Extremeties: Minimal edema       Membrane Status: Membrane Status: Membrane Status: Bulging  Rupture Date:    Fluid color:    Fluid odor:    Fluid Amount:       Cervical Exam: Dilation as above       Fetal Heart Rate:    (per RN documentation)  Rate: 130 baseline Classification: Category 1  Variability:    Pattern:       Uterine Activity: (per RN documentation)  Mode:    Contraction frequency:    Contraction duration:    Contraction quality:          Notable AP/IP factors to date:  1.)GBS negative  2.)Blood type O POS  3.)  History of back pain and back surgery with lumbar fusion  4.)  BMI 34 with more than expected weight gain  5.)  Total weight gain 34 pounds  6.)  Rubella nonimmune (needs MMR PP)  7.)  History of infertility    Impression:  Adrien Mcelroy is a 34 year old year old at 40w0d weeks here in early labor.      Plan:  1.  [facility]  2.  Encourage position changes  3.  Labor support  4.  continuous fetal monitoring per protocol  5.  Collect cord blood at delivery for lab hold due to blood type O POS  6.  Patient would like to try nitrous oxide, before other interventions   7.  Anticipate Spontaneous vaginal birth      Total time with patient: 25 minutes at bedside and in the [facility] obtaining and clarifying the above history, performing the physical exam, education and counseling and developing this plan of care.      Provider:Lynn Mann, MICHELLE, APRN

## 2025-05-29 NOTE — CONSULTS
[facility] Labor and Delivery Consult    Adrien Mcelroy MRN# [id number]   Age: 34 year old YOB: 1990     Date of Admission:  2025    Primary care provider: Paola Vicente  Primary Ob provider: Lynn Mann CNM           Chief Complaint:   Adrien Mcelroy is a 34 year old female who is 40w0d pregnant and was admitted for prolonged latent labor.  CNM requested consultation due to recurrent decelerations in fetal heart rate lowest to 90s for 60 seconds with return to baseline and moderate variability.          Pregnancy history:     OBSTETRIC HISTORY:    OB History    Para Term  AB Living   1 0 0 0 0 0   SAB IAB Ectopic Multiple Live Births   0 0 0 0 0      # Outcome Date GA Lbr David/2nd Weight Sex Type Anes PTL Lv   1 Current                EDC: Estimated Date of Delivery: 25    Prenatal Labs:   Lab Results   Component Value Date    AS Negative 2025    HEPBANG Nonreactive 10/23/2024    CHPCRT Negative 10/23/2024    GCPCRT Negative 10/23/2024    HGB 10.6 (L) 2025         Active Problem List  Patient Active Problem List   Diagnosis    Attention deficit hyperactivity disorder (ADHD), unspecified ADHD type    Vitamin D deficiency    Generalized anxiety disorder    PCOS (polycystic ovarian syndrome)    Class 2 severe obesity with serious comorbidity and body mass index (BMI) of 35.0 to 35.9 in adult (H)    Allergic reaction, sequela    Gastroesophageal reflux disease, esophagitis presence not specified    Has daytime drowsiness    Bilateral shoulder pain, unspecified chronicity    Female infertility    Chronic bilateral low back pain without sciatica    Supervision of high risk pregnancy, antepartum    Back pain with history of spinal surgery    Rubella non-immune status, antepartum    Hemorrhoids, unspecified hemorrhoid type    Class 1 obesity due to excess calories without serious comorbidity with body mass index (BMI) of 34.0 to 34.9 in adult    More than  expected weight gain in pregnancy       Medication Prior to Admission  Medications Prior to Admission   Medication Sig Dispense Refill Last Dose/Taking    aspirin (ASA) 81 MG chewable tablet Take 1 tablet (81 mg) by mouth daily. 90 tablet 3     calcium carbonate (TUMS) 500 MG chewable tablet Take 1 tablet (500 mg) by mouth 2 times daily. 90 tablet 1     EPINEPHrine (ANY BX GENERIC EQUIV) 0.3 MG/0.3ML injection 2-pack Inject 0.3 mLs (0.3 mg) into the muscle as needed 2 each 0     omeprazole (PRILOSEC) 20 MG DR capsule Take 1 capsule (20 mg) by mouth daily. 90 capsule 0     SENNA-docusate sodium (SENNA S) 8.6-50 MG tablet Take 1 tablet by mouth at bedtime. 90 tablet 2     VITAMIN D PO Take by mouth.      .        Maternal Past Medical History:     Past Medical History:   Diagnosis Date    Attention deficit hyperactivity disorder (ADHD), unspecified ADHD type 03/02/2016    External hemorrhoids 06/21/2016    Hemorrhoids 06/21/2016    Iron deficiency anemia due to chronic blood loss 04/18/2017    Major depressive disorder, recurrent episode, moderate (H) 03/02/2016    Major depressive disorder, recurrent episode, moderate (H) 03/02/2016    Menorrhagia with irregular cycle 05/22/2018    Post concussive syndrome 06/21/2016          Physical Exam:   Vitals were reviewed  Patient Vitals for the past 8 hrs:   BP Temp Temp src Resp SpO2   05/29/25 1803 -- -- -- -- 98 %   05/29/25 1757 122/81 -- -- -- --   05/29/25 1656 109/62 -- -- 18 --   05/29/25 1654 -- 98  F (36.7  C) Axillary -- --   05/29/25 1556 115/75 97.8  F (36.6  C) Oral -- --   05/29/25 1500 108/59 -- -- 16 99 %   05/29/25 1400 114/60 98  F (36.7  C) Oral 18 99 %   05/29/25 1350 -- -- -- -- 99 %   05/29/25 1310 -- -- -- -- 98 %   05/29/25 1300 -- -- -- -- 100 %   05/29/25 1250 -- -- -- -- 98 %   05/29/25 1240 -- -- -- -- 98 %   05/29/25 1230 -- -- -- -- 97 %   05/29/25 1220 -- -- -- -- 99 %   05/29/25 1210 117/63 97.6  F (36.4  C) Oral 18 98 %     In bed-using  nitrous oxide during contractions  Cervix:   Membranes: SROM  meconium stained fluid   Dilation: 6.5cm per CNM   Presentation:Cephalic  Fetal Heart Rate Tracing: Tier 2 (indeterminate)   Tocometer: external monitor and frequency q 2-6minutes                       Assessment:   Adrien Mcelroy is a 40w0d pregnant female admitted with prolonged latent labor and now with active labor management.          Plan:   Recent cervical change, pt in active labor  Plan in utero-resuscitation, pitocin turned off for now, monitor for ability to restart if decelerations resolve. CNM to continue primary management of labor-may re-consult us at any time.     Kayla Lee MD

## 2025-05-29 NOTE — PROGRESS NOTES
Patient called RN to bedside at 1553 for SROM. Large pool of clear fluid observed on floor. Patient reports feeling increased intensity of contractions. RN assisted patient to right side for hip release, patient reports relief in this position. Using nitrous, states that it is working well for managing pain of labor. RN at bedside with patient continuously for 30 minutes, providing emotional labor support. Patient coping well with contractions. Contractions palpate moderate, abdomen soft in between. FHR moderate variability with accels, variables noted with contractions, one late noted at 1606, baseline 135 bpm. Patient reports feeling some rectal pressure and experiencing some mild nausea.    RN to update CNM with patient status, CNM currently with another patient on unit.

## 2025-05-30 LAB
HGB BLD-MCNC: 9.5 G/DL (ref 11.7–15.7)
MCV RBC AUTO: 78 FL (ref 78–100)

## 2025-05-30 PROCEDURE — 120N000001 HC R&B MED SURG/OB

## 2025-05-30 PROCEDURE — 250N000013 HC RX MED GY IP 250 OP 250 PS 637: Performed by: OBSTETRICS & GYNECOLOGY

## 2025-05-30 PROCEDURE — 88307 TISSUE EXAM BY PATHOLOGIST: CPT | Mod: 26 | Performed by: PATHOLOGY

## 2025-05-30 PROCEDURE — 250N000011 HC RX IP 250 OP 636

## 2025-05-30 PROCEDURE — 36415 COLL VENOUS BLD VENIPUNCTURE: CPT | Performed by: OBSTETRICS & GYNECOLOGY

## 2025-05-30 PROCEDURE — 88307 TISSUE EXAM BY PATHOLOGIST: CPT | Mod: TC | Performed by: OBSTETRICS & GYNECOLOGY

## 2025-05-30 PROCEDURE — 250N000011 HC RX IP 250 OP 636: Mod: JZ | Performed by: OBSTETRICS & GYNECOLOGY

## 2025-05-30 PROCEDURE — 999N000249 HC STATISTIC C-SECTION ON UNIT

## 2025-05-30 PROCEDURE — 85018 HEMOGLOBIN: CPT | Performed by: OBSTETRICS & GYNECOLOGY

## 2025-05-30 PROCEDURE — 3E0234Z INTRODUCTION OF SERUM, TOXOID AND VACCINE INTO MUSCLE, PERCUTANEOUS APPROACH: ICD-10-PCS | Performed by: OBSTETRICS & GYNECOLOGY

## 2025-05-30 RX ORDER — ONDANSETRON 2 MG/ML
4 INJECTION INTRAMUSCULAR; INTRAVENOUS EVERY 30 MIN PRN
Status: DISCONTINUED | OUTPATIENT
Start: 2025-05-30 | End: 2025-05-30 | Stop reason: HOSPADM

## 2025-05-30 RX ORDER — BISACODYL 10 MG
10 SUPPOSITORY, RECTAL RECTAL DAILY PRN
Status: DISCONTINUED | OUTPATIENT
Start: 2025-06-01 | End: 2025-05-31 | Stop reason: HOSPADM

## 2025-05-30 RX ORDER — ONDANSETRON 4 MG/1
4 TABLET, ORALLY DISINTEGRATING ORAL EVERY 30 MIN PRN
Status: DISCONTINUED | OUTPATIENT
Start: 2025-05-30 | End: 2025-05-30 | Stop reason: HOSPADM

## 2025-05-30 RX ORDER — DEXAMETHASONE SODIUM PHOSPHATE 4 MG/ML
4 INJECTION, SOLUTION INTRA-ARTICULAR; INTRALESIONAL; INTRAMUSCULAR; INTRAVENOUS; SOFT TISSUE
Status: DISCONTINUED | OUTPATIENT
Start: 2025-05-30 | End: 2025-05-30

## 2025-05-30 RX ORDER — ONDANSETRON 4 MG/1
4 TABLET, ORALLY DISINTEGRATING ORAL EVERY 30 MIN PRN
Status: DISCONTINUED | OUTPATIENT
Start: 2025-05-30 | End: 2025-05-30

## 2025-05-30 RX ORDER — NALOXONE HYDROCHLORIDE 0.4 MG/ML
0.1 INJECTION, SOLUTION INTRAMUSCULAR; INTRAVENOUS; SUBCUTANEOUS
Status: DISCONTINUED | OUTPATIENT
Start: 2025-05-30 | End: 2025-05-31 | Stop reason: HOSPADM

## 2025-05-30 RX ORDER — MISOPROSTOL 200 UG/1
800 TABLET ORAL
Status: DISCONTINUED | OUTPATIENT
Start: 2025-05-30 | End: 2025-05-31 | Stop reason: HOSPADM

## 2025-05-30 RX ORDER — OXYCODONE HYDROCHLORIDE 5 MG/1
5 TABLET ORAL
Status: DISCONTINUED | OUTPATIENT
Start: 2025-05-30 | End: 2025-05-30

## 2025-05-30 RX ORDER — OXYCODONE HYDROCHLORIDE 5 MG/1
5 TABLET ORAL EVERY 4 HOURS PRN
Status: DISCONTINUED | OUTPATIENT
Start: 2025-05-30 | End: 2025-05-31 | Stop reason: HOSPADM

## 2025-05-30 RX ORDER — ONDANSETRON 2 MG/ML
4 INJECTION INTRAMUSCULAR; INTRAVENOUS EVERY 30 MIN PRN
Status: DISCONTINUED | OUTPATIENT
Start: 2025-05-30 | End: 2025-05-30

## 2025-05-30 RX ORDER — ACETAMINOPHEN 325 MG/1
975 TABLET ORAL EVERY 6 HOURS
Status: DISCONTINUED | OUTPATIENT
Start: 2025-05-30 | End: 2025-05-31 | Stop reason: HOSPADM

## 2025-05-30 RX ORDER — ONDANSETRON 4 MG/1
4 TABLET, ORALLY DISINTEGRATING ORAL EVERY 6 HOURS PRN
Status: DISCONTINUED | OUTPATIENT
Start: 2025-05-30 | End: 2025-05-31 | Stop reason: HOSPADM

## 2025-05-30 RX ORDER — LIDOCAINE 40 MG/G
CREAM TOPICAL
Status: DISCONTINUED | OUTPATIENT
Start: 2025-05-30 | End: 2025-05-31 | Stop reason: HOSPADM

## 2025-05-30 RX ORDER — METOCLOPRAMIDE HYDROCHLORIDE 5 MG/ML
10 INJECTION INTRAMUSCULAR; INTRAVENOUS EVERY 6 HOURS PRN
Status: DISCONTINUED | OUTPATIENT
Start: 2025-05-30 | End: 2025-05-31 | Stop reason: HOSPADM

## 2025-05-30 RX ORDER — LOPERAMIDE HYDROCHLORIDE 2 MG/1
2 CAPSULE ORAL
Status: DISCONTINUED | OUTPATIENT
Start: 2025-05-30 | End: 2025-05-31 | Stop reason: HOSPADM

## 2025-05-30 RX ORDER — OXYTOCIN/0.9 % SODIUM CHLORIDE 30/500 ML
340 PLASTIC BAG, INJECTION (ML) INTRAVENOUS CONTINUOUS PRN
Status: DISCONTINUED | OUTPATIENT
Start: 2025-05-30 | End: 2025-05-31 | Stop reason: HOSPADM

## 2025-05-30 RX ORDER — ONDANSETRON 2 MG/ML
4 INJECTION INTRAMUSCULAR; INTRAVENOUS EVERY 6 HOURS PRN
Status: DISCONTINUED | OUTPATIENT
Start: 2025-05-30 | End: 2025-05-31 | Stop reason: HOSPADM

## 2025-05-30 RX ORDER — IBUPROFEN 800 MG/1
800 TABLET, FILM COATED ORAL EVERY 6 HOURS
Status: DISCONTINUED | OUTPATIENT
Start: 2025-05-31 | End: 2025-05-31 | Stop reason: HOSPADM

## 2025-05-30 RX ORDER — FENTANYL CITRATE 50 UG/ML
50 INJECTION, SOLUTION INTRAMUSCULAR; INTRAVENOUS EVERY 5 MIN PRN
Status: DISCONTINUED | OUTPATIENT
Start: 2025-05-30 | End: 2025-05-30 | Stop reason: HOSPADM

## 2025-05-30 RX ORDER — CARBOPROST TROMETHAMINE 250 UG/ML
250 INJECTION, SOLUTION INTRAMUSCULAR
Status: DISCONTINUED | OUTPATIENT
Start: 2025-05-30 | End: 2025-05-31 | Stop reason: HOSPADM

## 2025-05-30 RX ORDER — DEXAMETHASONE SODIUM PHOSPHATE 4 MG/ML
4 INJECTION, SOLUTION INTRA-ARTICULAR; INTRALESIONAL; INTRAMUSCULAR; INTRAVENOUS; SOFT TISSUE
Status: DISCONTINUED | OUTPATIENT
Start: 2025-05-30 | End: 2025-05-30 | Stop reason: HOSPADM

## 2025-05-30 RX ORDER — HYDROCORTISONE 25 MG/G
CREAM TOPICAL 3 TIMES DAILY PRN
Status: DISCONTINUED | OUTPATIENT
Start: 2025-05-30 | End: 2025-05-31 | Stop reason: HOSPADM

## 2025-05-30 RX ORDER — AMOXICILLIN 250 MG
1 CAPSULE ORAL 2 TIMES DAILY
Status: DISCONTINUED | OUTPATIENT
Start: 2025-05-30 | End: 2025-05-31 | Stop reason: HOSPADM

## 2025-05-30 RX ORDER — TRANEXAMIC ACID 10 MG/ML
1 INJECTION, SOLUTION INTRAVENOUS EVERY 30 MIN PRN
Status: DISCONTINUED | OUTPATIENT
Start: 2025-05-30 | End: 2025-05-31 | Stop reason: HOSPADM

## 2025-05-30 RX ORDER — PROCHLORPERAZINE MALEATE 10 MG
10 TABLET ORAL EVERY 6 HOURS PRN
Status: DISCONTINUED | OUTPATIENT
Start: 2025-05-30 | End: 2025-05-31 | Stop reason: HOSPADM

## 2025-05-30 RX ORDER — LOPERAMIDE HYDROCHLORIDE 2 MG/1
4 CAPSULE ORAL
Status: DISCONTINUED | OUTPATIENT
Start: 2025-05-30 | End: 2025-05-31 | Stop reason: HOSPADM

## 2025-05-30 RX ORDER — HYDROMORPHONE HYDROCHLORIDE 1 MG/ML
0.4 INJECTION, SOLUTION INTRAMUSCULAR; INTRAVENOUS; SUBCUTANEOUS EVERY 5 MIN PRN
Status: DISCONTINUED | OUTPATIENT
Start: 2025-05-30 | End: 2025-05-30 | Stop reason: HOSPADM

## 2025-05-30 RX ORDER — DEXTROSE, SODIUM CHLORIDE, SODIUM LACTATE, POTASSIUM CHLORIDE, AND CALCIUM CHLORIDE 5; .6; .31; .03; .02 G/100ML; G/100ML; G/100ML; G/100ML; G/100ML
INJECTION, SOLUTION INTRAVENOUS CONTINUOUS
Status: DISCONTINUED | OUTPATIENT
Start: 2025-05-30 | End: 2025-05-31 | Stop reason: HOSPADM

## 2025-05-30 RX ORDER — FENTANYL CITRATE 50 UG/ML
25 INJECTION, SOLUTION INTRAMUSCULAR; INTRAVENOUS EVERY 5 MIN PRN
Status: DISCONTINUED | OUTPATIENT
Start: 2025-05-30 | End: 2025-05-30 | Stop reason: HOSPADM

## 2025-05-30 RX ORDER — METOCLOPRAMIDE 10 MG/1
10 TABLET ORAL EVERY 6 HOURS PRN
Status: DISCONTINUED | OUTPATIENT
Start: 2025-05-30 | End: 2025-05-31 | Stop reason: HOSPADM

## 2025-05-30 RX ORDER — SODIUM CHLORIDE, SODIUM LACTATE, POTASSIUM CHLORIDE, CALCIUM CHLORIDE 600; 310; 30; 20 MG/100ML; MG/100ML; MG/100ML; MG/100ML
INJECTION, SOLUTION INTRAVENOUS CONTINUOUS
Status: DISCONTINUED | OUTPATIENT
Start: 2025-05-30 | End: 2025-05-30

## 2025-05-30 RX ORDER — MISOPROSTOL 200 UG/1
400 TABLET ORAL
Status: DISCONTINUED | OUTPATIENT
Start: 2025-05-30 | End: 2025-05-31 | Stop reason: HOSPADM

## 2025-05-30 RX ORDER — KETOROLAC TROMETHAMINE 15 MG/ML
15 INJECTION, SOLUTION INTRAMUSCULAR; INTRAVENOUS EVERY 6 HOURS
Status: COMPLETED | OUTPATIENT
Start: 2025-05-30 | End: 2025-05-30

## 2025-05-30 RX ORDER — METHYLERGONOVINE MALEATE 0.2 MG/ML
200 INJECTION INTRAVENOUS
Status: DISCONTINUED | OUTPATIENT
Start: 2025-05-30 | End: 2025-05-31 | Stop reason: HOSPADM

## 2025-05-30 RX ORDER — SIMETHICONE 80 MG
80 TABLET,CHEWABLE ORAL 4 TIMES DAILY PRN
Status: DISCONTINUED | OUTPATIENT
Start: 2025-05-30 | End: 2025-05-31 | Stop reason: HOSPADM

## 2025-05-30 RX ORDER — AMOXICILLIN 250 MG
2 CAPSULE ORAL 2 TIMES DAILY
Status: DISCONTINUED | OUTPATIENT
Start: 2025-05-30 | End: 2025-05-31 | Stop reason: HOSPADM

## 2025-05-30 RX ORDER — HYDROMORPHONE HYDROCHLORIDE 1 MG/ML
0.2 INJECTION, SOLUTION INTRAMUSCULAR; INTRAVENOUS; SUBCUTANEOUS EVERY 5 MIN PRN
Status: DISCONTINUED | OUTPATIENT
Start: 2025-05-30 | End: 2025-05-30 | Stop reason: HOSPADM

## 2025-05-30 RX ORDER — POLYETHYLENE GLYCOL 3350 17 G/17G
17 POWDER, FOR SOLUTION ORAL DAILY
Status: DISCONTINUED | OUTPATIENT
Start: 2025-05-30 | End: 2025-05-31 | Stop reason: HOSPADM

## 2025-05-30 RX ORDER — NALOXONE HYDROCHLORIDE 0.4 MG/ML
0.1 INJECTION, SOLUTION INTRAMUSCULAR; INTRAVENOUS; SUBCUTANEOUS
Status: DISCONTINUED | OUTPATIENT
Start: 2025-05-30 | End: 2025-05-30 | Stop reason: HOSPADM

## 2025-05-30 RX ORDER — OXYCODONE HYDROCHLORIDE 5 MG/1
10 TABLET ORAL
Status: DISCONTINUED | OUTPATIENT
Start: 2025-05-30 | End: 2025-05-30

## 2025-05-30 RX ORDER — SODIUM PHOSPHATE,MONO-DIBASIC 19G-7G/118
1 ENEMA (ML) RECTAL DAILY PRN
Status: DISCONTINUED | OUTPATIENT
Start: 2025-06-01 | End: 2025-05-31 | Stop reason: HOSPADM

## 2025-05-30 RX ORDER — OXYTOCIN 10 [USP'U]/ML
10 INJECTION, SOLUTION INTRAMUSCULAR; INTRAVENOUS
Status: DISCONTINUED | OUTPATIENT
Start: 2025-05-30 | End: 2025-05-31 | Stop reason: HOSPADM

## 2025-05-30 RX ADMIN — ACETAMINOPHEN 975 MG: 325 TABLET ORAL at 01:22

## 2025-05-30 RX ADMIN — FAMOTIDINE 20 MG: 10 INJECTION, SOLUTION INTRAVENOUS at 13:03

## 2025-05-30 RX ADMIN — ACETAMINOPHEN 975 MG: 325 TABLET ORAL at 07:17

## 2025-05-30 RX ADMIN — ACETAMINOPHEN 975 MG: 325 TABLET ORAL at 18:41

## 2025-05-30 RX ADMIN — KETOROLAC TROMETHAMINE 15 MG: 15 INJECTION, SOLUTION INTRAMUSCULAR; INTRAVENOUS at 13:01

## 2025-05-30 RX ADMIN — DEXTROSE, SODIUM CHLORIDE, SODIUM LACTATE, POTASSIUM CHLORIDE, AND CALCIUM CHLORIDE: 5; .6; .31; .03; .02 INJECTION, SOLUTION INTRAVENOUS at 05:14

## 2025-05-30 RX ADMIN — KETOROLAC TROMETHAMINE 15 MG: 15 INJECTION, SOLUTION INTRAMUSCULAR; INTRAVENOUS at 06:33

## 2025-05-30 RX ADMIN — SENNOSIDES AND DOCUSATE SODIUM 1 TABLET: 50; 8.6 TABLET ORAL at 10:08

## 2025-05-30 RX ADMIN — POLYETHYLENE GLYCOL 3350 17 G: 17 POWDER, FOR SOLUTION ORAL at 13:00

## 2025-05-30 RX ADMIN — SENNOSIDES AND DOCUSATE SODIUM 2 TABLET: 50; 8.6 TABLET ORAL at 20:45

## 2025-05-30 RX ADMIN — SENNOSIDES AND DOCUSATE SODIUM 1 TABLET: 50; 8.6 TABLET ORAL at 01:51

## 2025-05-30 RX ADMIN — KETOROLAC TROMETHAMINE 15 MG: 15 INJECTION, SOLUTION INTRAMUSCULAR; INTRAVENOUS at 20:45

## 2025-05-30 ASSESSMENT — ACTIVITIES OF DAILY LIVING (ADL)
ADLS_ACUITY_SCORE: 15
ADLS_ACUITY_SCORE: 22
ADLS_ACUITY_SCORE: 22
ADLS_ACUITY_SCORE: 15
ADLS_ACUITY_SCORE: 22
ADLS_ACUITY_SCORE: 15
ADLS_ACUITY_SCORE: 19
ADLS_ACUITY_SCORE: 22
ADLS_ACUITY_SCORE: 15
ADLS_ACUITY_SCORE: 22
ADLS_ACUITY_SCORE: 15
ADLS_ACUITY_SCORE: 19
ADLS_ACUITY_SCORE: 15
ADLS_ACUITY_SCORE: 19
ADLS_ACUITY_SCORE: 15
ADLS_ACUITY_SCORE: 19
ADLS_ACUITY_SCORE: 15
ADLS_ACUITY_SCORE: 15
ADLS_ACUITY_SCORE: 19
ADLS_ACUITY_SCORE: 19

## 2025-05-30 NOTE — PLAN OF CARE
Problem: Postpartum ( Delivery)  Goal: Hemostasis  Outcome: Progressing     Problem: Postpartum ( Delivery)  Goal: Anesthesia/Sedation Recovery  Outcome: Progressing     Problem: Postpartum ( Delivery)  Goal: Optimal Pain Control and Function  Outcome: Progressing  Intervention: Prevent or Manage Pain  Recent Flowsheet Documentation  Taken 2025 0030 by Bhavna Yanez RN  Pain Management Interventions:   medication (see MAR)   emotional support   rest    Goal Outcome Evaluation:      Plan of Care Reviewed With: patient    Overall Patient Progress: improvingOverall Patient Progress: improving    Outcome Evaluation:  delivery at 2332. Incision intact with scant draining, dressing marked. Fundus firm and U. . Hgb in AM. Barrientos in place and patent. Scheduled pain medications given and effective. Tolerating food and fluids, diet advanced to regular. IVF running. Stable postpartum transition.

## 2025-05-30 NOTE — PROGRESS NOTES
Late entry d/t patient care:    Difficult to trace FHR d/t maternal movement. RN at bedside continuously from 1543 to 1605 adjusting monitor and palpating uterus for contractions.     Assisted patient to side for side lying hip release, offered emotional support and reassurance.

## 2025-05-30 NOTE — PROGRESS NOTES
Data: Adrien Mcelroy transferred to [facility]25/[id number] via bed. Patient transferred to Postpartum with .    Action: Receiving unit notified of transfer. Patient and support person notified of room change. Patient and belongings accompanied by Registered Nurse.Oriented patient to surroundings. Call light within reach.    Response: Patient tolerated transfer.    Bhavna Yanez RN  2025 3:33 AM

## 2025-05-30 NOTE — PROGRESS NOTES
"    Obstetrics Post-Op Progress Note         Assessment and Plan:    Assessment: Adrien Mcelroy is a 34 year old  Post-operative day #1 s/p Low transverse primary  section doing ok-very tired.     L&D complications: Hypotension with high epidural and subsequent fetal intolerance, unable to restart pitocin so arrest of dilation       Appropriate drop in hgb with EBL from surgery     Plan:   Ambulation encouraged  Lactation consultation  Pain control measures as needed  Anticipate discharge in 2 days           Interval History:   Doing well.  Pain is well-controlled.  No fevers.  No history of wound drainage, warmth or significant erythema.  Just started taking some PO.  Denies chest pain, shortness of breath, nausea or vomiting.  Hasn't ambulated-parsons still in place. Feeling \"lethargic\"           Physical Exam:   Temp: 98.4  F (36.9  C) Temp src: Oral BP: 104/64 Pulse: 67   Resp: 16 SpO2: 97 % O2 Device: None (Room air) Oxygen Delivery: 1/4 LPM  Vitals:    25 0928   Weight: 104.3 kg (230 lb)     Vital Signs with Ranges  Temp:  [97.6  F (36.4  C)-98.9  F (37.2  C)] 98.4  F (36.9  C)  Pulse:  [60-67] 67  Resp:  [15-18] 16  BP: ()/() 104/64  SpO2:  [85 %-100 %] 97 %  I/O last 3 completed shifts:  In: -   Out: 350 [Urine:350]    Uterine fundus is firm, non-tender and at the level of the umbilicus. Abdomen is soft and non-tender  Incision C/D/I-small amount of drainage marked on 7-day bandage  Extremities Non-tender          Data:     Recent Results (from the past 24 hours)   CBC with platelets    Collection Time: 25 10:42 AM   Result Value Ref Range    WBC Count 11.8 (H) 4.0 - 11.0 10e3/uL    RBC Count 4.35 3.80 - 5.20 10e6/uL    Hemoglobin 10.6 (L) 11.7 - 15.7 g/dL    Hematocrit 33.9 (L) 35.0 - 47.0 %    MCV 78 78 - 100 fL    MCH 24.4 (L) 26.5 - 33.0 pg    MCHC 31.3 (L) 31.5 - 36.5 g/dL    RDW 15.6 (H) 10.0 - 15.0 %    Platelet Count 329 150 - 450 10e3/uL   Treponema Abs w Reflex to RPR " and Titer    Collection Time: 05/29/25 10:42 AM   Result Value Ref Range    Treponema Antibody Total Nonreactive Nonreactive   Adult Type and Screen    Collection Time: 05/29/25 10:42 AM   Result Value Ref Range    ABO/RH(D) O POS     Antibody Screen Negative Negative    SPECIMEN EXPIRATION DATE 6/1/2025 11:59:00 PM CDT    Hemoglobin    Collection Time: 05/30/25  5:21 AM   Result Value Ref Range    Hemoglobin 9.5 (L) 11.7 - 15.7 g/dL    MCV 78 78 - 100 fL     Recent Labs   Lab Test 05/29/25  1042   AS Negative     Recent Labs   Lab Test 05/30/25  0521 05/29/25  1042   HGB 9.5* 10.6*     Recent Labs   Lab Test 10/23/24  0907   RUQIGG No detectable antibody.       Kayla Lee MD

## 2025-05-30 NOTE — PLAN OF CARE
Problem: Adult Inpatient Plan of Care  Goal: Optimal Comfort and Wellbeing  Outcome: Progressing  Intervention: Provide Person-Centered Care  Recent Flowsheet Documentation  Taken 5/30/2025 2096 by Hanna Carlos RN  Trust Relationship/Rapport:   care explained   choices provided   questions encouraged   questions answered   thoughts/feelings acknowledged   empathic listening provided   Goal Outcome Evaluation:      Plan of Care Reviewed With: patient, spouse    Overall Patient Progress: improvingOverall Patient Progress: improving    VSS, uterus firm, bleeding light. Dressing intact with small amount of draining, no change on shift. Toradol and Tylenol given for pain. Patient able to walk to bathroom, does get lightheaded, but tolerated well. Up to shower.

## 2025-05-30 NOTE — LACTATION NOTE
This note was copied from a baby's chart.  Follow up Lactation Visit    Current age:  17  hours old    Gestational age at delivery:  40w0d     Diaper count (last 24 hours):  0 wet 3 soiled      Feedings (last 24 hours):  2 breast and 4 Human donor breastmilk 5-10 mL    Weight Loss:      Breastfeeding goals:  6-12 months    Past breastfeeding experience: none- prime     Maternal health risk that may affect breastfeeding:  PCOS, Obesity, Anxiety    Home Pump:      Breast assessment:  Breast: Round, Large, Symmetrical, and Soft , Nipple:Retracts with stimulation    Infant oral assessment:      Feeding assessment:  Floor nurse requested LC to see family to support latching, the floor nurse and mom could not get infant to latch.   Infant was skin 2 skin with dad and was cueing when I came into room, he had just bottle feed 10 mL.  Discussed with mom that now would be a great time to latch him since he was awake and cueing.   Mom has larger soft breast and when the breast tissue is sandwich the breast tissue the nipple inverts. Infant in football hold, with mom's hand away from areola so nipple would stay steven infant was able to latch he would suck and few short suck burst and sleep, slipping off nipple and need to be re-latched multiply times.  After 10 min or so, infant was moved to second breast.  His suck pattern continued to be the same.  We attempted with a nipple shield and the nipple shield did not really help him maintain suck pattern.           Visit Summary:  reviewed feeding plan nad nipple shield use and encouraged mom to pump for any supplement (floor nurse will set up and educate mom on how to use  the breastpump)     Feeding plan: feed infant about every 2-3 hours at least 8 times in 24 hours.   Offer breast use nipple shield if infant can not maintain latch.    Supplement   pump    Education:   [] [place] Feeding Patterns in the First Few Days/second Night   [] Maternal Risk Factors that Could Affect  Milk Supply  [x] Hand Expression  [] Stages of Milk Production  [] Feeding Cues  [] LPI Feeding Behaviors  [] LBW Feeding Behaviors  [x] Rousing Techniques  [] Benefits of Skin to Skin  [x] Breastfeeding Positions  [x] Tips to Maintain a Deep Latch  [] Nutritive vs Non-Nutritive Sucking   [] Gentle Breast Compressions  []  Weight Loss  [] How to Know When Baby is Getting Enough to Eat  [] Supplementation Methods  [] Type of Supplement  [x] Nipple Shield  [] Sore Nipples  [] Pacifier Use  [] Tight Frenulum  [] Breastfeeding Twins  [] Pumping Plan  [] Breastpump Education  [] Engorgement/Reverse Pressure Softening  [x] Inpatient Lactation Support  [] Outpatient Lactation Resources/Follow up    Follow up: LC will continue to follow up during hospital stay.

## 2025-05-30 NOTE — PROGRESS NOTES
Late entry d/t patient care.    2107: Epidural placed.    2120: Blood pressures noted to be low, 80s/40s. Phenylephrine given at 2122, 2127 and 2135.    2126: Prolonged decel observed on monitor. Pitocin stopped, reposition patient to right side, then left side. Called second RN for help, assisted patient to hands and knees at 2129. This was done with some difficulty as patient feeling very numb from epidural.     2141: Fourth dose of phenylephrine given for persistent low blood pressures. Prolonged decels observed and patient having difficulty repositioning d/t epidural numbness.    2144: BP 63/35, O2 observed to be in upper 80s, patient reports chest heaviness. MDA called to bedside for STAT assessment. HOB elevated and O2 started at 10 l/min. Patient responding to verbal cues, but somnolent.      2148: MDA at bedside for evaluation. Patient pale and ashen, does not open eyes, but able to respond to MDA's questions. MDA directed RN to give additional doses of phenylephrine, order placed for ephedrine if SBP less than 80. RN offered to call rapid response team and to give Narcan, MDA declined these interventions.     2152: MDA reviewed epidural doses given on pump, adjusted settings and turned pump off, instructed RN to turn pump back on when patient starts feeling contractions again. Dermatomes at axilla.     2204: Patient's vitals stabilizing, patient remains somnolent but responsive to verbal cues.     2220: CNM placed IUPC, FSE, and urethral catheter. SVE unchanged at 6/90/-1.

## 2025-05-30 NOTE — PROGRESS NOTES
Discussed getting up and walking to bathroom to remove parsons, patient asking for a little more rest and would be more willing to get up around 11-12.

## 2025-05-30 NOTE — PROGRESS NOTES
"RN at bedside to assess, patient reports she still feels painful contractions even with pitocin off and states \"I'm exhausted.\" RN reviewed options for pain control, including fentanyl and epidural, as well as non-pharmacological interventions such as a warm bath, that would allow patient to get better rest. Patient requesting an epidural. RN reviewed procedure with patient, patient voices understanding.   "

## 2025-05-30 NOTE — PROGRESS NOTES
IHOB at bedside, patient having recurrent long decels and having extreme difficulty moving and repositioning as she is very numb from a high epidural. SVE per CNM is unchanged at 6/80/-1. IHOB recommended  section, patient consents to procedure. Prepping patient for surgery now.

## 2025-05-30 NOTE — PROGRESS NOTES
"Labor Progress Note:    Patient Name:  Adrien Mcelroy  :      1990  MRN:      [id number]    Assessment:    34 year old  at 40w0d weeks gestation  Dysfunctional labor  FHR Category 2 Tracing with prolonged decelerations  Epidural with resulting hypotension  GBS negative  Rubella Non-Immune  Anxiety, stable  BMI 34.20 with more than expected weight gain     Plan:   -Stat consult to in-house OBGYN Dr. Lee for FHR declerations in setting of dysfunction labor and persistent hypotension s/p epidural. Dr. Lee in to discuss concerns with Adrien and Tue, recommending primary  section. Pt in agreement. Care transferred to [facility] at this time.     Subjective:  Report received and care assumed by Osiris Veliz CNM at 20:00. Adrien requested an epidural. DESI administered epidural. Hypotension noted (marie 63/35) and treated per [facility] orders. MDA to bedside 30 mn after epidural placement at RN request due to persistent hypotension and pt report of chest heaviness. After pt stabilized, parsons, IUPC and FSE inserted by this writer. Pitocin has not been restarted. Pt aware of concerns for FHR, BP and cervical dilation.     Objective:  /61   Temp 98  F (36.7  C) (Oral)   Resp 18   Ht 1.6 m (5' 3\")   Wt 104.3 kg (230 lb)   LMP 2024 (Exact Date)   SpO2 (!) 89%   BMI 40.74 kg/m      FHR: 140 baseline, moderate variability, 15 X 15 accels present, early and prolonged decels present. Any changes or trends over time?: no.  Contractions: q 5 minutes, 60-80 sec in duration, mild to palpation. Soft resting tone. Relaxation time: adequate. Excessive uterine activity: absent.   Cervix: 6cm, 80%, -1, mid. Caput present. MSAF present.     60 minutes on the date of the encounter doing chart review, review of test results, interpretation of tests, patient visit, and documentation    Provider:  CROW Pineda CNM    Date:  2025  Time:  10:52 PM      "

## 2025-05-30 NOTE — PROGRESS NOTES
"CNM Postpartum Progress Note    Patient Name:  Adrien Mcelroy  :      1990  MRN:      [id number]    Assessment:    34 year old year-old,    2 hours S/P primary C/S for failure to progress and fetal intolerance  Initiating breastfeeding    Plan:     - Encouraged rest, good nutrition and hydration and feeding baby on cue with assistance of nursing staff.   - Attempted to help get baby latched, baby alert and interested, but no successful latch at first attempt. Reassurance provided. Discussed manual expression and pumping and then spoon or cup feeding baby, patient agreeable.   - Discussed lactation visit tomorrow as desired.  - Reviewed patient under physician care, but with continued CNM support as needed/desired.     Subjective:  Adrien doing well a couple hours following her primary C/S. Notes that the whole labor and birth process was a lot and she is still processing, but so grateful that baby is here and that they are both healthy. Feeling very fatigued and hoping for a nap soon. Attempting to breastfeed baby, noting a couple of sucks here and there, but baby not really getting latched. Questions about how to get baby fed tonight if he doesn't get latched answered. Lue at bedside and supportive.     Objective:    /62   Temp 98.3  F (36.8  C) (Oral)   Resp 18   Ht 1.6 m (5' 3\")   Wt 104.3 kg (230 lb)   LMP 2024 (Exact Date)   SpO2 96%   Breastfeeding Unknown   BMI 40.74 kg/m      No physical exam    Total time spent on the date of this encounter doing: chart review, review of test results, patient visit, performing the physical exam, education, counseling, developing this plan of care, and documenting = 20 minutes.    Provider: CROW Richard, CNDEANA  [facility] - [facility]   "

## 2025-05-30 NOTE — OP NOTE
GA: 40w1d  GP:   Labor Complications: Dysfunctional Labor;Fetal Intolerance;Anesthetic Complications  EBL:   mL  Delivery QBL:    Delivery Type: , Low Transverse    Surgeon: Dr. Kayla Fierro    1 Minute 5 Minute 10 Minute   Apgar Totals: 8   9        Personel Present: ANNE PHOENIX;ANIKA JARVIS;LUIS LEW;ENRICO EDOUARD;KG MAHARAJ      Details     Pre-Op Diagnosis: 1. Intrauterine pregnancy at 40w1d  2. Category 2 fetal heart rate tracing  3. Ineffective contraction pattern   Post-Op Diagnosis: 1. same   Indications:  Fetal intolerance   Procedure:   , Low Transverse via   incision   Anesthesia: Epidural     Outcome: The procedure was completed successfully without complications.    Procedure:      The patient was taken to the operating room and placed on the operating room table in the dorsal supine position.  The patient was then positioned and anesthetic was per anesthesiologist.  The patient was then transferred to the supine position with left lateral tilt.  A aprsons catheter was placed and noted to be actively draining clear yellow urine.  SCDs were in place.  The abdomen was then prepped and draped in standard sterile fashion.          The patient's anesthetic was tested for adequacy.  Once adequacy was ensured, a surgical time out was performed.        A Pfannensteil skin incision was then made with a scalpel and carried through sharply to the underlying layer of fascia. The fascia was incised in the midline and the incision was extended laterally with blunt and sharp dissection with the Chandler scissors. The superior aspect of the fascial incision was then grasped with Kocher clamps, elevated, and the underlying rectus muscles were dissected off with a combination of blunt and sharp dissection. The rectus muscles were then  in the midline, and the peritoneum was identified and entered bluntly. The peritoneal incision was then extended superiorly and  inferiorly with good visualization of the bladder. An Cristóbal retractor was placed.         The lower uterine segment was incised in a transverse fashion with a scalpel. The uterine incision was then extended laterally in a blunt fashion with cephalocaudal traction. The bladder retractor was removed and the infant's head was delivered atraumatically followed by the body. The  was born with a spontaneous cry and good tone.  The cord was clamped and cut. The infant was handed off to the awaiting pediatric team.  A section of cord was retained.       The placenta was delivered with uterine massage.  The uterus was exteriorized, and cleared of all clots and debris. The uterine incision was then re-approximated with 0-Vicryl in a running locking fashion.  A second layer of imbrication was then placed also using 0-monocryl.  Hemostasis was obtained with judicious use of cautery.  The uterus was returned to the abdomen. The para-colic gutters were then cleared using a moistened laparotomy sponge. The uterine incision was inspected and excellent hemostasis was confirmed visualized.   The rectus muscles were inspected and any sites of bleeding were controlled using the Bovie apparatus.  The fascia was reapproximated with looped 0-PDS in a running fashion. The subcutaneous tissue was irrigated and inspected for bleeding.  Any sites of bleeding were controlled using the Bovie apparatus.  The subcutaneous tissue was re-approximated with 3-0 plain gut in a running fashion. The skin was closed with 3-0 Monocryl stratafix in a running subcuticular fashion.           The patient tolerated the procedure well. Sponge, lap, and needle counts were correct times two. Intravenous antibiotics were given prior to entering the operating room. The parsons catheter was inspected at the conclusion of the procedure and noted to be draining clear yellow urine.  The patient was taken to the recovery room in stable condition.

## 2025-05-30 NOTE — PLAN OF CARE
Problem: Adult Inpatient Plan of Care  Goal: Plan of Care Review  Description: The Plan of Care Review/Shift note should be completed every shift.  The Outcome Evaluation is a brief statement about your assessment that the patient is improving, declining, or no change.  This information will be displayed automatically on your shift  note.  Outcome: Progressing  Flowsheets (Taken 2025 0617)  Outcome Evaluation: Vitals are WDL, at 0457 assessment patient diaphoretic, pale. Cool cloth to forehead. Am Hgb down to 9.5 from 10.6. After about an hour, patient took some broth, and plans to eat some chicken and rice. Pain minimal, D5LR infusing. Barrientos in place, patent.  Plan of Care Reviewed With: patient  Overall Patient Progress: improving     Problem: Postpartum ( Delivery)  Goal: Hemostasis  Outcome: Progressing     Problem: Postpartum ( Delivery)  Goal: Fluid and Electrolyte Balance  Outcome: Progressing  Intervention: Monitor and Manage Fluid and Electrolyte Balance  Recent Flowsheet Documentation  Taken 2025 035 by Marian Ho RN  Fluid/Electrolyte Management: fluids provided     Problem: Postpartum ( Delivery)  Goal: Optimal Pain Control and Function  Outcome: Progressing   Goal Outcome Evaluation:      Plan of Care Reviewed With: patient    Overall Patient Progress: improvingOverall Patient Progress: improving    Outcome Evaluation: Vitals are WDL, at 0457 assessment patient diaphoretic, pale. Cool cloth to forehead. Am Hgb down to 9.5 from 10.6. After about an hour, patient took some broth, and plans to eat some chicken and rice. Pain minimal, D5LR infusing. Barrientos in place, patent. Marian Ho RN on 2025 at 6:29 AM

## 2025-05-30 NOTE — L&D DELIVERY NOTE
OB  Delivery Note      Adrien Mcelroy MRN# [id number]   Age: 34 year old YOB: 1990       GA: 40w1d  GP:   Labor Complications: Dysfunctional Labor;Fetal Intolerance;Anesthetic Complications  EBL:   mL  Delivery QBL:    Delivery Type: , Low Transverse  ROM to Delivery Time: (Delivered) Hours: 7 Minutes: 39     1 Minute 5 Minute 10 Minute   Apgar Totals: 8   9        Personel Present: BHAVNA YANEZ;ANIKA SHIELDS;DEE BURNS;ENRICO EDOUARD;KG MAHARAJ     Details    Pre-Op Diagnosis: 1. Intrauterine pregnancy at 40w1d  2. Category 2 fetal heart rate tracing  3. Ineffective contraction pattern   Post-Op Diagnosis: 1. same   Indications:  Fetal intolerance   Procedure:   , Low Transverse via   incision   Anesthesia: Epidural      Rashawn Mcelroy [[id number]]      Labor Events     labor?: No   steroids: None  Labor Type: Augmentation  Predominate monitoring during 1st stage: continuous electronic fetal monitoring       Rupture date/time: 25 15:53   Rupture type: Artificial Rupture of Membranes, Spontaneous Rupture of Membranes  Fluid color: Clear, Meconium  Fluid odor: Normal     Augmentation: Oxytocin, AROM  Augmentation date/time: 25 13:36   Indications for augmentation: Ineffective Contraction Pattern       Delivery/Placenta Date and Time      Delivery Date: 25 Delivery Time: 11:32 PM   Placenta Date/Time: 2025 11:34 PM  Delivering clinician: Kayla Lee MD   Other personnel present at delivery:  Provider Role   Bhavna Yanez, RN Registered Nurse   Anika Shields, RN Registered Nurse   Dee Burns, RN Registered Nurse   Enrico Edouard, APRN CNP Nurse Practitioner   Kg Maharaj, RT Respiratory Therapist             Vaginal Counts              Needles Suture Needles Sponges (RETIRED) Instruments   Initial counts       Added to count       Relief counts       Final counts               Placed during  "labor Accounted for at the end of labor   FSE Yes Yes   IUPC Yes Yes   Cervidil No NA                             Apgars    Living status: Living   1 Minute 5 Minute 10 Minute 15 Minute 20 Minute   Skin color: 0  1       Heart rate: 2  2       Reflex irritability: 2  2       Muscle tone: 2  2       Respiratory effort: 2  2       Total: 8  9       Apgars assigned by: KADEN MARIA CNP       Cord      Cord Complications: None               Delayed cord clamping?: Yes    Cord Clamping Delay (seconds): 31-60 seconds            Resuscitation    Methods: None   Care at Delivery: NNP Delivery Note    Asked by Dr. Lee to attend the delivery of this term, male infant with a gestational age of 40 0/7 weeks secondary to  for meconium fluid, category 2 tracing, & failure to progress.      Infant delivered by  with loose nuchal at 23:32 hours on 2025. Infant received delayed cord clamping for 60 seconds. Infant had spontaneous respirations at birth. He was placed on a warmer, dried, stimulated, and bulb suctioned at birth. Apgars were 8 at one minute and 9 at five minutes of age. Gross PE is WNL except for posterior caput and molding. Infant was shown to mother and father and will be transferred to the  nursery for further care.    CROW White CNP 2025, 11:43 PM       Bowman Measurements      Weight: 7 lb 13.2 oz Length: 1' 8.47\"     Head circumference: 34 cm           Labor Events and Shoulder Dystocia    Fetal Tracing Prior to Delivery: Category 2  Shoulder dystocia present?: Neg       Delivery (Maternal) (Provider to Complete) ([id number])    Episiotomy: None  Perineal lacerations: None    Repair suture: None       Blood Loss  Mother: Ardien Mcelroy #[id number]     Start of Mother's Information      Delivery Blood Loss   Intrapartum & Postpartum: 25 2325 - 25 002    Delivery Admission: 25 0913 - 25 0029         Intrapartum & Postpartum Delivery " Admission    Total Surgical QBL Blood Loss (mL) Hospital Encounter 488 mL 488 mL    Total  488 mL 488 mL               End of Mother's Information  Mother: Adrien Mcelroy #[id number]                Delivery - Provider to Complete ([id number])    Delivering clinician: Kayla Lee MD  Delivery Type (Choose the 1 that will go to the Birth History): , Low Transverse                          Priority: Urgent      Specifics: Primary nulliparous     Indications for : Fetal intolerance     For  or operative vaginal delivery, labor was most recently managed by (if not delivering provider): Leslie Ruiz CNM     Other personnel:  Provider Role   Bhavna Yanez RN Registered Nurse   Nubia Shields RN Registered Nurse   Dee Burns RN Registered Nurse   Mary Simon APRN CNP Nurse Practitioner   Maximo Hills,  Respiratory Therapist                    Placenta    Date/Time: 2025 11:34 PM  Removal: Expressed  Disposition: Pathology             Anesthesia    Method: Epidural                    Presentation and Position    Presentation: Vertex    Position: Middle Occiput Transverse                     Kayla Lee MD

## 2025-05-30 NOTE — LACTATION NOTE
"This note was copied from a baby's chart.  Initial Lactation Visit    Current age: 11 hours old then back to check in at 13 hours of age     Gestational age at delivery: 40w0d     Diaper count (last 24 hours): 0 wet, 3 soiled Meconium     Feedings(last 24 hours): 3 breast  and 2 Human donor breastmilk     Weight Loss: Not 24 hours of age     Breastfeeding goals:  6-12 months    Past breastfeeding experience: None    Labor and Delivery Events that may affect breastfeeding:      Maternal risk that may affect breastfeeding: Obesity    Home Pump: MomCozy    Breast assessment: Breast: Round and Symmetrical, Nipples: Retracts with stimulation    Infant oral assessment: Oral: Midline, Cupped, Visible frenulum, and Thin frenulum, Suck: Weak and Uncoordinated    Feeding assessment/Visit Summary: Infant was ready to breast feed on 's arrival. Infant was 11 hours of age at the time. Infant was sleepy but willing to breast able to get infant to latch and do some suckling at the breast. MOB reported that she felt a tugging at the nipple. A few swallows note at the breast. Hand expressed colostrum on a spoon and feed back. Then returned at 2 pm to help with a breast feeding and start MOB pumping. MOB stated,\" I can do it this feeding. I am just 2 tired can we do it later.\"  reported MOB statement to the primary nurse. Will follow up tomorrow.    Breastfeeding Plan:     Offer breast every 2-3 hours or sooner if baby is showing hunger cues  Massage breast to encourage milk flow   Strive for a deep and comfortable latch (Should feel like a tugging at the nipple)   Positioning reminders:  Line up baby's nose to nipple   Baby's chin should be tilted into the breast tissue and baby's nose gentle touching the breast   Ear, shoulder, hip, nice straight line   Chin of baby should not be resting on the baby's chest.   Your thumb lined up like baby's mustache, fingers under breast like a baby's beard  Baby's cheeks should be " touching breast equally on both sides   Switch sides when swallows slow, baby pauses lengthen and  breast compressions do not increase the swallows or activity at the breast   Pump for 15 mins after breastfeeding if offering supplementation     Education:   [x] Clare Feeding Patterns in the First Few Days/second Night  [x] Hand Expression  [x] Feeding Cues  [x] Benefits of Skin-to-Skin   [x] Breastfeeding Positions  [x] Tips to Maintain a Deep Latch     [x] Clare Weight Loss

## 2025-05-30 NOTE — PROGRESS NOTES
"Labor Progress Note  2025 7:22 PM     Patient Name:  Adrien Mcelroy  :      1990  MRN:      [id number]      Assessment:  34 year old  at 40w0d weeks gestation  Normal Labor  FHR Category 2 Tracing  GBS negative  Rubella Non-Immune  Anxiety, stable  BMI 34.20  More than expected weight gain     Plan:   -Dr. Lee, IHOB, called to bedside for repetitive late decelerations, available on unit as needed   -Maternal position changes and Pitocin turned off for intrauterine resuscitation   -Encourage position changes  -CNM support as needed  -Anticipate   -Will give report to SHARYN Villagomez CNM at     Subjective:  Adrien Mcelroy feeling increased pain with contractions but tolerating well with use of nitrous oxide.  remains at bedside and supportive.     Objective:  /81   Temp 98  F (36.7  C) (Axillary)   Resp 18   Ht 1.6 m (5' 3\")   Wt 104.3 kg (230 lb)   LMP 2024 (Exact Date)   SpO2 98%   BMI 40.74 kg/m       baseline, moderate variability accelerations present, late decelerations noted with 5 contractions with marie to   Uterine contractions: every 2-6 minutes  SVE: 7cm/90 % effaced /-1 station  Pitocin off to allow for intrauterine resuscitation     Provider: CROW Kelley, MICHELLE, ISREAL    Total time spent with patient:20 minutes, >50% time spent counseling and coordination of care.   "

## 2025-05-30 NOTE — PROGRESS NOTES
a persistent Category II fetal heart rate tracing for 30 minutes.    Category II Algorithm     Fetal heart rate and uterine activity reviewed with provider.    EFM interpretation suggests: absence of concern for metabolic acidemia due to: presence of accelerations and moderate variability. EFM suggests concern for interruption of the oxygen pathway due to:  late decelerations and prolonged decelerations..     Interventions to improve fetal oxygenation for a Category II tracing include: evaluate labor progress, IV fluid bolus , maternal positioning, and sterile vaginal exam    After discussion with provider:Plan team huddle in 30 minutes    Plan per provider / orders received for IHOB consult, IHOB currently in delivery.

## 2025-05-30 NOTE — PROGRESS NOTES
Obstetrics Post-Op Progress Note         Assessment and Plan:    Assessment: Adrien Mcelroy is a 34 year old  Post-operative day #1 s/p Low transverse primary  section doing well.     L&D complications: Acute blood loss anemia          Plan:   ACUTE BLOOD LOSS ANEMIA  - patients vital signs stable  - patient asymptomatic   - component of drop likely due to dilution with IV fluids being administered     Monitor wound for signs of infection  Pain control measures as needed  Anticipate discharge tomorrow           Interval History:   Doing well.  Pain is controlled.  No fevers.  No history of wound drainage, warmth or significant erythema.  Good appetite.  Denies chest pain, shortness of breath, nausea or vomiting.  Breastfeeding, but tired.             Physical Exam:   Temp: 98  F (36.7  C) Temp src: Oral BP: 106/62 Pulse: 66   Resp: 16 SpO2: 94 % O2 Device: None (Room air) Oxygen Delivery: 1/4 LPM  Vitals:    25 0928   Weight: 104.3 kg (230 lb)     Vital Signs with Ranges  Temp:  [97.6  F (36.4  C)-98.9  F (37.2  C)] 98  F (36.7  C)  Pulse:  [60-67] 66  Resp:  [15-18] 16  BP: ()/() 106/62  SpO2:  [85 %-100 %] 94 %  I/O last 3 completed shifts:  In: 1250 [I.V.:1000; IV Piggyback:250]  Out: 1685 [Urine:1025; Blood:660]    Uterine fundus is firm, non-tender and at the level of the umbilicus  Incision C/D/I - covered          Data:     Recent Results (from the past 24 hours)   CBC with platelets    Collection Time: 25 10:42 AM   Result Value Ref Range    WBC Count 11.8 (H) 4.0 - 11.0 10e3/uL    RBC Count 4.35 3.80 - 5.20 10e6/uL    Hemoglobin 10.6 (L) 11.7 - 15.7 g/dL    Hematocrit 33.9 (L) 35.0 - 47.0 %    MCV 78 78 - 100 fL    MCH 24.4 (L) 26.5 - 33.0 pg    MCHC 31.3 (L) 31.5 - 36.5 g/dL    RDW 15.6 (H) 10.0 - 15.0 %    Platelet Count 329 150 - 450 10e3/uL   Treponema Abs w Reflex to RPR and Titer    Collection Time: 25 10:42 AM   Result Value Ref Range    Treponema Antibody  Total Nonreactive Nonreactive   Adult Type and Screen    Collection Time: 05/29/25 10:42 AM   Result Value Ref Range    ABO/RH(D) O POS     Antibody Screen Negative Negative    SPECIMEN EXPIRATION DATE 6/1/2025 11:59:00 PM CDT    Hemoglobin    Collection Time: 05/30/25  5:21 AM   Result Value Ref Range    Hemoglobin 9.5 (L) 11.7 - 15.7 g/dL    MCV 78 78 - 100 fL     Recent Labs   Lab Test 05/29/25  1042   AS Negative     Recent Labs   Lab Test 05/30/25  0521 05/29/25  1042   HGB 9.5* 10.6*     Recent Labs   Lab Test 10/23/24  0907   RUQIGG No detectable antibody.       Taylor Cardenas, DO

## 2025-05-30 NOTE — PROGRESS NOTES
"[facility] Labor and Delivery Progress Note    Adrien Mcelroy MRN# [id number]   Age: 34 year old YOB: 1990           Subjective:   Pt feeling weak and very fatigued           Objective:   Patient Vitals for the past 24 hrs:   BP Temp Temp src Resp SpO2 Height Weight BMI (Calculated) Oximeter Heart Rate   25 2135 -- 98.4  F (36.9  C) Oral -- -- -- -- -- --   25 1803 -- -- -- -- 98 % -- -- -- 70 bpm   25 1800 -- 98.5  F (36.9  C) Axillary -- -- -- -- -- --   25 1757 122/81 -- -- -- -- -- -- -- 75 bpm   25 1656 109/62 -- -- 18 -- -- -- -- 64 bpm   25 1654 -- 98  F (36.7  C) Axillary -- -- -- -- -- --   25 1556 115/75 97.8  F (36.6  C) Oral -- -- -- -- -- 80 bpm   25 1500 108/59 -- -- 16 99 % -- -- -- 73 bpm   25 1400 114/60 98  F (36.7  C) Oral 18 99 % -- -- -- 70 bpm   25 1350 -- -- -- -- 99 % -- -- -- 82 bpm   25 1310 -- -- -- -- 98 % -- -- -- 75 bpm   25 1300 -- -- -- -- 100 % -- -- -- 84 bpm   25 1250 -- -- -- -- 98 % -- -- -- 71 bpm   25 1240 -- -- -- -- 98 % -- -- -- 81 bpm   25 1230 -- -- -- -- 97 % -- -- -- 65 bpm   25 1220 -- -- -- -- 99 % -- -- -- 68 bpm   25 1210 117/63 97.6  F (36.4  C) Oral 18 98 % -- -- -- 78 bpm   25 0928 (!) 108/99 98.2  F (36.8  C) Oral 15 -- 1.6 m (5' 3\") 104.3 kg (230 lb) 40.74 86 bpm         Cervical Exam: 7 / 90% / -1      unchanged    Membranes: Ruptured      Fetal Heart Rate:    Monitor: noa    Variability: moderate (amplitude range 6 to 25 bpm)    Baseline Rate: normal range    Fetal Heart Rate Tracing: category 2  Recurrent prolonged decelerations with multiple contractions-difficulty with hypotension           Assessment:   Adrien Mcelroy is a 34 year old  who is 40w0d here with hypotension unresponsive to repeated treatments after epidural and no cervical change over the last several hours. Unable to restart pitocin.          Plan:   Urgent " c/s      Kayla Lee MD          Called anesthesia @[phone] for urgent c/s due to fetal intolerance.

## 2025-05-30 NOTE — PROGRESS NOTES
a persistent Category II fetal heart rate tracing for 40 minutes.    Category II Algorithm     Fetal heart rate and uterine activity reviewed with provider.    EFM interpretation suggests: absence of concern for metabolic acidemia due to: presence of accelerations and moderate variability. EFM suggests concern for interruption of the oxygen pathway due to:  variable decelerations, late decelerations, and prolonged decelerations..     Interventions to improve fetal oxygenation for a Category II tracing include: continue monitoring, evaluate labor progress, IV fluid bolus , maternal positioning, oxytocin discontinued, and sterile vaginal exam    After discussion with provider:Plan team huddle in 60 minutes    Plan per provider / orders received for keep pitocin off, give IV fluid boluses and reposition as needed

## 2025-05-31 VITALS
WEIGHT: 229.9 LBS | RESPIRATION RATE: 18 BRPM | SYSTOLIC BLOOD PRESSURE: 103 MMHG | HEIGHT: 63 IN | DIASTOLIC BLOOD PRESSURE: 57 MMHG | BODY MASS INDEX: 40.73 KG/M2 | TEMPERATURE: 98.6 F | HEART RATE: 79 BPM | OXYGEN SATURATION: 98 %

## 2025-05-31 PROCEDURE — 250N000011 HC RX IP 250 OP 636: Performed by: OBSTETRICS & GYNECOLOGY

## 2025-05-31 PROCEDURE — 250N000011 HC RX IP 250 OP 636

## 2025-05-31 PROCEDURE — 250N000013 HC RX MED GY IP 250 OP 250 PS 637: Performed by: OBSTETRICS & GYNECOLOGY

## 2025-05-31 PROCEDURE — 90707 MMR VACCINE SC: CPT | Performed by: OBSTETRICS & GYNECOLOGY

## 2025-05-31 PROCEDURE — 90471 IMMUNIZATION ADMIN: CPT | Performed by: OBSTETRICS & GYNECOLOGY

## 2025-05-31 RX ORDER — ACETAMINOPHEN 325 MG/1
975 TABLET ORAL EVERY 6 HOURS
COMMUNITY
Start: 2025-05-31

## 2025-05-31 RX ORDER — IBUPROFEN 200 MG
600 TABLET ORAL EVERY 6 HOURS
COMMUNITY
Start: 2025-05-31

## 2025-05-31 RX ADMIN — MEASLES, MUMPS, AND RUBELLA VIRUS VACCINE LIVE 0.5 ML: 1000; 12500; 1000 INJECTION, POWDER, LYOPHILIZED, FOR SUSPENSION SUBCUTANEOUS at 12:03

## 2025-05-31 RX ADMIN — POLYETHYLENE GLYCOL 3350 17 G: 17 POWDER, FOR SOLUTION ORAL at 09:12

## 2025-05-31 RX ADMIN — ACETAMINOPHEN 975 MG: 325 TABLET ORAL at 01:13

## 2025-05-31 RX ADMIN — FAMOTIDINE 20 MG: 10 INJECTION, SOLUTION INTRAVENOUS at 01:14

## 2025-05-31 RX ADMIN — IBUPROFEN 800 MG: 800 TABLET, FILM COATED ORAL at 11:57

## 2025-05-31 RX ADMIN — IBUPROFEN 800 MG: 800 TABLET, FILM COATED ORAL at 05:54

## 2025-05-31 RX ADMIN — ACETAMINOPHEN 975 MG: 325 TABLET ORAL at 11:57

## 2025-05-31 RX ADMIN — SENNOSIDES AND DOCUSATE SODIUM 2 TABLET: 50; 8.6 TABLET ORAL at 09:12

## 2025-05-31 ASSESSMENT — ACTIVITIES OF DAILY LIVING (ADL)
ADLS_ACUITY_SCORE: 22
ADLS_ACUITY_SCORE: 21
ADLS_ACUITY_SCORE: 22
ADLS_ACUITY_SCORE: 21
ADLS_ACUITY_SCORE: 22
ADLS_ACUITY_SCORE: 21
ADLS_ACUITY_SCORE: 22
ADLS_ACUITY_SCORE: 22
ADLS_ACUITY_SCORE: 21

## 2025-05-31 NOTE — PROGRESS NOTES
Obstetrics Post-Op Progress Note         Assessment and Plan:    Assessment: Adrien Mcelroy is a 34 year old  Post-operative day #2 s/p Low transverse primary  section doing well.     L&D complications: Non-reassuring fetal heart tones complicating pregnancy, antepartum [O36.8390]          Plan:   Ambulation encouraged  Pain control measures as needed  Discharge later today           Interval History:   Doing well.  Pain is well-controlled.  No fevers.   Good appetite.  Denies chest pain, shortness of breath, nausea or vomiting.  Ambulatory.  Breastfeeding well. Voiding without difficulty.  Passing gas. Has not yet had a BM. Wants to discharge today. Has tylenol and ibuprofen at home already.              Physical Exam:   Temp: 98.6  F (37  C) Temp src: Oral BP: 103/57 Pulse: 79   Resp: 18 SpO2: 98 % O2 Device: None (Room air)    Vitals:    25 0928   Weight: 104.3 kg (230 lb)     Vital Signs with Ranges  Temp:  [98  F (36.7  C)-98.6  F (37  C)] 98.6  F (37  C)  Pulse:  [70-86] 79  Resp:  [16-24] 18  BP: (101-112)/(56-61) 103/57  SpO2:  [93 %-98 %] 98 %  I/O last 3 completed shifts:  In: -   Out: 1200 [Urine:1200]    Uterine fundus is firm, non-tender and at the level of the umbilicus  Incision C/D/I with bandage  Extremities Non-tender          Data:   No results found for this or any previous visit (from the past 24 hours).  Recent Labs   Lab Test 25  1042   AS Negative     Recent Labs   Lab Test 25  0521 25  1042   HGB 9.5* 10.6*     Recent Labs   Lab Test 10/23/24  0907   RUQIGG No detectable antibody.       Ami Levy MD

## 2025-05-31 NOTE — PLAN OF CARE
Goal Outcome Evaluation: Met  Problem: Postpartum ( Delivery)  Goal: Successful Parent Role Transition  Outcome: Adequate for Care Transition  Intervention: Support Parent Role Transition  Recent Flowsheet Documentation  Taken 2025 by Prema Johnson RN  Supportive Measures:   self-care encouraged   verbalization of feelings encouraged   positive reinforcement provided  Parent-Child Attachment Promotion:   caring behavior modeled   cue recognition promoted   interaction encouraged  Goal: Hemostasis  Outcome: Adequate for Care Transition  Goal: Effective Bowel Elimination  Outcome: Adequate for Care Transition  Intervention: Enhance Bowel Motility and Elimination  Recent Flowsheet Documentation  Taken 2025 by Prema Johnson RN  Bowel Motility Enhancement:   ambulation promoted   fluid intake encouraged  Bowel Elimination Promotion:   adequate fluid intake promoted   ambulation promoted  Goal: Fluid and Electrolyte Balance  Outcome: Adequate for Care Transition  Intervention: Monitor and Manage Fluid and Electrolyte Balance  Recent Flowsheet Documentation  Taken 2025 by Prema Johnson RN  Fluid/Electrolyte Management: fluids provided  Goal: Absence of Infection Signs and Symptoms  Outcome: Adequate for Care Transition  Goal: Anesthesia/Sedation Recovery  Outcome: Adequate for Care Transition  Goal: Optimal Pain Control and Function  Outcome: Adequate for Care Transition  Intervention: Prevent or Manage Pain  Recent Flowsheet Documentation  Taken 2025 by Prema Johnson RN  Pain Management Interventions: medication offered but refused  Perineal Care:   absorbent brief/pad changed   perineum cleansed  Goal: Nausea and Vomiting Relief  Outcome: Adequate for Care Transition  Goal: Effective Urinary Elimination  Outcome: Adequate for Care Transition  Intervention: Monitor and Manage Urinary Retention  Recent Flowsheet Documentation  Taken 2025 by Prema Johnson RN  Urinary  Elimination Promotion: frequent voiding encouraged  Goal: Effective Oxygenation and Ventilation  Outcome: Adequate for Care Transition     Problem: Breastfeeding  Goal: Effective Breastfeeding  Outcome: Adequate for Care Transition  Intervention: Promote Breast Care and Comfort  Recent Flowsheet Documentation  Taken 5/31/2025 0918 by Prema Johnson RN  Breast Care: Breastfeeding: open to air  Breast Care: double electric breast pump utilized  Intervention: Promote Effective Breastfeeding  Recent Flowsheet Documentation  Taken 5/31/2025 0918 by rPema Johnson RN  Breastfeeding Assistance: support offered  Parent-Child Attachment Promotion:   caring behavior modeled   cue recognition promoted   interaction encouraged  Intervention: Support Exclusive Breastfeeding Success  Recent Flowsheet Documentation  Taken 5/31/2025 0918 by Prema Johnson RN  Supportive Measures:   self-care encouraged   verbalization of feelings encouraged   positive reinforcement provided     Problem: Adult Inpatient Plan of Care  Goal: Plan of Care Review  Description: The Plan of Care Review/Shift note should be completed every shift.  The Outcome Evaluation is a brief statement about your assessment that the patient is improving, declining, or no change.  This information will be displayed automatically on your shift  note.  Outcome: Adequate for Care Transition  Flowsheets (Taken 5/31/2025 1501)  Outcome Evaluation: discharge to home criteria met, vital signs and assessment WNL, up ad mykel ambulating independently, incision clean dry and intact with silver dressing to stay for 1 week, instructed on care and dressing removal in 1 week and call MD if signs and symptoms of infection.  Adequate oral food and fluid intake, voiding well, passing flatus but no bowel movement yet, on bowel regimen.  Discharge AVS reviewed and discussed with patient and significant other and verbalized understanding, questions answered to satisfaction.  Discharge  "teachings reviewed and discussed and verbalized understanding.  Plan of Care Reviewed With:   patient   significant other  Overall Patient Progress: improving  Goal: Patient-Specific Goal (Individualized)  Description: You can add care plan individualizations to a care plan. Examples of Individualization might be:  \"Parent requests to be called daily at 9am for status\", \"I have a hard time hearing out of my right ear\", or \"Do not touch me to wake me up as it startles  me\".  Outcome: Adequate for Care Transition  Goal: Absence of Hospital-Acquired Illness or Injury  Outcome: Adequate for Care Transition  Intervention: Prevent Skin Injury  Recent Flowsheet Documentation  Taken 5/31/2025 0918 by Prema Johnson RN  Body Position: position changed independently  Intervention: Prevent and Manage VTE (Venous Thromboembolism) Risk  Recent Flowsheet Documentation  Taken 5/31/2025 0918 by Prema Johnson RN  VTE Prevention/Management: (up ad mykel independent)   SCDs off (sequential compression devices)   other (see comments)  Intervention: Prevent Infection  Recent Flowsheet Documentation  Taken 5/31/2025 0918 by Prema Johnson RN  Infection Prevention:   cohorting utilized   hand hygiene promoted   rest/sleep promoted   single patient room provided  Goal: Optimal Comfort and Wellbeing  Outcome: Adequate for Care Transition  Intervention: Monitor Pain and Promote Comfort  Recent Flowsheet Documentation  Taken 5/31/2025 0918 by Prema Johnson RN  Pain Management Interventions: medication offered but refused  Intervention: Provide Person-Centered Care  Recent Flowsheet Documentation  Taken 5/31/2025 0918 by Prema Johnson RN  Trust Relationship/Rapport:   care explained   choices provided   reassurance provided   thoughts/feelings acknowledged  Goal: Readiness for Transition of Care  Outcome: Adequate for Care Transition         Plan of Care Reviewed With: patient, significant other    Overall Patient Progress: improvingOverall " Patient Progress: improving    Outcome Evaluation: discharge to home criteria met, vital signs and assessment WNL, up ad mykel ambulating independently, incision clean dry and intact with silver dressing to stay for 1 week, instructed on care and dressing removal in 1 week and call MD if signs and symptoms of infection.  Adequate oral food and fluid intake, voiding well, passing flatus but no bowel movement yet, on bowel regimen.  Discharge AVS reviewed and discussed with patient and significant other and verbalized understanding, questions answered to satisfaction.  Discharge teachings reviewed and discussed and verbalized understanding.

## 2025-05-31 NOTE — PLAN OF CARE
Problem: Breastfeeding  Goal: Effective Breastfeeding  Outcome: Progressing  Intervention: Promote Breast Care and Comfort  Recent Flowsheet Documentation  Taken 2025 1730 by Kaley Carl RN  Breast Care: double electric breast pump utilized  Intervention: Promote Effective Breastfeeding  Recent Flowsheet Documentation  Taken 2025 1730 by Kaley Carl RN  Parent-Child Attachment Promotion:   caring behavior modeled   cue recognition promoted   interaction encouraged  Intervention: Support Exclusive Breastfeeding Success  Recent Flowsheet Documentation  Taken 2025 1730 by Kaley Carl RN  Supportive Measures:   self-care encouraged   verbalization of feelings encouraged   positive reinforcement provided     Problem: Postpartum ( Delivery)  Goal: Successful Parent Role Transition  Outcome: Progressing  Intervention: Support Parent Role Transition  Recent Flowsheet Documentation  Taken 2025 1730 by Kaley Carl RN  Supportive Measures:   self-care encouraged   verbalization of feelings encouraged   positive reinforcement provided  Parent-Child Attachment Promotion:   caring behavior modeled   cue recognition promoted   interaction encouraged     Problem: Postpartum ( Delivery)  Goal: Optimal Pain Control and Function  Outcome: Progressing  Intervention: Prevent or Manage Pain  Recent Flowsheet Documentation  Taken 2025 by Kaley Carl RN  Pain Management Interventions: medication (see MAR)   Goal Outcome Evaluation:      Plan of Care Reviewed With: patient    Overall Patient Progress: improvingOverall Patient Progress: improving    Outcome Evaluation: VSS, postpartum assessments WDL      VSS, postpartum assessments WDL.  Pain controlled with scheduled medication.  Encouraged fluid intake to meet voiding goals.  Breastfeeding and supplementing with donor milk. Lactation seen today. Doing well with football hold.  Incision  dressing intact with no new drainage.  Positive attachment behaviors observed and responding to infant cues appropriately.

## 2025-05-31 NOTE — DISCHARGE SUMMARY
Discharge Summary    Adrine Mcelroy MRN# [id number]   Age: 34 year old YOB: 1990     Date of Admission:  2025  Date of Discharge::  2025  Admitting Physician:  Lynn Mann CNM  Discharge Physician:  Ami Levy MD     Home clinic: [facility]          Admission Diagnoses:   Supervision of high risk pregnancy, antepartum [O09.90]  Rubella non-immune status, antepartum [O09.899, Z28.39]  Intrauterine pregnancy at 40w0d          Discharge Diagnosis:   Same   Acute blood loss anemia, asymptomatic  S/p  delivery          Procedures:     Procedure(s): Low transverse   delivery via Pfannenstiel incision              Medications Prior to Admission:     Medications Prior to Admission   Medication Sig Dispense Refill Last Dose/Taking    calcium carbonate (TUMS) 500 MG chewable tablet Take 1 tablet (500 mg) by mouth 2 times daily. 90 tablet 1     EPINEPHrine (ANY BX GENERIC EQUIV) 0.3 MG/0.3ML injection 2-pack Inject 0.3 mLs (0.3 mg) into the muscle as needed 2 each 0     omeprazole (PRILOSEC) 20 MG DR capsule Take 1 capsule (20 mg) by mouth daily. 90 capsule 0     SENNA-docusate sodium (SENNA S) 8.6-50 MG tablet Take 1 tablet by mouth at bedtime. 90 tablet 2     VITAMIN D PO Take by mouth.       [DISCONTINUED] aspirin (ASA) 81 MG chewable tablet Take 1 tablet (81 mg) by mouth daily. 90 tablet 3              Discharge Medications:        Review of your medicines        START taking        Dose / Directions   acetaminophen 325 MG tablet  Commonly known as: TYLENOL      Dose: 975 mg  Take 3 tablets (975 mg) by mouth every 6 hours.  Refills: 0     ibuprofen 200 MG tablet  Commonly known as: ADVIL/MOTRIN      Dose: 600 mg  Take 3 tablets (600 mg) by mouth every 6 hours.  Refills: 0            CONTINUE these medicines which have NOT CHANGED        Dose / Directions   calcium carbonate 500 MG chewable tablet  Commonly known as: TUMS  Used for: Supervision of high risk  pregnancy, antepartum, first trimester      Dose: 1 chew tab  Take 1 tablet (500 mg) by mouth 2 times daily.  Quantity: 90 tablet  Refills: 1     EPINEPHrine 0.3 MG/0.3ML injection 2-pack  Commonly known as: ANY BX GENERIC EQUIV  Used for: Anaphylaxis, initial encounter      Dose: 0.3 mg  Inject 0.3 mLs (0.3 mg) into the muscle as needed  Quantity: 2 each  Refills: 0     omeprazole 20 MG DR capsule  Commonly known as: PriLOSEC  Used for: Supervision of high risk pregnancy, antepartum, first trimester      Dose: 20 mg  Take 1 capsule (20 mg) by mouth daily.  Quantity: 90 capsule  Refills: 0     SENNA-docusate sodium 8.6-50 MG tablet  Commonly known as: SENNA S  Used for: Supervision of high risk pregnancy, antepartum, first trimester      Dose: 1 tablet  Take 1 tablet by mouth at bedtime.  Quantity: 90 tablet  Refills: 2     VITAMIN D PO      Take by mouth.  Refills: 0            STOP taking      aspirin 81 MG chewable tablet  Commonly known as: ASA                  Where to get your medicines        Some of these will need a paper prescription and others can be bought over the counter. Ask your nurse if you have questions.    You don't need a prescription for these medications  acetaminophen 325 MG tablet  ibuprofen 200 MG tablet               Consultations:   In House [facility]/ [facility]           Brief History of Labor:   Adrien Mcelroy is a 34 year old  who presented  in labor at 40w0d            Hospital Course:   The patient's hospital course was notable for NRFHTS which required a  section. She recovered as anticipated and experienced no post-operative complications.   On discharge, her pain was well controlled.  Vaginal bleeding is similar to peak menstrual flow.  Voiding without difficulty.  Ambulating well and tolerating a normal diet.  No fever or significant wound drainage.  Breastfeeding.  Infant is stable.  She was discharged on post-partum day #2. She will resume her prenatal with  "iron.    Post-partum hemoglobin:   Hemoglobin   Date Value Ref Range Status   05/30/2025 9.5 (L) 11.7 - 15.7 g/dL Final       Day of discharge assessment:   /57 (BP Location: Right arm)   Pulse 79   Temp 98.6  F (37  C) (Oral)   Resp 18   Ht 1.6 m (5' 3\")   Wt 104.3 kg (230 lb)   LMP 08/13/2024 (Exact Date)   SpO2 98%   Breastfeeding Unknown   BMI 40.74 kg/m    Abdomen: Soft, fundus firm, incision with bandage          Discharge Instructions and Follow-Up:     Discharge diet: Regular   Discharge activity: Your activity upon discharge: no lifting >15 lbs or strenuous exercise for 6 weeks, no driving for 2 weeks or until you can slam on the breaks w/o pain, nothing in the vagina for 6 weeks (no tampons, intercourse, douching).    Discharge follow-up: Two weeks for incision check  6 weeks for postpartum visit.   Wound care: Remove bandage 7 days after surgery  Keep incision clean and dry.           Discharge Disposition:     Discharged to home      Attestation:  I have reviewed today's vital signs, notes, medications, labs and imaging.  Amount of time performed on this discharge summary: 20 minutes.  Total time: 30 minutes    Ami Levy MD           "

## 2025-05-31 NOTE — DISCHARGE INSTRUCTIONS
Warning Signs after Having a Baby    Keep this paper on your fridge or somewhere else where you can see it.    Call your provider if you have any of these symptoms up to 12 weeks after having your baby.    Thoughts of hurting yourself or your baby  Pain in your chest or trouble breathing  Severe headache not helped by pain medicine  Eyesight concerns (blurry vision, seeing spots or flashes of light, other changes to eyesight)  Fainting, shaking or other signs of a seizure    Call 9-1-1 if you feel that it is an emergency.     The symptoms below can happen to anyone after giving birth. They can be very serious. Call your provider if you have any of these warning signs.    My provider s phone number: _______________________    Losing too much blood (hemorrhage)    Call your provider if you soak through a pad in less than an hour or pass blood clots bigger than a golf ball. These may be signs that you are bleeding too much.    Blood clots in the legs or lungs    After you give birth, your body naturally clots its blood to help prevent blood loss. Sometimes this increased clotting can happen in other areas of the body, like the legs or lungs. This can block your blood flow and be very dangerous.     Call your provider if you:  Have a red, swollen spot on the back of your leg that is warm or painful when you touch it.   Are coughing up blood.     Infection    Call your provider if you have any of these symptoms:  Fever of 100.4 F (38 C) or higher.  Pain or redness around your stitches if you had an incision.   Any yellow, white, or green fluid coming from places where you had stitches or surgery.    Mood Problems (postpartum depression)    Many people feel sad or have mood changes after having a baby. But for some people, these mood swings are worse.     Call your provider right away if you feel so anxious or nervous that you can't care for yourself or your baby.    Preeclampsia (high blood pressure)    Even if you  didn't have high blood pressure when you were pregnant, you are at risk for the high blood pressure disease called preeclampsia. This risk can last up to 12 weeks after giving birth.     Call your provider if you have:   Pain on your right side under your rib cage  Sudden swelling in the hands and face    Remember: You know your body. If something doesn't feel right, get medical help.     For informational purposes only. Not to replace the advice of your health care provider. Copyright 2020 [facility]. All rights reserved. Clinically reviewed by Stephanie Bruce, RNC-OB, MSN. [place] [id number] - Rev 02/23.

## 2025-05-31 NOTE — PLAN OF CARE
Goal Outcome Evaluation:      Plan of Care Reviewed With: patient    Overall Patient Progress: improving    Outcome Evaluation: Adrien's vital signs and OB assessments WNL. Fundus is firm, bleeding is scant, dressing has old marked drainage. Pain controlled with scheduled tylenol and ibuprofen. Ambulating and voiding independently. Needs encouragement to pump. Attentive to infant's needs.    Hermelinda Todd RN on 5/31/2025 at 5:03 AM

## 2025-05-31 NOTE — PROGRESS NOTES
IMCHELLE Courtesy Round:    Patient Name:  Adrien Mcelroy  :      1990  MRN:      [id number]      Assessment:   Day 2 postpartum, s/p  for NRFHT's .  CNM courtesy round.    Plan:    -Discussed that patient is under physician management with CNM support as needed.     -Encouraged scheduling lactation visit for next week.     -Reviewed 6 week postpartum visit with midwives.    Subjective:  Integrating birth experience. Please with her care. Pain is well controlled with current medications. Baby is breast feeding on cue. Support at home identified partner, friends, and family.  Patient has a history of depression/anxiety. Feeling somewhat anxious about going home. Reviewed resources and support for mental health.      Objective:  No exam. Courtesy round only.     Provider:  Ashley Blackburn CNM  Date:  2025  Time:  11:04 AM

## 2025-05-31 NOTE — LACTATION NOTE
"This note was copied from a baby's chart.  Follow up Lactation Visit    Current age:  35 hours old    Gestational age at delivery:  40w0d     Feedings (last 24 hours):  4 breast and 8 Human donor breastmilk 10 -20 mL    Weight Loss:  2%     Breastfeeding goals:  6-12 months    Past breastfeeding experience: no, prime    Labor and Delivery Events that may affect breastfeeding:      Maternal health risk that may affect breastfeeding:  PCOS, Obesity, Anxiety, Depression    Home Pump:  MomCozy    Breast assessment:  Breast: Round, Large, Symmetrical, and Soft , Nipple: Everted with stimulation, slightly tender    Visit Summary:  LC to patient room to offer support and assist with feeding. Mother was assisted with \"football\" positioning at breast. A fair latch was achieved with a few audible swallows. Prosper was fussing off/on at breast. Mother was assisted with hand expression to settle infant, however,  continued to be fussy at breast.  was settled with a supplement of HDM  (paced bottle feeding demonstrated), and put back to breast. The feeding plan (below) reviewed. Techniques to keep  active at breast, including deep breast compressions demonstrated. Questions addressed. OP resources reviewed. A list of donor milk bank sites was provided. Encouragement and reassurance provided. Silicone comfort gels and nipple cream provided.    Feeding Plan:   Feed every 2-3 hours.   Keep breastfeeding efficient. If baby does not latch within 5-10 minutes, sleepy at breast, or not transferring milk then end the breastfeeding attempt and supplement   Positioning reminders:  line up baby's nose to nipple   ear, shoulder, hip, nice straight line   chin off bay's chest; chin touching your breast prior to latch  your thumb lined up like baby's mustache, fingers under breast like a baby's beard  cheeks touching breast  Signs of milk transfer:   Rhythmic sucking and swallowing  Comfortable latch with strong " pulls  Meeting output goals (PNCB Page 44)   Supplement/Pump  Supplement using breastmilk, human donor milk and/or formula after every breastfeeding attempt. Follow guideline below for volumes and give more as baby cues.   0-48 hours 5-15 ml each feeding  48-72 hours 15-30 ml each feeding  72-96 hours 30-60 ml each feeding  Pump for 15-20 minutes to promote and stimulate milk productions       Education:   [x]  Feeding Patterns in the First Few Days/second Night   [x] Maternal Risk Factors that Could Affect Milk Supply  [x] Hand Expression  [x] Stages of Milk Production  [x] Feeding Cues  [] LPI Feeding Behaviors  [] LBW Feeding Behaviors  [x] Rousing Techniques  [] Benefits of Skin to Skin  [x] Breastfeeding Positions  [x] Tips to Maintain a Deep Latch  [x] Nutritive vs Non-Nutritive Sucking   [x] Gentle Breast Compressions  [x]  Weight Loss  [x] How to Know When Baby is Getting Enough to Eat  [x] Supplementation & Methods (including Paced Bottle Feeding)  [x] Type of Supplement  [x] Nipple Shield  [x] Sore Nipples  [x] Pacifier Use  [] Tight Frenulum  [] Breastfeeding Twins  [x] Pumping Plan  [x] Breastpump Education  [x] Engorgement/Reverse Pressure Softening  [x] Inpatient Lactation Support  [x] Outpatient Lactation Resources/Follow up    Follow up: LC will continue to follow up during hospital stay.

## 2025-06-02 ENCOUNTER — PATIENT OUTREACH (OUTPATIENT)
Dept: FAMILY MEDICINE | Facility: CLINIC | Age: 35
End: 2025-06-02
Payer: COMMERCIAL

## 2025-06-02 LAB
PATH REPORT.COMMENTS IMP SPEC: NORMAL
PATH REPORT.COMMENTS IMP SPEC: NORMAL
PATH REPORT.FINAL DX SPEC: NORMAL
PATH REPORT.GROSS SPEC: NORMAL
PATH REPORT.MICROSCOPIC SPEC OTHER STN: NORMAL
PATH REPORT.RELEVANT HX SPEC: NORMAL
PHOTO IMAGE: NORMAL

## 2025-06-02 NOTE — TELEPHONE ENCOUNTER
Chart reviewed for TCM outreach post hospital discharge.  Patient was admitted to L&D and underwent a .  L&D staff reach out to patients for postpartum follow up, therefore, no outreach made by clinic RN.          Lorena Anderson RN  [facility]

## 2025-06-03 ENCOUNTER — MEDICAL CORRESPONDENCE (OUTPATIENT)
Dept: HEALTH INFORMATION MANAGEMENT | Facility: CLINIC | Age: 35
End: 2025-06-03
Payer: COMMERCIAL

## 2025-07-10 ENCOUNTER — PRENATAL OFFICE VISIT (OUTPATIENT)
Dept: MIDWIFE SERVICES | Facility: CLINIC | Age: 35
End: 2025-07-10
Payer: COMMERCIAL

## 2025-07-10 VITALS
WEIGHT: 206.5 LBS | DIASTOLIC BLOOD PRESSURE: 72 MMHG | SYSTOLIC BLOOD PRESSURE: 104 MMHG | HEART RATE: 68 BPM | HEIGHT: 63 IN | BODY MASS INDEX: 36.59 KG/M2

## 2025-07-10 DIAGNOSIS — D50.8 IRON DEFICIENCY ANEMIA SECONDARY TO INADEQUATE DIETARY IRON INTAKE: ICD-10-CM

## 2025-07-10 DIAGNOSIS — F41.1 GENERALIZED ANXIETY DISORDER: ICD-10-CM

## 2025-07-10 DIAGNOSIS — Z30.8 ENCOUNTER FOR OTHER CONTRACEPTIVE MANAGEMENT: ICD-10-CM

## 2025-07-10 PROBLEM — O09.899 RUBELLA NON-IMMUNE STATUS, ANTEPARTUM: Status: RESOLVED | Noted: 2024-10-24 | Resolved: 2025-07-10

## 2025-07-10 PROBLEM — O09.90 SUPERVISION OF HIGH RISK PREGNANCY, ANTEPARTUM: Status: RESOLVED | Noted: 2024-10-23 | Resolved: 2025-07-10

## 2025-07-10 PROBLEM — K64.9 HEMORRHOIDS, UNSPECIFIED HEMORRHOID TYPE: Status: RESOLVED | Noted: 2025-01-26 | Resolved: 2025-07-10

## 2025-07-10 PROBLEM — E66.811 CLASS 1 OBESITY DUE TO EXCESS CALORIES WITHOUT SERIOUS COMORBIDITY WITH BODY MASS INDEX (BMI) OF 34.0 TO 34.9 IN ADULT: Status: RESOLVED | Noted: 2025-05-22 | Resolved: 2025-07-10

## 2025-07-10 PROBLEM — O36.8390 FETAL HEART RATE DECELERATIONS AFFECTING MANAGEMENT OF MOTHER: Status: RESOLVED | Noted: 2025-05-29 | Resolved: 2025-07-10

## 2025-07-10 PROBLEM — E66.09 CLASS 1 OBESITY DUE TO EXCESS CALORIES WITHOUT SERIOUS COMORBIDITY WITH BODY MASS INDEX (BMI) OF 34.0 TO 34.9 IN ADULT: Status: RESOLVED | Noted: 2025-05-22 | Resolved: 2025-07-10

## 2025-07-10 PROBLEM — Z28.39 RUBELLA NON-IMMUNE STATUS, ANTEPARTUM: Status: RESOLVED | Noted: 2024-10-24 | Resolved: 2025-07-10

## 2025-07-10 PROBLEM — Z37.9 NORMAL LABOR: Status: RESOLVED | Noted: 2025-05-29 | Resolved: 2025-07-10

## 2025-07-10 PROBLEM — O26.03 EXCESSIVE WEIGHT GAIN IN PREGNANCY IN THIRD TRIMESTER: Status: RESOLVED | Noted: 2025-05-22 | Resolved: 2025-07-10

## 2025-07-10 LAB
HGB BLD-MCNC: 11 G/DL (ref 11.7–15.7)
MCV RBC AUTO: 77 FL (ref 78–100)

## 2025-07-10 ASSESSMENT — ANXIETY QUESTIONNAIRES
GAD7 TOTAL SCORE: 3
GAD7 TOTAL SCORE: 3
7. FEELING AFRAID AS IF SOMETHING AWFUL MIGHT HAPPEN: NOT AT ALL
8. IF YOU CHECKED OFF ANY PROBLEMS, HOW DIFFICULT HAVE THESE MADE IT FOR YOU TO DO YOUR WORK, TAKE CARE OF THINGS AT HOME, OR GET ALONG WITH OTHER PEOPLE?: NOT DIFFICULT AT ALL
6. BECOMING EASILY ANNOYED OR IRRITABLE: MORE THAN HALF THE DAYS
3. WORRYING TOO MUCH ABOUT DIFFERENT THINGS: NOT AT ALL
IF YOU CHECKED OFF ANY PROBLEMS ON THIS QUESTIONNAIRE, HOW DIFFICULT HAVE THESE PROBLEMS MADE IT FOR YOU TO DO YOUR WORK, TAKE CARE OF THINGS AT HOME, OR GET ALONG WITH OTHER PEOPLE: NOT DIFFICULT AT ALL
4. TROUBLE RELAXING: SEVERAL DAYS
GAD7 TOTAL SCORE: 3
7. FEELING AFRAID AS IF SOMETHING AWFUL MIGHT HAPPEN: NOT AT ALL
1. FEELING NERVOUS, ANXIOUS, OR ON EDGE: NOT AT ALL
2. NOT BEING ABLE TO STOP OR CONTROL WORRYING: NOT AT ALL
5. BEING SO RESTLESS THAT IT IS HARD TO SIT STILL: NOT AT ALL

## 2025-07-10 ASSESSMENT — PATIENT HEALTH QUESTIONNAIRE - PHQ9
10. IF YOU CHECKED OFF ANY PROBLEMS, HOW DIFFICULT HAVE THESE PROBLEMS MADE IT FOR YOU TO DO YOUR WORK, TAKE CARE OF THINGS AT HOME, OR GET ALONG WITH OTHER PEOPLE: NOT DIFFICULT AT ALL
SUM OF ALL RESPONSES TO PHQ QUESTIONS 1-9: 5
SUM OF ALL RESPONSES TO PHQ QUESTIONS 1-9: 5

## 2025-07-10 ASSESSMENT — EDINBURGH POSTNATAL DEPRESSION SCALE (EPDS)
I HAVE BEEN SO UNHAPPY THAT I HAVE BEEN CRYING: ONLY OCCASIONALLY
TOTAL SCORE: 7
I HAVE BEEN ANXIOUS OR WORRIED FOR NO GOOD REASON: YES, SOMETIMES
THINGS HAVE BEEN GETTING ON TOP OF ME: NO, MOST OF THE TIME I HAVE COPED QUITE WELL
I HAVE BEEN SO UNHAPPY THAT I HAVE HAD DIFFICULTY SLEEPING: NOT AT ALL
THE THOUGHT OF HARMING MYSELF HAS OCCURRED TO ME: NEVER
I HAVE BEEN ABLE TO LAUGH AND SEE THE FUNNY SIDE OF THINGS: AS MUCH AS I ALWAYS COULD
I HAVE BLAMED MYSELF UNNECESSARILY WHEN THINGS WENT WRONG: NOT VERY OFTEN
I HAVE FELT SCARED OR PANICKY FOR NO GOOD REASON: NO, NOT MUCH
I HAVE LOOKED FORWARD WITH ENJOYMENT TO THINGS: AS MUCH AS I EVER DID
I HAVE FELT SAD OR MISERABLE: NOT VERY OFTEN

## 2025-07-10 NOTE — PROGRESS NOTES
6-week Postpartum Visit:     Assessment:   1.  6 weeks postpartum, status post primary  birth  2.  Formula feeding  3.  Contraceptive management-condoms  4.  History of anxiety and depression, stable  5.  Postpartum anemia    Plan:   - Reviewed warning signs of postpartum depression. [place]  Mental Health Resource List given for future reference prn.   -PP exercises reviewed and encouraged modified abdominal crunches and Kegels daily. Encouraged integrating formal exercise, such as walking 20-30mns daily.   -Warning signs of breast infections of mastitis and thrush reviewed. Breastfeeding support resource list and contact info given, including [facility] and [facility].   -Encouraged to continue with PNV, Vitamin D and DHA supplements.   - Return of fertility discussed. Plans condoms for contraception.   -Reviewed warning signs of pelvic pain, excessive bleeding or abdominal pain, fever/chills, or signs of breast infection.   -Labs today: Hgb  -RTC for routine health maintenance or sooner as needed.     40 minutes on the date of the encounter doing chart review, review of test results, interpretation of tests, patient visit, and documentation      Subjective:    Adrien is a 35 yo, here for her 6 week postpartum exam. She is integrating birth experience/care and transition well. Bleeding and uterine cramping have ceased. Denies pain. Reports normal bowel and bladder functioning. EPDS score 7/30, 0 to #10. Reports good family/friend support.  No longer breastfeeding/pumping since 2 weeks ago, formula only. Feeding q 2 hours. 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   Has not resumed intercourse. Denies pain or concerns. Plans to use condoms for contraception.     Feels a lot more fatigued, requesting Hgb check today. Is not taking any oral iron supplementation.    Still experiencing back pain that went throughout entire pregnancy. Had been seeing chiropractor  throughout enitre pregnancy with good results. Plans to schedule a visit chiropractor soon.     Review of Systems  Pertinent items are noted in HPI.      Objective:     Physical Exam:  General: Pleasant, articulate, well-groomed, well-nourished female.  Not in any apparent distress.  Neck: Supple.  Thyroid: Small, symmetrical, no nodules noted.  Lungs: Clear to auscultation bilaterally, and a nonlabored breathing pattern.  Cardio: Regular rate and rhythm, negative for murmur.   Breasts: Symmetrical, negative nipple discharge.  Nipples everted with no cracking or erythema.   Abdomen: Soft, nontender. Diastasis rectus 1 fingerbreadth.   incision well-healed without edema, erythema or exudate.   Lower extremities: +1 reflexes, no significant edema.    Answers submitted by the patient for this visit:  Patient Health Questionnaire (Submitted on 7/10/2025)  If you checked off any problems, how difficult have these problems made it for you to do your work, take care of things at home, or get along with other people?: Not difficult at all  PHQ9 TOTAL SCORE: 5  Patient Health Questionnaire (G7) (Submitted on 7/10/2025)  JAVIER 7 TOTAL SCORE: 3    I was present with the student Delfina Juarez who participated in the service and the documentation of the note.  I have verified the history, and personally performed the physical exam and the medical decision making.  I agree with the plan as documented by the student and as edited by me.      CROW Gupta, MICHELLE

## 2025-08-25 ENCOUNTER — PATIENT OUTREACH (OUTPATIENT)
Dept: CARE COORDINATION | Facility: CLINIC | Age: 35
End: 2025-08-25
Payer: COMMERCIAL

## (undated) DEVICE — SPONGE RAY-TEC 4X8" 7318

## (undated) DEVICE — ESU PENCIL SMOKE EVAC W/ROCKER SWITCH 0703-047-000

## (undated) DEVICE — SOL WATER IRRIG 1000ML BOTTLE 2F7114

## (undated) DEVICE — SUTURE VICRYL+ 0 36IN CT-1 UND VCP946H

## (undated) DEVICE — SU STRATAFIX PDS PLUS 0 CT-1 18" SXPP1A401

## (undated) DEVICE — GLOVE BIOGEL PI ULTRATOUCH G SZ 6.5 42165

## (undated) DEVICE — ESU GROUND PAD ADULT REM W/15' CORD E7507DB

## (undated) DEVICE — SUTURE MONOCRYL+ 4-0 PS-2 27IN MCP426H

## (undated) DEVICE — SOL NACL 0.9% IRRIG 1000ML BOTTLE 2F7124

## (undated) DEVICE — DRSG PRIMAPORE 04X11 3/4"

## (undated) DEVICE — ADH LIQUID MASTISOL TOPICAL VIAL 2-3ML 0523-48

## (undated) DEVICE — PACK MINOR SINGLE BASIN SSK3001

## (undated) DEVICE — DRSG STERI STRIP 1/2X4" R1547

## (undated) DEVICE — PAD CHUX UNDERPAD 23X36" 676105

## (undated) DEVICE — CUSTOM PACK C-SECTION LHE

## (undated) DEVICE — SYR 20ML LL W/O NDL 302830

## (undated) DEVICE — SUCTION MANIFOLD NEPTUNE 2 SYS 1 PORT 702-025-000

## (undated) DEVICE — DRSG AQUACEL AG HYDROFIBER  3.5X10" 422605

## (undated) DEVICE — SU PDS II 0 CTX 60" Z990G

## (undated) DEVICE — SUCTION TIP YANKAUER W/O VENT K86

## (undated) DEVICE — BAG BIOHAZARD RED SMALL 24X23" A4823PR

## (undated) DEVICE — SUTURE MONOCRYL+ 0 CT-1 UND 18 MCP946H

## (undated) DEVICE — PREP CHLORAPREP 26ML TINTED HI-LITE ORANGE 930815

## (undated) RX ORDER — MORPHINE SULFATE 1 MG/ML
INJECTION, SOLUTION EPIDURAL; INTRATHECAL; INTRAVENOUS
Status: DISPENSED
Start: 2025-05-29

## (undated) RX ORDER — PHENYLEPHRINE HYDROCHLORIDE 10 MG/ML
INJECTION INTRAVENOUS
Status: DISPENSED
Start: 2025-05-29

## (undated) RX ORDER — DEXAMETHASONE SODIUM PHOSPHATE 10 MG/ML
INJECTION, SOLUTION INTRAMUSCULAR; INTRAVENOUS
Status: DISPENSED
Start: 2025-05-29

## (undated) RX ORDER — FENTANYL CITRATE 50 UG/ML
INJECTION, SOLUTION INTRAMUSCULAR; INTRAVENOUS
Status: DISPENSED
Start: 2025-05-29

## (undated) RX ORDER — KETOROLAC TROMETHAMINE 30 MG/ML
INJECTION, SOLUTION INTRAMUSCULAR; INTRAVENOUS
Status: DISPENSED
Start: 2025-05-30

## (undated) RX ORDER — ONDANSETRON 2 MG/ML
INJECTION INTRAMUSCULAR; INTRAVENOUS
Status: DISPENSED
Start: 2025-05-29